# Patient Record
Sex: MALE | Race: WHITE | NOT HISPANIC OR LATINO | Employment: FULL TIME | URBAN - METROPOLITAN AREA
[De-identification: names, ages, dates, MRNs, and addresses within clinical notes are randomized per-mention and may not be internally consistent; named-entity substitution may affect disease eponyms.]

---

## 2019-07-16 ENCOUNTER — OFFICE VISIT - HEALTHEAST (OUTPATIENT)
Dept: FAMILY MEDICINE | Facility: CLINIC | Age: 34
End: 2019-07-16

## 2019-07-16 DIAGNOSIS — F41.1 GENERALIZED ANXIETY DISORDER WITH PANIC ATTACKS: ICD-10-CM

## 2019-07-16 DIAGNOSIS — R63.4 LOSS OF WEIGHT: ICD-10-CM

## 2019-07-16 DIAGNOSIS — F32.1 CURRENT MODERATE EPISODE OF MAJOR DEPRESSIVE DISORDER, UNSPECIFIED WHETHER RECURRENT (H): ICD-10-CM

## 2019-07-16 DIAGNOSIS — Z00.01 ENCOUNTER FOR ROUTINE ADULT HEALTH EXAMINATION WITH ABNORMAL FINDINGS: ICD-10-CM

## 2019-07-16 DIAGNOSIS — D50.9 MICROCYTIC ANEMIA: ICD-10-CM

## 2019-07-16 DIAGNOSIS — J30.2 SEASONAL ALLERGIC RHINITIS, UNSPECIFIED TRIGGER: ICD-10-CM

## 2019-07-16 DIAGNOSIS — G43.009 MIGRAINE WITHOUT AURA AND WITHOUT STATUS MIGRAINOSUS, NOT INTRACTABLE: ICD-10-CM

## 2019-07-16 DIAGNOSIS — F41.0 GENERALIZED ANXIETY DISORDER WITH PANIC ATTACKS: ICD-10-CM

## 2019-07-16 DIAGNOSIS — F11.90 METHADONE USE: ICD-10-CM

## 2019-07-16 DIAGNOSIS — Z13.1 ENCOUNTER FOR SCREENING FOR DIABETES MELLITUS: ICD-10-CM

## 2019-07-16 DIAGNOSIS — Z13.220 ENCOUNTER FOR SCREENING FOR LIPOID DISORDERS: ICD-10-CM

## 2019-07-16 DIAGNOSIS — Z23 IMMUNIZATION DUE: ICD-10-CM

## 2019-07-16 DIAGNOSIS — K29.50 CHRONIC GASTRITIS WITHOUT BLEEDING, UNSPECIFIED GASTRITIS TYPE: ICD-10-CM

## 2019-07-16 DIAGNOSIS — R61 NIGHT SWEATS: ICD-10-CM

## 2019-07-16 LAB
ALBUMIN SERPL-MCNC: 4.3 G/DL (ref 3.5–5)
ALP SERPL-CCNC: 92 U/L (ref 45–120)
ALT SERPL W P-5'-P-CCNC: 15 U/L (ref 0–45)
ANION GAP SERPL CALCULATED.3IONS-SCNC: 9 MMOL/L (ref 5–18)
AST SERPL W P-5'-P-CCNC: 21 U/L (ref 0–40)
ATRIAL RATE - MUSE: 60 BPM
BASOPHILS # BLD AUTO: 0 THOU/UL (ref 0–0.2)
BASOPHILS NFR BLD AUTO: 0 % (ref 0–2)
BILIRUB SERPL-MCNC: 0.2 MG/DL (ref 0–1)
BUN SERPL-MCNC: 17 MG/DL (ref 8–22)
CALCIUM SERPL-MCNC: 10.2 MG/DL (ref 8.5–10.5)
CHLORIDE BLD-SCNC: 104 MMOL/L (ref 98–107)
CHOLEST SERPL-MCNC: 162 MG/DL
CO2 SERPL-SCNC: 27 MMOL/L (ref 22–31)
CREAT SERPL-MCNC: 0.91 MG/DL (ref 0.7–1.3)
DIASTOLIC BLOOD PRESSURE - MUSE: NORMAL MMHG
EOSINOPHIL # BLD AUTO: 0.1 THOU/UL (ref 0–0.4)
EOSINOPHIL NFR BLD AUTO: 3 % (ref 0–6)
ERYTHROCYTE [DISTWIDTH] IN BLOOD BY AUTOMATED COUNT: 14.6 % (ref 11–14.5)
FASTING STATUS PATIENT QL REPORTED: YES
GFR SERPL CREATININE-BSD FRML MDRD: >60 ML/MIN/1.73M2
GLUCOSE BLD-MCNC: 86 MG/DL (ref 70–125)
HCT VFR BLD AUTO: 34.7 % (ref 40–54)
HDLC SERPL-MCNC: 40 MG/DL
HGB BLD-MCNC: 11.5 G/DL (ref 14–18)
INTERPRETATION ECG - MUSE: NORMAL
LDLC SERPL CALC-MCNC: 93 MG/DL
LIPASE SERPL-CCNC: 23 U/L (ref 0–52)
LYMPHOCYTES # BLD AUTO: 1.2 THOU/UL (ref 0.8–4.4)
LYMPHOCYTES NFR BLD AUTO: 29 % (ref 20–40)
MCH RBC QN AUTO: 24.9 PG (ref 27–34)
MCHC RBC AUTO-ENTMCNC: 33.2 G/DL (ref 32–36)
MCV RBC AUTO: 75 FL (ref 80–100)
MONOCYTES # BLD AUTO: 0.2 THOU/UL (ref 0–0.9)
MONOCYTES NFR BLD AUTO: 5 % (ref 2–10)
NEUTROPHILS # BLD AUTO: 2.6 THOU/UL (ref 2–7.7)
NEUTROPHILS NFR BLD AUTO: 62 % (ref 50–70)
P AXIS - MUSE: 74 DEGREES
PLATELET # BLD AUTO: 155 THOU/UL (ref 140–440)
PMV BLD AUTO: 9.9 FL (ref 7–10)
POTASSIUM BLD-SCNC: 4.8 MMOL/L (ref 3.5–5)
PR INTERVAL - MUSE: 134 MS
PROT SERPL-MCNC: 7.2 G/DL (ref 6–8)
QRS DURATION - MUSE: 100 MS
QT - MUSE: 438 MS
QTC - MUSE: 438 MS
R AXIS - MUSE: 67 DEGREES
RBC # BLD AUTO: 4.63 MILL/UL (ref 4.4–6.2)
SODIUM SERPL-SCNC: 140 MMOL/L (ref 136–145)
SYSTOLIC BLOOD PRESSURE - MUSE: NORMAL MMHG
T AXIS - MUSE: 69 DEGREES
TRIGL SERPL-MCNC: 147 MG/DL
TSH SERPL DL<=0.005 MIU/L-ACNC: 0.75 UIU/ML (ref 0.3–5)
VENTRICULAR RATE- MUSE: 60 BPM
WBC: 4.2 THOU/UL (ref 4–11)

## 2019-07-16 ASSESSMENT — MIFFLIN-ST. JEOR: SCORE: 1624.3

## 2019-07-17 ENCOUNTER — COMMUNICATION - HEALTHEAST (OUTPATIENT)
Dept: LAB | Facility: CLINIC | Age: 34
End: 2019-07-17

## 2019-07-17 DIAGNOSIS — F11.90 METHADONE USE: ICD-10-CM

## 2019-07-17 LAB — TESTOST SERPL-MCNC: 730 NG/DL (ref 240–871)

## 2019-07-18 ENCOUNTER — COMMUNICATION - HEALTHEAST (OUTPATIENT)
Dept: FAMILY MEDICINE | Facility: CLINIC | Age: 34
End: 2019-07-18

## 2019-07-19 ENCOUNTER — COMMUNICATION - HEALTHEAST (OUTPATIENT)
Dept: FAMILY MEDICINE | Facility: CLINIC | Age: 34
End: 2019-07-19

## 2019-07-19 LAB
GAMMA INTERFERON BACKGROUND BLD IA-ACNC: 0.03 IU/ML
M TB IFN-G BLD-IMP: NEGATIVE
MITOGEN IGNF BCKGRD COR BLD-ACNC: 0 IU/ML
MITOGEN IGNF BCKGRD COR BLD-ACNC: 0.02 IU/ML
QTF INTERPRETATION: NORMAL
QTF MITOGEN - NIL: >10 IU/ML

## 2019-07-23 ENCOUNTER — COMMUNICATION - HEALTHEAST (OUTPATIENT)
Dept: FAMILY MEDICINE | Facility: CLINIC | Age: 34
End: 2019-07-23

## 2019-07-23 ENCOUNTER — AMBULATORY - HEALTHEAST (OUTPATIENT)
Dept: LAB | Facility: CLINIC | Age: 34
End: 2019-07-23

## 2019-07-23 DIAGNOSIS — K29.50 CHRONIC GASTRITIS WITHOUT BLEEDING, UNSPECIFIED GASTRITIS TYPE: ICD-10-CM

## 2019-07-25 LAB
H PYLORI AG STL QL IA: NORMAL
REPORT STATUS: NORMAL
SPECIMEN DESCRIPTION: NORMAL

## 2019-07-26 ENCOUNTER — COMMUNICATION - HEALTHEAST (OUTPATIENT)
Dept: FAMILY MEDICINE | Facility: CLINIC | Age: 34
End: 2019-07-26

## 2019-08-20 ENCOUNTER — OFFICE VISIT - HEALTHEAST (OUTPATIENT)
Dept: FAMILY MEDICINE | Facility: CLINIC | Age: 34
End: 2019-08-20

## 2019-08-20 DIAGNOSIS — F41.1 GENERALIZED ANXIETY DISORDER WITH PANIC ATTACKS: ICD-10-CM

## 2019-08-20 DIAGNOSIS — F32.1 CURRENT MODERATE EPISODE OF MAJOR DEPRESSIVE DISORDER, UNSPECIFIED WHETHER RECURRENT (H): ICD-10-CM

## 2019-08-20 DIAGNOSIS — F41.0 GENERALIZED ANXIETY DISORDER WITH PANIC ATTACKS: ICD-10-CM

## 2019-08-20 ASSESSMENT — MIFFLIN-ST. JEOR: SCORE: 1642.16

## 2019-10-01 ENCOUNTER — RECORDS - HEALTHEAST (OUTPATIENT)
Dept: ADMINISTRATIVE | Facility: OTHER | Age: 34
End: 2019-10-01

## 2019-10-02 ENCOUNTER — COMMUNICATION - HEALTHEAST (OUTPATIENT)
Dept: FAMILY MEDICINE | Facility: CLINIC | Age: 34
End: 2019-10-02

## 2019-11-04 ENCOUNTER — OFFICE VISIT - HEALTHEAST (OUTPATIENT)
Dept: FAMILY MEDICINE | Facility: CLINIC | Age: 34
End: 2019-11-04

## 2019-11-04 DIAGNOSIS — R05.9 COUGH: ICD-10-CM

## 2019-11-04 DIAGNOSIS — J02.9 SORE THROAT: ICD-10-CM

## 2019-11-04 LAB — DEPRECATED S PYO AG THROAT QL EIA: NORMAL

## 2019-11-04 ASSESSMENT — MIFFLIN-ST. JEOR: SCORE: 1672.77

## 2019-11-05 LAB — GROUP A STREP BY PCR: NORMAL

## 2019-11-08 ENCOUNTER — COMMUNICATION - HEALTHEAST (OUTPATIENT)
Dept: FAMILY MEDICINE | Facility: CLINIC | Age: 34
End: 2019-11-08

## 2019-11-08 DIAGNOSIS — F32.1 CURRENT MODERATE EPISODE OF MAJOR DEPRESSIVE DISORDER, UNSPECIFIED WHETHER RECURRENT (H): ICD-10-CM

## 2019-11-08 DIAGNOSIS — F41.1 GENERALIZED ANXIETY DISORDER WITH PANIC ATTACKS: ICD-10-CM

## 2019-11-08 DIAGNOSIS — F41.0 GENERALIZED ANXIETY DISORDER WITH PANIC ATTACKS: ICD-10-CM

## 2020-01-31 ENCOUNTER — RECORDS - HEALTHEAST (OUTPATIENT)
Dept: ADMINISTRATIVE | Facility: OTHER | Age: 35
End: 2020-01-31

## 2020-03-25 ENCOUNTER — COMMUNICATION - HEALTHEAST (OUTPATIENT)
Dept: SCHEDULING | Facility: CLINIC | Age: 35
End: 2020-03-25

## 2020-03-26 ENCOUNTER — OFFICE VISIT - HEALTHEAST (OUTPATIENT)
Dept: FAMILY MEDICINE | Facility: CLINIC | Age: 35
End: 2020-03-26

## 2020-05-20 ENCOUNTER — HOSPITAL ENCOUNTER (EMERGENCY)
Facility: CLINIC | Age: 35
Discharge: HOME | End: 2020-05-20
Attending: FAMILY MEDICINE | Admitting: FAMILY MEDICINE
Payer: COMMERCIAL

## 2020-05-20 ENCOUNTER — RECORDS - HEALTHEAST (OUTPATIENT)
Dept: ADMINISTRATIVE | Facility: OTHER | Age: 35
End: 2020-05-20

## 2020-05-20 ENCOUNTER — COMMUNICATION - HEALTHEAST (OUTPATIENT)
Dept: SCHEDULING | Facility: CLINIC | Age: 35
End: 2020-05-20

## 2020-05-20 VITALS
DIASTOLIC BLOOD PRESSURE: 90 MMHG | SYSTOLIC BLOOD PRESSURE: 135 MMHG | WEIGHT: 160 LBS | HEART RATE: 77 BPM | TEMPERATURE: 98.9 F | OXYGEN SATURATION: 98 %

## 2020-05-20 DIAGNOSIS — R10.13 EPIGASTRIC PAIN: ICD-10-CM

## 2020-05-20 DIAGNOSIS — K21.00 GASTROESOPHAGEAL REFLUX DISEASE WITH ESOPHAGITIS: ICD-10-CM

## 2020-05-20 LAB
ALBUMIN SERPL-MCNC: 3.9 G/DL (ref 3.4–5)
ALP SERPL-CCNC: 117 U/L (ref 40–150)
ALT SERPL W P-5'-P-CCNC: 30 U/L (ref 0–70)
ANION GAP SERPL CALCULATED.3IONS-SCNC: 8 MMOL/L (ref 3–14)
AST SERPL W P-5'-P-CCNC: 19 U/L (ref 0–45)
BASOPHILS # BLD AUTO: 0.1 10E9/L (ref 0–0.2)
BASOPHILS NFR BLD AUTO: 0.6 %
BILIRUB SERPL-MCNC: 0.2 MG/DL (ref 0.2–1.3)
BUN SERPL-MCNC: 12 MG/DL (ref 7–30)
CALCIUM SERPL-MCNC: 9.3 MG/DL (ref 8.5–10.1)
CHLORIDE SERPL-SCNC: 106 MMOL/L (ref 94–109)
CO2 SERPL-SCNC: 27 MMOL/L (ref 20–32)
CREAT SERPL-MCNC: 0.79 MG/DL (ref 0.66–1.25)
DIFFERENTIAL METHOD BLD: NORMAL
EOSINOPHIL # BLD AUTO: 0.1 10E9/L (ref 0–0.7)
EOSINOPHIL NFR BLD AUTO: 1.1 %
ERYTHROCYTE [DISTWIDTH] IN BLOOD BY AUTOMATED COUNT: 14.1 % (ref 10–15)
GFR SERPL CREATININE-BSD FRML MDRD: >90 ML/MIN/{1.73_M2}
GLUCOSE SERPL-MCNC: 107 MG/DL (ref 70–99)
HCT VFR BLD AUTO: 40.1 % (ref 40–53)
HGB BLD-MCNC: 13.6 G/DL (ref 13.3–17.7)
IMM GRANULOCYTES # BLD: 0 10E9/L (ref 0–0.4)
IMM GRANULOCYTES NFR BLD: 0.2 %
LIPASE SERPL-CCNC: 80 U/L (ref 73–393)
LYMPHOCYTES # BLD AUTO: 2.1 10E9/L (ref 0.8–5.3)
LYMPHOCYTES NFR BLD AUTO: 25.3 %
MCH RBC QN AUTO: 28.5 PG (ref 26.5–33)
MCHC RBC AUTO-ENTMCNC: 33.9 G/DL (ref 31.5–36.5)
MCV RBC AUTO: 84 FL (ref 78–100)
MONOCYTES # BLD AUTO: 0.8 10E9/L (ref 0–1.3)
MONOCYTES NFR BLD AUTO: 9 %
NEUTROPHILS # BLD AUTO: 5.3 10E9/L (ref 1.6–8.3)
NEUTROPHILS NFR BLD AUTO: 63.8 %
NRBC # BLD AUTO: 0 10*3/UL
NRBC BLD AUTO-RTO: 0 /100
PLATELET # BLD AUTO: 227 10E9/L (ref 150–450)
POTASSIUM SERPL-SCNC: 3.6 MMOL/L (ref 3.4–5.3)
PROT SERPL-MCNC: 7.5 G/DL (ref 6.8–8.8)
RBC # BLD AUTO: 4.77 10E12/L (ref 4.4–5.9)
SODIUM SERPL-SCNC: 141 MMOL/L (ref 133–144)
WBC # BLD AUTO: 8.4 10E9/L (ref 4–11)

## 2020-05-20 PROCEDURE — 96361 HYDRATE IV INFUSION ADD-ON: CPT

## 2020-05-20 PROCEDURE — 99284 EMERGENCY DEPT VISIT MOD MDM: CPT | Mod: 25

## 2020-05-20 PROCEDURE — 99284 EMERGENCY DEPT VISIT MOD MDM: CPT | Mod: Z6 | Performed by: FAMILY MEDICINE

## 2020-05-20 PROCEDURE — 25000128 H RX IP 250 OP 636: Performed by: FAMILY MEDICINE

## 2020-05-20 PROCEDURE — 25800030 ZZH RX IP 258 OP 636: Performed by: FAMILY MEDICINE

## 2020-05-20 PROCEDURE — 85025 COMPLETE CBC W/AUTO DIFF WBC: CPT | Performed by: FAMILY MEDICINE

## 2020-05-20 PROCEDURE — 96375 TX/PRO/DX INJ NEW DRUG ADDON: CPT

## 2020-05-20 PROCEDURE — 80053 COMPREHEN METABOLIC PANEL: CPT | Performed by: FAMILY MEDICINE

## 2020-05-20 PROCEDURE — 96374 THER/PROPH/DIAG INJ IV PUSH: CPT

## 2020-05-20 PROCEDURE — 83690 ASSAY OF LIPASE: CPT | Performed by: FAMILY MEDICINE

## 2020-05-20 RX ORDER — SUMATRIPTAN 50 MG/1
50 TABLET, FILM COATED ORAL
COMMUNITY
Start: 2019-07-16 | End: 2020-07-01

## 2020-05-20 RX ORDER — METOCLOPRAMIDE HYDROCHLORIDE 5 MG/ML
5 INJECTION INTRAMUSCULAR; INTRAVENOUS ONCE
Status: COMPLETED | OUTPATIENT
Start: 2020-05-20 | End: 2020-05-20

## 2020-05-20 RX ORDER — LORATADINE 10 MG/1
10 TABLET ORAL
COMMUNITY
End: 2020-07-01

## 2020-05-20 RX ORDER — ESCITALOPRAM OXALATE 20 MG/1
TABLET ORAL
COMMUNITY
Start: 2019-11-08 | End: 2020-07-01

## 2020-05-20 RX ORDER — SUCRALFATE ORAL 1 G/10ML
1-2 SUSPENSION ORAL 4 TIMES DAILY PRN
Qty: 420 ML | Refills: 0 | Status: SHIPPED | OUTPATIENT
Start: 2020-05-20 | End: 2020-07-01

## 2020-05-20 RX ORDER — METHADONE HYDROCHLORIDE 10 MG/ML
170 CONCENTRATE ORAL DAILY
COMMUNITY

## 2020-05-20 RX ORDER — DIPHENHYDRAMINE HYDROCHLORIDE 50 MG/ML
12.5 INJECTION INTRAMUSCULAR; INTRAVENOUS ONCE
Status: COMPLETED | OUTPATIENT
Start: 2020-05-20 | End: 2020-05-20

## 2020-05-20 RX ORDER — ONDANSETRON 2 MG/ML
4 INJECTION INTRAMUSCULAR; INTRAVENOUS EVERY 30 MIN PRN
Status: DISCONTINUED | OUTPATIENT
Start: 2020-05-20 | End: 2020-05-21 | Stop reason: HOSPADM

## 2020-05-20 RX ADMIN — METOCLOPRAMIDE HYDROCHLORIDE 5 MG: 5 INJECTION INTRAMUSCULAR; INTRAVENOUS at 21:56

## 2020-05-20 RX ADMIN — SODIUM CHLORIDE 1000 ML: 9 INJECTION, SOLUTION INTRAVENOUS at 20:59

## 2020-05-20 RX ADMIN — DIPHENHYDRAMINE HYDROCHLORIDE 12.5 MG: 50 INJECTION, SOLUTION INTRAMUSCULAR; INTRAVENOUS at 21:55

## 2020-05-20 RX ADMIN — ONDANSETRON 8 MG: 2 INJECTION INTRAMUSCULAR; INTRAVENOUS at 20:57

## 2020-05-20 NOTE — ED AVS SNAPSHOT
[facility] Emergency Department  [address] [address]  Phone:  [phone]  Fax:  [phone]                                    Matt Ramirez   MRN: [id number]    Department:  [facility] Emergency Department   Date of Visit:  5/20/2020           After Visit Summary Signature Page    I have received my discharge instructions, and my questions have been answered. I have discussed any challenges I see with this plan with the nurse or doctor.    ..........................................................................................................................................  Patient/Patient Representative Signature      ..........................................................................................................................................  Patient Representative Print Name and Relationship to Patient    ..................................................               ................................................  Date                                   Time    ..........................................................................................................................................  Reviewed by Signature/Title    ...................................................              ..............................................  Date                                               Time          22EPIC Rev 08/18

## 2020-05-21 NOTE — ED NOTES
Pt presents to ED with complaints of abd pain from known ulcers.  Pt was advised to be seen in ED d/t pain, lack of eating, and continued nausea/vomiting.  Pt arrives via EMS.  Pt A&Ox4.  States that he was to have surgery until COVID pandemic caused surgeries to be put on hold.

## 2020-05-21 NOTE — DISCHARGE INSTRUCTIONS
Return to the Emergency Room if the following occurs:     Severely worsened pain, dehydration, fever >101, or for any concern at anytime.    Or, follow-up with the following provider as we discussed:     Return to your primary doctor or GI specialist or surgeon as able/scheduled.    Medications discussed:    Consider Carafate added to your current regimen.    If you received pain-relieving or sedating medication during your time in the ER, avoid alcohol, driving automobiles, or working with machinery.  Also, a responsible adult must stay with you.        Call the Nurse Advice Line at [phone] or [phone] for any concern at anytime.

## 2020-05-21 NOTE — ED NOTES
"Pt appears distressed, states \"I just wish I could have a pain shot and a sleeping pill, and I could sleep through this.\" pt updated on meds ordered by MD, which he is agreeable to trial.   "

## 2020-05-21 NOTE — ED PROVIDER NOTES
"  HPI   The patient is a 34-year-old male presenting with epigastric pain \"related to my ulcers.\"  He has a known history of peptic ulcer disease.  He is currently taking high doses of Prilosec and Pepcid.  His last upper endoscopy was about 4 months ago.  He reports his primary care clinic and GI physician in [place], [place].  He denies having been in the hospital environment previously.  He does report having chronic low back pain and using ibuprofen on a regular basis.  He denies having H. pylori.  He smokes.  He does not chew tobacco.  He does not drink alcohol.    His pain is felt in the epigastrium and left upper quadrant.  The pain is constant.  He has nausea and retching especially over the past 2 days.  No hematemesis reported.  He denies diarrhea or constipation.  No hematochezia or melena.  Reports his pain is severe.  No radiating symptoms described.  No chest pain or shortness of breath.  No flank pain.  No urinary symptoms.  No recent trauma or injury.  His pain is very similar to previous just more severe.        Allergies:  No Known Allergies  Problem List:    There are no active problems to display for this patient.     Past Medical History:    History reviewed. No pertinent past medical history.  Past Surgical History:    History reviewed. No pertinent surgical history.  Family History:    No family history on file.  Social History:  Marital Status:  Single [1]  Social History     Tobacco Use     Smoking status: None   Substance Use Topics     Alcohol use: None     Drug use: None      Medications:    escitalopram (LEXAPRO) 20 MG tablet  sucralfate (CARAFATE) 1 GM/10ML suspension  SUMAtriptan (IMITREX) 50 MG tablet  loratadine (CLARITIN) 10 MG tablet  methadone (DOLOPHINE-INTENSOL) 10 MG/ML (HIGH CONC) solution      Review of Systems   All other systems reviewed and are negative.      PE   BP: (!) 133/91  Pulse: 81  Heart Rate: 91  Temp: 98.9  F (37.2  C)  Weight: 72.6 kg (160 lb)  SpO2: 99 " %  Physical Exam  Vitals signs and nursing note reviewed.   Constitutional:       General: He is in acute distress.      Appearance: He is not diaphoretic.      Comments: Curled into a fetal position on the R side.  Cooperative and answering questions well.  Grimacing.   HENT:      Head: Atraumatic.   Eyes:      General: No scleral icterus.     Pupils: Pupils are equal, round, and reactive to light.   Neck:      Musculoskeletal: Normal range of motion.   Cardiovascular:      Heart sounds: Normal heart sounds.   Pulmonary:      Effort: No respiratory distress.      Breath sounds: Normal breath sounds.   Abdominal:      General: Bowel sounds are normal.      Palpations: Abdomen is soft.      Comments: Tender in the epigastrium and left upper quadrant.  Soft throughout.  No distention.   Musculoskeletal: Normal range of motion.         General: No tenderness.   Skin:     General: Skin is warm.      Findings: No rash.   Neurological:      Mental Status: He is alert and oriented to person, place, and time.   Psychiatric:         Behavior: Behavior normal.         ED COURSE and [facility]   2152.  Patient presenting with left upper quadrant pain.  His pain is similar to what he has experienced previously, just more severe.  Low concern for perforated ulcer as his blood work is normal, he is without surgical abdomen, and his vital signs are within normal limits.  GI cocktail, Reglan, Benadryl ordered.  Fluid bolus.    2322.  Lab values unremarkable.  Low concern for severe underlying pathology requiring hospitalization or emergent consultation.  Medications given did not seem to help his pain much.  He is requesting narcotic medication.  I am hesitant to provide narcotic medication in the situation and I discussed this with him directly.  I would provide Carafate as needed for pain.  He agreed.  Follow-up discussed.  Return for worsening.    LABS  Labs Ordered and Resulted from Time of ED Arrival Up to the Time of Departure from  the ED   COMPREHENSIVE METABOLIC PANEL - Abnormal; Notable for the following components:       Result Value    Glucose 107 (*)     All other components within normal limits   CBC WITH PLATELETS DIFFERENTIAL   LIPASE   ALCOHOL ETHYL       IMAGING  Images reviewed by me.  Radiology report also reviewed.  No orders to display       Procedures    Medications   ondansetron (ZOFRAN) injection 4 mg (8 mg Intravenous Given 5/20/20 2057)   lidocaine (XYLOCAINE) 2 % 15 mL, alum & mag hydroxide-simethicone (MAALOX  ES) 15 mL GI Cocktail (has no administration in time range)   0.9% sodium chloride BOLUS (0 mLs Intravenous Stopped 5/20/20 2200)   metoclopramide (REGLAN) injection 5 mg (5 mg Intravenous Given 5/20/20 2156)   diphenhydrAMINE (BENADRYL) injection 12.5 mg (12.5 mg Intravenous Given 5/20/20 2155)         IMPRESSION       ICD-10-CM    1. Epigastric pain  R10.13    2. Gastroesophageal reflux disease with esophagitis  K21.0             Medication List      Started    sucralfate 1 GM/10ML suspension  Commonly known as:  Carafate  1-2 g, Oral, 4 TIMES DAILY PRN                          Mario Bennett MD  05/20/20 6840

## 2020-07-01 ENCOUNTER — APPOINTMENT (OUTPATIENT)
Dept: GENERAL RADIOLOGY | Facility: CLINIC | Age: 35
End: 2020-07-01
Attending: PHYSICIAN ASSISTANT
Payer: COMMERCIAL

## 2020-07-01 ENCOUNTER — RECORDS - HEALTHEAST (OUTPATIENT)
Dept: ADMINISTRATIVE | Facility: OTHER | Age: 35
End: 2020-07-01

## 2020-07-01 ENCOUNTER — COMMUNICATION - HEALTHEAST (OUTPATIENT)
Dept: FAMILY MEDICINE | Facility: CLINIC | Age: 35
End: 2020-07-01

## 2020-07-01 ENCOUNTER — APPOINTMENT (OUTPATIENT)
Dept: CT IMAGING | Facility: CLINIC | Age: 35
End: 2020-07-01
Attending: PHYSICIAN ASSISTANT
Payer: COMMERCIAL

## 2020-07-01 ENCOUNTER — HOSPITAL ENCOUNTER (EMERGENCY)
Facility: CLINIC | Age: 35
Discharge: HOME | End: 2020-07-01
Attending: PHYSICIAN ASSISTANT | Admitting: PHYSICIAN ASSISTANT
Payer: COMMERCIAL

## 2020-07-01 VITALS
OXYGEN SATURATION: 98 % | HEART RATE: 72 BPM | RESPIRATION RATE: 16 BRPM | SYSTOLIC BLOOD PRESSURE: 103 MMHG | DIASTOLIC BLOOD PRESSURE: 70 MMHG | HEIGHT: 72 IN | WEIGHT: 160 LBS | TEMPERATURE: 98.7 F | BODY MASS INDEX: 21.67 KG/M2

## 2020-07-01 DIAGNOSIS — R10.13 EPIGASTRIC PAIN: ICD-10-CM

## 2020-07-01 DIAGNOSIS — R19.7 NAUSEA, VOMITING, AND DIARRHEA: ICD-10-CM

## 2020-07-01 DIAGNOSIS — R50.9 FEVER: ICD-10-CM

## 2020-07-01 DIAGNOSIS — R11.2 NAUSEA, VOMITING, AND DIARRHEA: ICD-10-CM

## 2020-07-01 LAB
ALBUMIN SERPL-MCNC: 4.5 G/DL (ref 3.4–5)
ALP SERPL-CCNC: 114 U/L (ref 40–150)
ALT SERPL W P-5'-P-CCNC: 19 U/L (ref 0–70)
ANION GAP SERPL CALCULATED.3IONS-SCNC: 8 MMOL/L (ref 3–14)
AST SERPL W P-5'-P-CCNC: 13 U/L (ref 0–45)
BASOPHILS # BLD AUTO: 0 10E9/L (ref 0–0.2)
BASOPHILS NFR BLD AUTO: 0.4 %
BILIRUB SERPL-MCNC: 0.3 MG/DL (ref 0.2–1.3)
BUN SERPL-MCNC: 13 MG/DL (ref 7–30)
CALCIUM SERPL-MCNC: 9.9 MG/DL (ref 8.5–10.1)
CHLORIDE SERPL-SCNC: 102 MMOL/L (ref 94–109)
CO2 SERPL-SCNC: 28 MMOL/L (ref 20–32)
CREAT SERPL-MCNC: 0.8 MG/DL (ref 0.66–1.25)
DIFFERENTIAL METHOD BLD: NORMAL
EOSINOPHIL # BLD AUTO: 0 10E9/L (ref 0–0.7)
EOSINOPHIL NFR BLD AUTO: 0.4 %
ERYTHROCYTE [DISTWIDTH] IN BLOOD BY AUTOMATED COUNT: 13.1 % (ref 10–15)
GFR SERPL CREATININE-BSD FRML MDRD: >90 ML/MIN/{1.73_M2}
GLUCOSE SERPL-MCNC: 106 MG/DL (ref 70–99)
HCT VFR BLD AUTO: 43.8 % (ref 40–53)
HGB BLD-MCNC: 15 G/DL (ref 13.3–17.7)
IMM GRANULOCYTES # BLD: 0 10E9/L (ref 0–0.4)
IMM GRANULOCYTES NFR BLD: 0.2 %
LIPASE SERPL-CCNC: 85 U/L (ref 73–393)
LYMPHOCYTES # BLD AUTO: 1.1 10E9/L (ref 0.8–5.3)
LYMPHOCYTES NFR BLD AUTO: 13.9 %
MCH RBC QN AUTO: 29.1 PG (ref 26.5–33)
MCHC RBC AUTO-ENTMCNC: 34.2 G/DL (ref 31.5–36.5)
MCV RBC AUTO: 85 FL (ref 78–100)
MONOCYTES # BLD AUTO: 0.4 10E9/L (ref 0–1.3)
MONOCYTES NFR BLD AUTO: 4.4 %
NEUTROPHILS # BLD AUTO: 6.6 10E9/L (ref 1.6–8.3)
NEUTROPHILS NFR BLD AUTO: 80.7 %
NRBC # BLD AUTO: 0 10*3/UL
NRBC BLD AUTO-RTO: 0 /100
PLATELET # BLD AUTO: 210 10E9/L (ref 150–450)
POTASSIUM SERPL-SCNC: 3.9 MMOL/L (ref 3.4–5.3)
PROT SERPL-MCNC: 8.2 G/DL (ref 6.8–8.8)
RBC # BLD AUTO: 5.16 10E12/L (ref 4.4–5.9)
SODIUM SERPL-SCNC: 138 MMOL/L (ref 133–144)
TROPONIN I SERPL-MCNC: <0.015 UG/L (ref 0–0.04)
WBC # BLD AUTO: 8.2 10E9/L (ref 4–11)

## 2020-07-01 PROCEDURE — U0003 INFECTIOUS AGENT DETECTION BY NUCLEIC ACID (DNA OR RNA); SEVERE ACUTE RESPIRATORY SYNDROME CORONAVIRUS 2 (SARS-COV-2) (CORONAVIRUS DISEASE [COVID-19]), AMPLIFIED PROBE TECHNIQUE, MAKING USE OF HIGH THROUGHPUT TECHNOLOGIES AS DESCRIBED BY CMS-2020-01-R: HCPCS | Performed by: PHYSICIAN ASSISTANT

## 2020-07-01 PROCEDURE — 25800030 ZZH RX IP 258 OP 636: Performed by: EMERGENCY MEDICINE

## 2020-07-01 PROCEDURE — 99285 EMERGENCY DEPT VISIT HI MDM: CPT | Mod: 25 | Performed by: PHYSICIAN ASSISTANT

## 2020-07-01 PROCEDURE — C9113 INJ PANTOPRAZOLE SODIUM, VIA: HCPCS | Performed by: PHYSICIAN ASSISTANT

## 2020-07-01 PROCEDURE — 25000128 H RX IP 250 OP 636: Performed by: PHYSICIAN ASSISTANT

## 2020-07-01 PROCEDURE — 80053 COMPREHEN METABOLIC PANEL: CPT | Performed by: EMERGENCY MEDICINE

## 2020-07-01 PROCEDURE — 96375 TX/PRO/DX INJ NEW DRUG ADDON: CPT | Performed by: PHYSICIAN ASSISTANT

## 2020-07-01 PROCEDURE — 96376 TX/PRO/DX INJ SAME DRUG ADON: CPT | Performed by: PHYSICIAN ASSISTANT

## 2020-07-01 PROCEDURE — 96361 HYDRATE IV INFUSION ADD-ON: CPT | Performed by: PHYSICIAN ASSISTANT

## 2020-07-01 PROCEDURE — 85025 COMPLETE CBC W/AUTO DIFF WBC: CPT | Performed by: EMERGENCY MEDICINE

## 2020-07-01 PROCEDURE — 83690 ASSAY OF LIPASE: CPT | Performed by: EMERGENCY MEDICINE

## 2020-07-01 PROCEDURE — 96374 THER/PROPH/DIAG INJ IV PUSH: CPT | Mod: 59 | Performed by: PHYSICIAN ASSISTANT

## 2020-07-01 PROCEDURE — 99285 EMERGENCY DEPT VISIT HI MDM: CPT | Mod: Z6 | Performed by: PHYSICIAN ASSISTANT

## 2020-07-01 PROCEDURE — 25800030 ZZH RX IP 258 OP 636: Performed by: PHYSICIAN ASSISTANT

## 2020-07-01 PROCEDURE — 71045 X-RAY EXAM CHEST 1 VIEW: CPT

## 2020-07-01 PROCEDURE — 74177 CT ABD & PELVIS W/CONTRAST: CPT

## 2020-07-01 PROCEDURE — 25000125 ZZHC RX 250: Performed by: PHYSICIAN ASSISTANT

## 2020-07-01 PROCEDURE — 84484 ASSAY OF TROPONIN QUANT: CPT | Performed by: PHYSICIAN ASSISTANT

## 2020-07-01 RX ORDER — ACETAMINOPHEN 500 MG
1000 TABLET ORAL ONCE
Status: DISCONTINUED | OUTPATIENT
Start: 2020-07-01 | End: 2020-07-01 | Stop reason: HOSPADM

## 2020-07-01 RX ORDER — KETOROLAC TROMETHAMINE 15 MG/ML
15 INJECTION, SOLUTION INTRAMUSCULAR; INTRAVENOUS ONCE
Status: DISCONTINUED | OUTPATIENT
Start: 2020-07-01 | End: 2020-07-01

## 2020-07-01 RX ORDER — ONDANSETRON 2 MG/ML
4 INJECTION INTRAMUSCULAR; INTRAVENOUS EVERY 30 MIN PRN
Status: DISCONTINUED | OUTPATIENT
Start: 2020-07-01 | End: 2020-07-01 | Stop reason: HOSPADM

## 2020-07-01 RX ORDER — IOPAMIDOL 755 MG/ML
78 INJECTION, SOLUTION INTRAVASCULAR ONCE
Status: COMPLETED | OUTPATIENT
Start: 2020-07-01 | End: 2020-07-01

## 2020-07-01 RX ADMIN — IOPAMIDOL 78 ML: 755 INJECTION, SOLUTION INTRAVENOUS at 16:17

## 2020-07-01 RX ADMIN — ONDANSETRON 4 MG: 2 INJECTION INTRAMUSCULAR; INTRAVENOUS at 15:47

## 2020-07-01 RX ADMIN — ONDANSETRON 4 MG: 2 INJECTION INTRAMUSCULAR; INTRAVENOUS at 16:41

## 2020-07-01 RX ADMIN — SODIUM CHLORIDE 59 ML: 9 INJECTION, SOLUTION INTRAVENOUS at 16:17

## 2020-07-01 RX ADMIN — SODIUM CHLORIDE, POTASSIUM CHLORIDE, SODIUM LACTATE AND CALCIUM CHLORIDE 1000 ML: 600; 310; 30; 20 INJECTION, SOLUTION INTRAVENOUS at 14:32

## 2020-07-01 RX ADMIN — PANTOPRAZOLE SODIUM 40 MG: 40 INJECTION, POWDER, LYOPHILIZED, FOR SOLUTION INTRAVENOUS at 15:51

## 2020-07-01 ASSESSMENT — ENCOUNTER SYMPTOMS
CARDIOVASCULAR NEGATIVE: 1
VOMITING: 1
ABDOMINAL PAIN: 1
FEVER: 1
NAUSEA: 1
RESPIRATORY NEGATIVE: 1
DIARRHEA: 1

## 2020-07-01 ASSESSMENT — MIFFLIN-ST. JEOR: SCORE: 1698.76

## 2020-07-01 NOTE — ED TRIAGE NOTES
biba from home  N/v 4 days duration  coffee ground emesis started this morning  Dry heaving today  Unable to keep fluids down  Diarrhea 3 times daily  Headache, started taking ibuprofen but unable to keep down  LUQ pain started 2 days ago: describes and burning and moves around  No rash, no cough, no dyspnea  Denies etoh use  Admits to current use of 1/2 pack cigarrettes a day, has smoked for 15 years  Takes methadone daily, unable to keep it down for 4 days, no other active meds  Fever 102 at home  Lives with wife and kids, nobody else sick at home  4mg zofran   5mg morphine per EMS  Minimal relief with meds

## 2020-07-01 NOTE — ED NOTES
"Pt walks out of room stating, \"I am going to leave, you guys aren't fucking doing anything for me, I am just going to go to another hospital.  I have been waiting in there for hours, and you want to give me fucking tylenol for my pain.\"  Provider made aware of patient complaint.  ERT had patient return to room to remove IV.    "

## 2020-07-01 NOTE — ED NOTES
"RN spoke with patient wife.  Informed her that pt is receiving medication by this authors team partner as we speak.  Pt wife asked if she was allowed to come back to be with patient, RN informed her that there is not d/t policy regarding PUI patients.  She verbalized understanding.  She then asked if there was any way we could \"put a rush\" on his CT results.  RN explained that they read them as soon as they are able.  She verbalized ok.  NO further questions or concerns.   "

## 2020-07-01 NOTE — ED PROVIDER NOTES
History     Chief Complaint   Patient presents with     Nausea, Vomiting, & Diarrhea     HPI  Matt Ramirez is a 34 year old male who presents via ambulance with complaints of nausea and vomiting for the past 4 days.  He states he has been unable to keep down any meds or fluids.  He noted coffee-ground emesis this morning and has continued to dry-heave throughout the day.  Patient has had approximately 3 episodes of associated diarrhea.  Patient also complains of associated diffuse epigastric and left upper quadrant abdominal pain.  He also reported intermittent fevers up to 102 F since yesterday.  Denies rash, neck pain/stiffness, sore throat, shortness of breath, or urinary symptoms.  Patient denies any ill contacts and specifically denies any contact with anyone testing positive for COVID-19 however he has continued to work and is around a lot of people.  Patient received Zofran and morphine in route.  He reports history of ulcer disease and has not been taking his omeprazole as usual.  Patient denies alcohol use.      Allergies:  No Known Allergies    Problem List:    There are no active problems to display for this patient.       Past Medical History:    No past medical history on file.    Past Surgical History:    No past surgical history on file.    Family History:    No family history on file.    Social History:  Marital Status:  Single [1]  Social History     Tobacco Use     Smoking status: Not on file   Substance Use Topics     Alcohol use: Not on file     Drug use: Not on file        Medications:    omeprazole (PRILOSEC) 20 MG DR capsule  methadone (DOLOPHINE-INTENSOL) 10 MG/ML (HIGH CONC) solution          Review of Systems   Constitutional: Positive for fever.   HENT: Negative.    Respiratory: Negative.    Cardiovascular: Negative.    Gastrointestinal: Positive for abdominal pain, diarrhea, nausea and vomiting.   Genitourinary: Negative.    Skin: Negative.    All other systems reviewed and are  negative.      Physical Exam   BP: 124/86  Pulse: 79  Temp: 98.7  F (37.1  C)  Resp: 16  Height: 182.9 cm (6')  Weight: 72.6 kg (160 lb)  SpO2: 98 %      Physical Exam  Constitutional:       General: He is in acute distress.      Appearance: He is well-developed. He is not ill-appearing, toxic-appearing or diaphoretic.      Comments: Laying on right side, rocking   HENT:      Head: Normocephalic and atraumatic.      Nose: Nose normal. No rhinorrhea.      Mouth/Throat:      Mouth: Mucous membranes are moist.   Eyes:      Conjunctiva/sclera: Conjunctivae normal.      Pupils: Pupils are equal, round, and reactive to light.   Neck:      Musculoskeletal: Normal range of motion and neck supple. No neck rigidity.   Cardiovascular:      Rate and Rhythm: Normal rate and regular rhythm.      Heart sounds: Normal heart sounds.   Pulmonary:      Effort: Pulmonary effort is normal. No respiratory distress.      Breath sounds: Normal breath sounds. No stridor. No wheezing, rhonchi or rales.   Abdominal:      General: There is no distension.      Palpations: Abdomen is soft.      Tenderness: There is abdominal tenderness in the epigastric area and left upper quadrant. There is no right CVA tenderness, left CVA tenderness, guarding or rebound.   Musculoskeletal: Normal range of motion.   Lymphadenopathy:      Cervical: No cervical adenopathy.   Skin:     General: Skin is warm and dry.   Neurological:      Mental Status: He is alert and oriented to person, place, and time.         ED Course        Procedures      Results for orders placed or performed during the hospital encounter of 07/01/20 (from the past 24 hour(s))   Comprehensive metabolic panel   Result Value Ref Range    Sodium 138 133 - 144 mmol/L    Potassium 3.9 3.4 - 5.3 mmol/L    Chloride 102 94 - 109 mmol/L    Carbon Dioxide 28 20 - 32 mmol/L    Anion Gap 8 3 - 14 mmol/L    Glucose 106 (H) 70 - 99 mg/dL    Urea Nitrogen 13 7 - 30 mg/dL    Creatinine 0.80 0.66 - 1.25  mg/dL    GFR Estimate >90 >60 mL/min/[1.73_m2]    GFR Estimate If Black >90 >60 mL/min/[1.73_m2]    Calcium 9.9 8.5 - 10.1 mg/dL    Bilirubin Total 0.3 0.2 - 1.3 mg/dL    Albumin 4.5 3.4 - 5.0 g/dL    Protein Total 8.2 6.8 - 8.8 g/dL    Alkaline Phosphatase 114 40 - 150 U/L    ALT 19 0 - 70 U/L    AST 13 0 - 45 U/L   Lipase   Result Value Ref Range    Lipase 85 73 - 393 U/L   CBC with platelets differential   Result Value Ref Range    WBC 8.2 4.0 - 11.0 10e9/L    RBC Count 5.16 4.4 - 5.9 10e12/L    Hemoglobin 15.0 13.3 - 17.7 g/dL    Hematocrit 43.8 40.0 - 53.0 %    MCV 85 78 - 100 fl    MCH 29.1 26.5 - 33.0 pg    MCHC 34.2 31.5 - 36.5 g/dL    RDW 13.1 10.0 - 15.0 %    Platelet Count 210 150 - 450 10e9/L    Diff Method Automated Method     % Neutrophils 80.7 %    % Lymphocytes 13.9 %    % Monocytes 4.4 %    % Eosinophils 0.4 %    % Basophils 0.4 %    % Immature Granulocytes 0.2 %    Nucleated RBCs 0 0 /100    Absolute Neutrophil 6.6 1.6 - 8.3 10e9/L    Absolute Lymphocytes 1.1 0.8 - 5.3 10e9/L    Absolute Monocytes 0.4 0.0 - 1.3 10e9/L    Absolute Eosinophils 0.0 0.0 - 0.7 10e9/L    Absolute Basophils 0.0 0.0 - 0.2 10e9/L    Abs Immature Granulocytes 0.0 0 - 0.4 10e9/L    Absolute Nucleated RBC 0.0    Troponin I   Result Value Ref Range    Troponin I ES <0.015 0.000 - 0.045 ug/L   XR Chest Port 1 View    Narrative    CHEST PORTABLE ONE VIEW   7/1/2020 4:25 PM     HISTORY: Fevers, chest pain.    COMPARISON: None available.      Impression    IMPRESSION: No acute airspace infiltrate.    IOANA ECKERT MD   CT Abdomen Pelvis w Contrast    Narrative    CT ABDOMEN AND PELVIS WITH CONTRAST 7/1/2020 4:28 PM    CLINICAL HISTORY: Upper abdominal pain, nausea and vomiting, fevers,  coffee ground emesis.    TECHNIQUE: CT scan of the abdomen and pelvis was performed following  injection of IV contrast. Multiplanar reformats were obtained. Dose  reduction techniques were used.    CONTRAST: 78    COMPARISON:  None.    FINDINGS:   LOWER CHEST: Normal.    HEPATOBILIARY: Mild intrahepatic biliary prominence is noted. No  evidence for obstructing stone or mass within the common duct. The  gallbladder is unremarkable. The portal vein is patent.    PANCREAS: Normal.    SPLEEN: Normal.    ADRENAL GLANDS: Normal.    KIDNEYS/BLADDER: Normal.    BOWEL: Some wall thickening versus redundant gastric folds are seen  through the body of the stomach. At the pylorus there is some wall  thickening as well. Gastritis or ulcer disease cannot be excluded, but  this could represent normal peristalsis. The small bowel is normal in  appearance and caliber. No evidence for obstruction. Normal appendix.  No colonic wall thickening or inflammatory change.    PELVIC ORGANS: Normal.    ADDITIONAL FINDINGS: None.    MUSCULOSKELETAL: Normal.      Impression    IMPRESSION:   1.  Wall thickening versus redundant gastric folds in the body of the  stomach. There is also wall thickening in the pylorus that could be  related to normal peristalsis, gastritis, or ulcer disease.  2.  Mild intrahepatic biliary prominence without evidence of  obstructing stone or mass.    NEHA HIGHTOWER MD       Medications   lactated ringers BOLUS 1,000 mL (0 mLs Intravenous Stopped 7/1/20 1547)   iopamidol (ISOVUE-370) solution 78 mL (78 mLs Intravenous Given 7/1/20 1617)   sodium chloride 0.9 % bag 500mL for CT scan flush use (59 mLs Intravenous Given 7/1/20 1617)   pantoprazole (PROTONIX) 40 mg IV push injection (40 mg Intravenous Given 7/1/20 1551)       Assessments & Plan (with Medical Decision Making)     Pt is a 34 year old male who presents via ambulance with complaints of nausea and vomiting for the past 4 days.  He noted coffee-ground emesis this morning and has continued to dry-heave throughout the day.  Patient has had approximately 3 episodes of associated diarrhea.  Patient also complains of associated diffuse epigastric and left upper quadrant abdominal  "pain.  He also reported intermittent fevers up to 102 F since yesterday.  Denies cough or shortness of breath.  Patient received Zofran and morphine in route.  He reports history of ulcer disease and has not been taking his omeprazole as usual.      Pt is afebrile on arrival.  Exam as above.  Vital signs are stable.  No leukocytosis on CBC.  Normal hemoglobin.  Comprehensive metabolic panel is unremarkable; normal kidney and liver function tests.  Lipase is not elevated.  Chest x-ray was negative for acute pathology.  COVID-19 testing is pending.  CT of the abdomen and pelvis shows wall thickening versus redundant gastric folds in the body of the stomach as well as wall thickening in the pylorus that could be related to gastritis versus ulcer disease or may even be normal peristalsis.  There is also mild intrahepatic biliary prominence without evidence of obstructing stone or mass.  Patient has normal liver function tests.  Discussed results with patient.  Recommended consultation with surgeon and in order to facilitate endoscopy follow-up.  Patient has received IV fluids, Zofran, and PPI here.  He has had no further episodes of emesis or coffee-ground emesis since arrival.  Patient is requesting something for his headache and therefore Tylenol was prescribed.  Patient subsequently walked out of his room and demanded to leave.  \"I am going to leave, you guys aren't fucking doing anything for me, I am just going to go to another hospital.  I have been waiting in there for hours and you want to give me fucking Tylenol for my pain.\"  I recommended patient stay for further management, but he subsequently walked out of the department AMA.    Patient was sent with omeprazole and was instructed to follow-up with surgery for endoscopy as soon as possible for continued care and management.      I have reviewed the nursing notes.      Discharge Medication List as of 7/1/2020  5:05 PM      START taking these medications    " Details   omeprazole (PRILOSEC) 20 MG DR capsule Take 2 capsules (40 mg) by mouth daily, Disp-60 capsule,R-0, E-Prescribe             Final diagnoses:   Epigastric pain   Nausea, vomiting, and diarrhea   Fever       7/1/2020   [facility] EMERGENCY DEPARTMENT      Disclaimer:  This note consists of symbols derived from keyboarding, dictation and/or voice recognition software.  As a result, there may be errors in the script that have gone undetected.  Please consider this when interpreting information found in this chart.     Nathalie Lazcano PA-C  07/01/20 6819

## 2020-07-01 NOTE — LETTER
July 2, 2020        Matt Ramirez  [address]  [place] [address]    This letter provides a written record that you were tested for COVID-19 on 7/1/20.       Your result was negative. This means that we didn t find the virus that causes COVID-19 in your sample. A test may show negative when you do actually have the virus. This can happen when the virus is in the early stages of infection, before you feel illness symptoms.    If you have symptoms   Stay home and away from others (self-isolate) until you meet ALL of the guidelines below:    You ve had no fever--and no medicine that reduces fever--for 3 full days (72 hours). And      Your other symptoms have gotten better. For example, your cough or breathing has improved. And     At least 10 days have passed since your symptoms started.    During this time:    Stay home. Don t go to work, school or anywhere else.     Stay in your own room, including for meals. Use your own bathroom if you can.    Stay away from others in your home. No hugging, kissing or shaking hands. No visitors.    Clean  high touch  surfaces often (doorknobs, counters, handles, etc.). Use a household cleaning spray or wipes. You can find a full list on the EPA website at www.epa.gov/pesticide-registration/list-n-disinfectants-use-against-sars-cov-2.    Cover your mouth and nose with a mask, tissue or washcloth to avoid spreading germs.    Wash your hands and face often with soap and water.    Going back to work  Check with your employer for any guidelines to follow for going back to work.    Employers: This document serves as formal notice that your employee tested negative for COVID-19, as of the testing date shown above.

## 2020-07-02 LAB
SARS-COV-2 RNA SPEC QL NAA+PROBE: NOT DETECTED
SPECIMEN SOURCE: NORMAL

## 2021-05-30 NOTE — TELEPHONE ENCOUNTER
Spoke to pt, relayed Dr. Reddy's message below.  Pt stated he already the phone number for [facility] and will contact them to see if he qualifies for their program.

## 2021-05-30 NOTE — TELEPHONE ENCOUNTER
Called pt, left a msg to call back. When call is returned, please relay MD notes and document. Thank you.

## 2021-05-30 NOTE — TELEPHONE ENCOUNTER
----- Message from Yuliana Reddy DO sent at 7/18/2019  5:22 PM CDT -----  Please call patient with update.    Leia Gutierrez,  After talking with my specialty , she states that sometimes we send her patients to [facility] for methadone management.  They have 4 locations: [place], [place], [place], [place].      They do take most insurance plans, he would need to call there, they have an intake process/questions they do before entering /qualifying him for the program, their phone# is [phone].     Yuliana Reddy DO    ----- Message -----  From: Yuliana Reddy DO  Sent: 7/16/2019   2:24 PM  To: Yuliana Reddy DO    Waiting to hear back from [facility] on methadone treatment options for Matt. He currently goes to the [facility].

## 2021-05-30 NOTE — TELEPHONE ENCOUNTER
----- Message from Yuliana Reddy DO sent at 7/19/2019  2:34 PM CDT -----  Please call Matt.  TB screen was negative.  I checked this due to night sweats and weight loss.   Yuliana Reddy DO

## 2021-05-30 NOTE — PROGRESS NOTES
Please call Matt. His H pylori test has returned and is negative.   I look forward to seeing Matt back in a few weeks.  Yuliana Reddy, DO

## 2021-05-30 NOTE — TELEPHONE ENCOUNTER
Left message to call back for: lab results  Information to relay to patient:  Dr. Reddy's message below.  Please document call was returned.

## 2021-05-30 NOTE — TELEPHONE ENCOUNTER
----- Message from Yuliana Reddy DO sent at 7/17/2019  5:56 PM CDT -----  Please call Matt with his lab results.  Thank you.    Leia Gutierrez,    It was very nice to meet you in clinic the other day.  Most of your lab results have returned now.  Your testosterone level was normal.  Do not need to do the fasting testosterone level anymore as your testosterone was upper limit of normal and it would not provide any further information. Your cholesterol levels look excellent.  Your kidney function, electrolytes, and liver tests were all normal.  Thyroid test was also in the normal range.  You currently are mildly anemic, but this is improved compared to last blood test 5 months ago. I think it is important to follow-up with a EGD to evaluate for possible source of bleeding.  I am waiting on your H. pylori test and your TB test.    I will let you know when we have the rest of the results back.  Let me know if you have any other questions or concerns in the meantime.  I look forward to seeing you back in a few weeks.    Yuliana Reddy DO

## 2021-05-30 NOTE — PROGRESS NOTES
"[facility] Note    Name: Matt Ramirez  : 1985   MRN: [id number]    Matt Ramirez is a 33 y.o. male presenting to discuss the following:     CC:   Chief Complaint   Patient presents with     Annual Exam     fasting for labs.     Heartburn     Anxiety       HPI:  Methadone from [facility]. Gets sick every time tries to wean. Hasn't had monitoring for methadone. Never used narcotics, was prescribed opioids after back surgery, was never able to wean down. Says he had a mild stroke without any permanent damage and had to be defibrillated, was told he had a heart attack. Hasn't had heart issues since. Thinks was all withdrawal symptoms. Was taking blood pressure medication to alleviate withdrawal symptoms.     Hx of migraines. Severe when happen, has about 1-2 every other week. Uses Excedrin and sleep, will have auras if very bad.     Hx of environmental allergies, uses Claritin as needed. Moved from [place].     Heartburn: Has symptoms nearly daily for 5 years now. Uses a ton of Pepcid. Works temporarily, 2 hours, then needs to repeat. Has not had an EGD. Has not been tested for H Pylori. No hematemesis. No black tarry stools or bright red stools. Any type of food seems to flare symptoms. Used Nexium, \"flipped out\", hallucinated.     Anxiety: Symptoms since pregnancy with son. Has both panic attacks and generalized anxiety. Has panic attacks worse at night and occasionally during the day. Anxious about work, weather, money issues. Lays down and tries to ride it out. Hasn't taken medication for anxiety in the past. Hasn't worked with a therapist.     Thinks he has low testosterone, thinks related to long term use of methadone. Used to be 210 lb, now 150 lb. No sexual desire, low energy.     Night sweats - wakes up in puddles of sweat, going on for 6-7 years.     Dizziness - last few days, lightheaded. Orthostatic symptoms.     Palpitations/heart fluttering with panic attacks. "     Muscle cramps - mainly in back from past injury, uses massage to help. Back injury when 20yo.     Sleep issues - difficulty sleeping entire life. Takes hours to fall asleep, wakes up every 3-4 hours. Very light sleeper.     PHQ9: 15/27, very difficult  GAD7: 17/21, very difficult    Healthy Habits  Do you typically exercising at least 40 min, 3-4 times per week?  Yes - regularly exercises, work is busy, physically demanding  Do you usually eat at least 4 servings of fruit and vegetables a day, include whole grains and fiber and avoid regularly eating high fat foods? NO - poor appetite, eats 1x per day   Have you had an eye exam in the past two years? NO - no concerns   Do you see a dentist twice per year? NO - going to get dentures, cost prohibitis   Do you have any concerns regarding sleep? YES - see above    Safety Screen  If you own firearms, are they secured in a locked gun cabinet or with trigger locks? Yes  Do you feel you are safe where you are living?: [facility]U[facility] (7/16/2019  9:15 AM)  Do you feel you are safe in your relationship(s)?: YUSUF (7/16/2019  9:15 AM)    ROS:   See HPI above. Remaining 14 point ROS negative.     PMH:   Patient Active Problem List   Diagnosis     Chronic gastritis without bleeding     Seasonal allergic rhinitis, unspecified trigger     Migraine without aura and without status migrainosus, not intractable     Generalized anxiety disorder with panic attacks     Night sweats     Current moderate episode of major depressive disorder, unspecified whether recurrent (H)     Methadone use (H)     Loss of weight     Past Medical History:   Diagnosis Date     History of being hospitalized 2010    States mild heart attack and stroke s/p opioid withdrawal without residual problems     PSH:   Past Surgical History:   Procedure Laterality Date     TENDON REPAIR      left pinky finger       MEDICATIONS:   Current Outpatient Medications on File Prior to Visit   Medication Sig Dispense Refill      "loratadine (CLARITIN) 10 mg tablet Take 10 mg by mouth daily as needed for allergies.       methadone (DOLOPHINE) 10 mg/mL solution Take 140 mg by mouth daily.       [DISCONTINUED] ondansetron (ZOFRAN ODT) 4 MG disintegrating tablet Take 1 tablet (4 mg total) by mouth every 8 (eight) hours as needed. 12 tablet 0     No current facility-administered medications on file prior to visit.      ALLERGIES:  No Known Allergies    FAMHx:  Family History   Problem Relation Age of Onset     Heart disease Mother      Hypertension Mother      Hyperlipidemia Mother      Anesthesia problems Mother         behavioral changes, no personal reaction     Diabetes Father      Hypertension Father      Hyperlipidemia Father      Lung disease Father      No Medical Problems Brother      Diabetes Maternal Grandmother      Stroke Maternal Grandmother      Deep vein thrombosis Maternal Grandmother      No Medical Problems Brother      No Medical Problems Brother      Colon cancer Neg Hx      Prostate cancer Neg Hx        SOCIAL HISTORY:   Social History     Tobacco Use     Smoking status: Former Smoker     Packs/day: 1.00     Last attempt to quit: 2019     Years since quittin.5     Smokeless tobacco: Never Used     Tobacco comment: Vap   Substance Use Topics     Alcohol use: Not Currently     Drug use: Not on file       PHYSICAL EXAM:   /78   Pulse 88   Temp 98.2  F (36.8  C) (Oral)   Resp 16   Ht 5' 11.25\" (1.81 m)   Wt 147 lb 5 oz (66.8 kg)   BMI 20.40 kg/m     GENERAL: Matt is a pleasant, thin, non-toxic appearing male in no acute distress.   HEENT: Sclera white, pupils appear normal, tympanic membranes normal bilaterally, no nasal discharge, oropharynx pink and moist, no cervical lymphadenopathy, no thyromegaly or thyroid nodules.   HEART: Regular rate and rhythm, no murmurs.   LUNGS: Clear to auscultation bilaterally, unlabored.   ABDOMEN: Soft, mildly tender to palpation in epigastric region without rebound tenderness, " no palpable masses   MSK: No gross deformities  NEURO: Speech intact, face symmetrical, normal gait, moving all extremities without difficulty  PSYCH: depressed and anxious, flat affect, appropriately groomed, slightly disheveled appearing    EK bpm, normal sinus rhythm, normal axis, normal intervals. Isolated Q wave in aVL. No ST changes. No T wave inversions. Unchanged from last EKG in 2019;.     ASSESSMENT & PLAN:   Matt Ramirez is a 33 y.o. male presenting today for annual physical exam and to establish care for chronic problems.     1. Encounter for routine adult health examination with abnormal findings  Matt presents today to establish care and for annual physical exam.  Past medical history reviewed in epic chart updated.  Reviewed health maintenance recommendations and he is due for tetanus booster which was given today per below.  He is fasting and requests routine screening labs today, obtained a fasting glucose and lipid panel today.    2. Chronic gastritis without bleeding, unspecified gastritis type  - H. pylori Antigen, Stool; Future  - Ambulatory referral for Upper GI Endoscopy  - Comprehensive Metabolic Panel  - Lipase    Matt reports greater than 5 years of gastritis symptoms.  He is currently taking an H2 blocker which improved symptoms, but relief is brief.  He reports not tolerating PPIs.  He has not had an EGD or H. pylori testing in the past.    We will proceed with H. pylori testing and EGD to further evaluate symptoms.  PPI use recommendation pending GI evaluation.  Given long-standing abdominal pain along with weight loss, night sweats, and anemia, will also obtain CMP and lipase to screen for other etiologies of chronic pain.  Consider abdominal imaging if results are unrevealing.    3. Methadone use (H)  - Testosterone, Total; Future  - Electrocardiogram Perform and Read    Matt is currently following with the [facility].  He is wondering if there  are any other more local options for methadone prescriptions.  He states he has been on methadone since his hospitalization in 2010.  He reports he initiated opioid use due to chronic back pain and was unable to wean due to withdrawal side effects, reportedly mini stroke and heart attack, and therefore was recommended to be on chronic methadone.  He states he has withdrawal symptoms when tapering methadone, so is on 140 mg daily.    EKG is normal today, obtained for monitoring due to chronic methadone prescription.  I am concerned about possible side effects from methadone including his chronic abdominal pain, anorexia (weight loss), concerns for low testosterone, depression, and anxiety.    Matt is interested in a more local option.  Waiting to hear back from specialty  for local options for methadone treatment/prescribing.  If no other local options, will obtain release of information at follow-up appointment for his prescribing office.  I am concerned that he may need to wean his dose of methadone given his comorbid symptoms.    4. Current moderate episode of major depressive disorder, unspecified whether recurrent (H)  5. Generalized anxiety disorder with panic attacks  - escitalopram oxalate (LEXAPRO) 10 MG tablet; Take 1 tablet (10 mg total) by mouth daily. Take 1/2 tablet (5mg) for first week. Increase as tolerated.  Dispense: 30 tablet; Refill: 2  - Ambulatory referral to Psychology    PHQ9 is 15/27, GAD7 is 17/21.  Currently is not receiving any treatment for mood disorder.  Recommended initiation of psychotherapy, referral placed.  Patient provided with referral information and contact numbers for local psychotherapy offices.  Additionally, recommended initiation of an SSRI medication.  We will start with Lexapro 5 mg daily.  If tolerating it helpful, can increase to 10 mg daily.  Recommended follow-up on mood in 4 weeks.    Given prescription of chronic methadone, will cautiously titrate  Lexapro and monitor for symptoms of serotonin syndrome.    6. Microcytic anemia  Microcytic anemia identified in the emergency department in February 2019.  No subsequent follow-up until today.  Awaiting CBC from today's visit.  Concerned that this may be occult loss from GI source, as per above recommend EGD.    7. Loss of weight  8. Night sweats  - HM1(CBC and Differential)  - Comprehensive Metabolic Panel  - Lipase  - QTF-Mycobacterium tuberculosis by QuantiFERON-TB Gold Plus  - Thyroid [place]    Concern regarding persistent constitutional symptoms and weight loss of approximately 60 pounds.  Will initially evaluate with CBC with differential, CMP, lipase, TB screen, and thyroid cascade.  If testing is unrevealing, would consider further abdominal imaging given focal symptoms and weaning of methadone after discussion with methadone prescriber.    9. Migraine without aura and without status migrainosus, not intractable  - SUMAtriptan (IMITREX) 50 MG tablet; Take 1 tablet (50 mg total) by mouth once as needed for migraine (Repeat in 2 hours if needed.).  Dispense: 18 tablet; Refill: 2    History of migraines.  Blood pressure is in the prehypertensive range.  Given infrequent use, will prescribe Imitrex.  Did recommend taking at onset of headache, may repeat 1 time if symptoms not improving in 2 hours.  Recommended use no more than 9 headaches per month due to concern for possible rebound migraines.  Counseled that if blood pressure is elevated, will need to discontinue Imitrex.    10. Seasonal allergic rhinitis, unspecified trigger  Patient utilizes over-the-counter Claritin as needed.  Continue this regimen.    11. Encounter for screening for diabetes mellitus  - Comprehensive Metabolic Panel    12. Encounter for screening for lipoid disorders  - Lipid [place] FASTING    13. Immunization due  - Tdap vaccine,  8yo or older,  IM    RTC: 4 weeks - follow up mood    Yuliana Reddy DO

## 2021-05-30 NOTE — PROGRESS NOTES
Please call Matt with his lab results.  Thank you.    Leia Gutierrez,    It was very nice to meet you in clinic the other day.  Most of your lab results have returned now.  Your testosterone level was normal.  Do not need to do the fasting testosterone level anymore as your testosterone was upper limit of normal and it would not provide any further information. Your cholesterol levels look excellent.  Your kidney function, electrolytes, and liver tests were all normal.  Thyroid test was also in the normal range.  You currently are mildly anemic, but this is improved compared to last blood test 5 months ago. I think it is important to follow-up with a EGD to evaluate for possible source of bleeding.  I am waiting on your H. pylori test and your TB test.    I will let you know when we have the rest of the results back.  Let me know if you have any other questions or concerns in the meantime.  I look forward to seeing you back in a few weeks.    Yuliana Reddy, DO

## 2021-05-30 NOTE — PROGRESS NOTES
Please call Matt.  TB screen was negative.  I checked this due to night sweats and weight loss.   Yuliana Reddy, DO

## 2021-05-30 NOTE — TELEPHONE ENCOUNTER
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  Dr. 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 Would he like to cancel this appt? Or still come in to see her?

## 2021-05-30 NOTE — TELEPHONE ENCOUNTER
----- Message from Yuliana Reddy DO sent at 7/26/2019  8:46 AM CDT -----  Please call Matt. His H pylori test has returned and is negative.   I look forward to seeing Matt back in a few weeks.  Yuliana Reddy DO

## 2021-05-30 NOTE — TELEPHONE ENCOUNTER
Left message to call back for: lab results  Information to relay to patient:  Please relay Dr. Reddy's message below.  Please document call was returned.

## 2021-05-31 NOTE — PROGRESS NOTES
[facility] Note    Name: Matt Ramirez  : 1985   MRN: [id number]    Matt Ramirez is a 33 y.o. male presenting to discuss the following:     CC:   Chief Complaint   Patient presents with     Follow-up       HPI:  Is on omeprazole 40mg two times a day, had 2 ulcers, 1 in stomach and 1 in upper small intestines. Heartburn feeling better.     PHQ-9:   GAD7: 15/21  Hasn't noticed any difference with Lexapro yet.   Hasn't gotten into therapy yet.     Still reports fatigue, low energy, low libido.  He is reassured after normal testosterone levels.  Does not plan to adjust his methadone.  He is following with the methadone clinic for this.    No other concerns today.    ROS:   See HPI above.     PMH:   Patient Active Problem List   Diagnosis     Chronic gastritis without bleeding     Seasonal allergic rhinitis, unspecified trigger     Migraine without aura and without status migrainosus, not intractable     Generalized anxiety disorder with panic attacks     Night sweats     Current moderate episode of major depressive disorder, unspecified whether recurrent (H)     Methadone use (H)     Loss of weight     Microcytic anemia       Past Medical History:   Diagnosis Date     History of being hospitalized 2010    States mild heart attack and stroke s/p opioid withdrawal without residual problems       PSH:   Past Surgical History:   Procedure Laterality Date     TENDON REPAIR      left pinky finger       MEDICATIONS:   Current Outpatient Medications on File Prior to Visit   Medication Sig Dispense Refill     escitalopram oxalate (LEXAPRO) 10 MG tablet Take 1 tablet (10 mg total) by mouth daily. Take 1/2 tablet (5mg) for first week. Increase as tolerated. 30 tablet 2     loratadine (CLARITIN) 10 mg tablet Take 10 mg by mouth daily as needed for allergies.       methadone (DOLOPHINE) 10 mg/mL solution Take 140 mg by mouth daily.       omeprazole (PRILOSEC) 40 MG capsule TK 1 C PO ONCE D BEFORE A  "MEAL  2     SUMAtriptan (IMITREX) 50 MG tablet Take 1 tablet (50 mg total) by mouth once as needed for migraine (Repeat in 2 hours if needed.). 18 tablet 2     No current facility-administered medications on file prior to visit.        ALLERGIES:  No Known Allergies    PHYSICAL EXAM:   /78   Pulse 72   Temp 97.9  F (36.6  C) (Oral)   Resp 16   Ht 5' 11.25\" (1.81 m)   Wt 151 lb 4 oz (68.6 kg)   BMI 20.95 kg/m     GENERAL: Matt, is a pleasant, thin male in no acute distress.  HEART: Rate and rhythm, no murmurs.  LUNGS: Clear to auscultation bilaterally, unlabored.  ABDOMEN: Abdomen soft, nontender to palpation.  PSYCH: Is depressed, affect flat.  Appropriately groomed, normal eye contact.    ASSESSMENT & PLAN:   Matt Ramirez is a 33 y.o. male presenting today for follow up mood symptoms.     1. Current moderate episode of major depressive disorder, unspecified whether recurrent (H)  2. Generalized anxiety disorder with panic attacks  - escitalopram oxalate (LEXAPRO) 10 MG tablet; Take 2 tablets (20 mg total) by mouth daily.  Dispense: 60 tablet; Refill: 2    Trial increase Lexapro dose.  Increase to 15 mg daily, take for 1 to 2 weeks, then increase further to 20 mg daily.  If tolerating, can fill 90-day supply.  Check in by phone in 6 weeks.  If medication is not effective at maximum dose, would recommend cost tapering to an alternative SSRI.  If he finds it somewhat beneficial but symptoms not 100% resolved, consider adding Wellbutrin.  I did again recommend that he see therapy.  Provided a list of local resources for this.    BECKY signed for [facility] records for EGD.    RTC: 6 weeks - phone check in for mood    Yuliana Reddy, DO       "

## 2021-05-31 NOTE — PATIENT INSTRUCTIONS - HE
Will increase Lexapro. Lets do 15mg daily (1.5 tablets), you can break this up between 2 times daily. If toleratingin 1-2 weeks, increase to 2 tablets.   I will check in with you in 6 weeks to see how things are going. We can change to a different medication if this one isn't helping, or alternatively if helping but not 100% where we want to be, we can add a second medication to help.

## 2021-05-31 NOTE — TELEPHONE ENCOUNTER
Patient Returning Call  Reason for call:  Patient is returning a message.  Information relayed to patient:  Below message from Dr. Reddy that H pylori is negative.   Patient has additional questions:  Yes  If YES, what are your questions/concerns:  Patient continues to have heartburn and reflux symptoms. Patient was told to follow-up with [facility] gave number to schedule [phone]  Okay to leave a detailed message?: No call back needed

## 2021-06-01 NOTE — TELEPHONE ENCOUNTER
----- Message from Yuliana Reddy DO sent at 10/1/2019  4:35 PM CDT -----  Please call Matt to check on his mood. I attempted to call and no answer.     I am wondering how Lexapro is working and what dose he is currently on? If it is effective, we can keep it the same. If partially effective, we can add a second medication (Wellbutrin). If not effective, he should come in and we can change to a different medication.    Yuliana Reddy DO

## 2021-06-01 NOTE — TELEPHONE ENCOUNTER
Left message for pt to call back.  Please relay Dr. Reddy's message below and document pt's response.

## 2021-06-03 VITALS
HEART RATE: 85 BPM | HEIGHT: 71 IN | WEIGHT: 158 LBS | OXYGEN SATURATION: 99 % | TEMPERATURE: 98.2 F | RESPIRATION RATE: 16 BRPM | SYSTOLIC BLOOD PRESSURE: 118 MMHG | DIASTOLIC BLOOD PRESSURE: 68 MMHG | BODY MASS INDEX: 22.12 KG/M2

## 2021-06-03 VITALS — HEIGHT: 71 IN | BODY MASS INDEX: 20.62 KG/M2 | WEIGHT: 147.31 LBS

## 2021-06-03 VITALS — HEIGHT: 71 IN | BODY MASS INDEX: 21.18 KG/M2 | WEIGHT: 151.25 LBS

## 2021-06-03 NOTE — TELEPHONE ENCOUNTER
Refill Approved    Rx renewed per Medication Renewal Policy. Medication was last renewed on 8/20/19.    Katharine Granda, [facility] Triage/Med Refill 11/8/2019     Requested Prescriptions   Pending Prescriptions Disp Refills     escitalopram oxalate (LEXAPRO) 20 MG tablet [Pharmacy Med Name: ESCITALOPRAM OXALATE 20MG TABS] 30 tablet 2     Sig: TAKE ONE TABLET BY MOUTH ONCE DAILY       SSRI Refill Protocol  Passed - 11/8/2019  3:57 PM        Passed - PCP or prescribing provider visit in last year     Last office visit with prescriber/PCP: 8/20/2019 Yuliana Reddy DO OR same dept: 11/4/2019 Miri Becerra MD OR same specialty: 11/4/2019 Miri Becerra MD  Last physical: 7/16/2019 Last [facility] visit: Visit date not found   Next visit within 3 mo: Visit date not found  Next physical within 3 mo: Visit date not found  Prescriber OR PCP: Yuliana Reddy DO  Last diagnosis associated with med order: 1. Current moderate episode of major depressive disorder, unspecified whether recurrent (H)  - escitalopram oxalate (LEXAPRO) 20 MG tablet [Pharmacy Med Name: ESCITALOPRAM OXALATE 20MG TABS]; TAKE ONE TABLET BY MOUTH ONCE DAILY  Dispense: 30 tablet; Refill: 2    2. Generalized anxiety disorder with panic attacks  - escitalopram oxalate (LEXAPRO) 20 MG tablet [Pharmacy Med Name: ESCITALOPRAM OXALATE 20MG TABS]; TAKE ONE TABLET BY MOUTH ONCE DAILY  Dispense: 30 tablet; Refill: 2    If protocol passes may refill for 12 months if within 3 months of last provider visit (or a total of 15 months).

## 2021-06-03 NOTE — PROGRESS NOTES
Assessment/ Plan     1. Cough  Patient has had a 4 to 5-day history of cough most consistent with a viral process.  I did do a chest x-ray as he has a history of pneumonia last February.  This was normal with no signs of infiltrate.  Recommend symptomatic cares.  Follow-up if spikes a fever or having worsening symptoms.  - XR Chest 2 Views    2. Sore throat  Patient's rapid strep was negative.  Backup culture is sent and will be notified only if positive.  Suspect viral process.  Recommend symptomatic treatment such as warm salt water gargles, ibuprofen and/or Tylenol for discomfort, and push fluids.  Follow-up if sore throat is not resolved over the next 7-10 days.  - Rapid Strep A Screen- Throat Swab  - Group A Strep, RNA Direct Detection, Throat      Subjective:       Matt Ramirez is a 33 y.o. male who presents for evaluation of a cough and sore throat.  He states symptoms started about 4 to 5 days ago.  He said a little bit of chest discomfort and a little bit of shortness of breath.  He thought maybe he had a low-grade fever the first couple of days.  He is not feeling achy or chilled.  His son has had a cold.  He is just concerned as he had pneumonia and he needed several courses of antibiotics and was in the emergency room a couple of times.  He is a smoker.  He denies any history of asthma or wheezing or any other lung problems.    Relevant past medical, family, surgical, and social history reviewed with patient, unless noted in HPI, not pertinent for this visit.  Medications were discussed and reconciled.   Review of Systems   A 12 point comprehensive review of systems was negative except as noted.      Current Outpatient Medications   Medication Sig Dispense Refill     escitalopram oxalate (LEXAPRO) 10 MG tablet Take 2 tablets (20 mg total) by mouth daily. 60 tablet 2     loratadine (CLARITIN) 10 mg tablet Take 10 mg by mouth daily as needed for allergies.       methadone (DOLOPHINE) 10 mg/mL solution  "Take 140 mg by mouth daily.       omeprazole (PRILOSEC) 40 MG capsule TK 1 C PO ONCE D BEFORE A MEAL  2     SUMAtriptan (IMITREX) 50 MG tablet Take 1 tablet (50 mg total) by mouth once as needed for migraine (Repeat in 2 hours if needed.). 18 tablet 2     No current facility-administered medications for this visit.        Objective:      /68   Pulse 85   Temp 98.2  F (36.8  C)   Resp 16   Ht 5' 11.25\" (1.81 m)   Wt 158 lb (71.7 kg)   SpO2 99%   BMI 21.88 kg/m        General appearance: alert, appears stated age and cooperative  Head: Normocephalic, without obvious abnormality, atraumatic  Eyes: conjunctivae/corneas clear.   Ears: normal TM's and external ear canals both ears  Nose: Nares normal. Septum midline. Mucosa normal. No drainage or sinus tenderness.  Throat: lips, mucosa, and tongue normal; teeth and gums normal  Neck: no adenopathy  Lungs: clear to auscultation bilaterally  Heart: regular rate and rhythm, S1, S2 normal, no murmur, click, rub or gallop    Chest x-ray negative for infiltrate.,  Personally reviewed    Recent Results (from the past 168 hour(s))   Rapid Strep A Screen- Throat Swab   Result Value Ref Range    Rapid Strep A Antigen No Group A Strep detected, presumptive negative No Group A Strep detected, presumptive negative          This note has been dictated using voice recognition software. Any grammatical or context distortions are unintentional and inherent to the software  "

## 2021-06-07 NOTE — TELEPHONE ENCOUNTER
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    [phone]      Reason for Disposition    Symptoms interfere with sleep    Protocols used: ANXIETY AND PANIC ATTACK-A-AH

## 2021-06-08 NOTE — TELEPHONE ENCOUNTER
Nakia calling, BECKY on file.  Ulcer has been acting up x3 days.   In a lot of pain, rates the pain an 8/10 (severe).   Pain is located left side right under the ribs. Mid abdominal pain. Similar pain to ulcer pain in the past.   +vomiting-started Monday- 12-14 times per day  Water and tea he can keep down, everything else he vomits.   Prescribed methadone, unable to hold it down for more than 30-60 seconds.   Sitting in a shower, not able to function well.   Hasn't urinated in the last 24 hours.  Not that he can remember.    Patient states he is too weak to stand, cold, pale, clammy skin, pulse is racing. RN advised 911 per protocol recommendation. RN recommended laying down with feet elevated and calling 911.  Gurjit stated understanding.    Elle Munson, RN   [facility] RN Triage      Reason for Disposition    Shock suspected (e.g., cold/pale/clammy skin, too weak to stand, low BP, rapid pulse)    Protocols used: VOMITING-A-AH

## 2021-06-09 NOTE — TELEPHONE ENCOUNTER
Tried to call Nakia back - call picks up but nothing but static. Tried to call patient - rings with no vm

## 2021-06-09 NOTE — TELEPHONE ENCOUNTER
I have not seen Mr. Ramirez since last August and am not sure what I can do to help at this time. It sounds like the ED is an appropriate place to be. I anticipate the ED provider would also be able to make the recommendation he be seen by [facility] despite the outstanding bill. Lets see what happens at the ED.    Yuliana Reddy, DO

## 2021-06-09 NOTE — TELEPHONE ENCOUNTER
Patient Returning Call  Reason for call:  Return call.  Information relayed to patient:  Patient's wife, Nakia, was informed of Yuliana Reddy DO's response below.  Patient has additional questions:  No  If YES, what are your questions/concerns:  n/a  Okay to leave a detailed message?: No call back needed

## 2021-06-09 NOTE — TELEPHONE ENCOUNTER
Who is calling:  The patient's wife  Reason for Call:  The caller states she is taking the patient into the ED in about 30 minutes because the patient's gastroenterology symptoms are getting worse. The caller states the patient has  heartburn, vomiting and abdominal pain. The caller is not with the patient at the time of this call and does not know how many times a day the patient is vomiting, she states about ten times per day. The caller declines triage.The caller states the patient has an outstanding bill with [facility] and they will not see him unless Yuliana Reddy DO informs [facility]  the patient needs to be seen.   Date of last appointment with primary care:   Okay to leave a detailed message: Yes

## 2021-06-16 PROBLEM — K31.5 DUODENAL STENOSIS: Status: ACTIVE | Noted: 2020-02-07

## 2021-06-16 PROBLEM — G43.009 MIGRAINE WITHOUT AURA AND WITHOUT STATUS MIGRAINOSUS, NOT INTRACTABLE: Status: ACTIVE | Noted: 2019-07-16

## 2021-06-16 PROBLEM — R63.4 LOSS OF WEIGHT: Status: ACTIVE | Noted: 2019-07-16

## 2021-06-16 PROBLEM — D50.9 MICROCYTIC ANEMIA: Status: ACTIVE | Noted: 2019-07-16

## 2021-06-16 PROBLEM — F41.1 GENERALIZED ANXIETY DISORDER WITH PANIC ATTACKS: Status: ACTIVE | Noted: 2019-07-16

## 2021-06-16 PROBLEM — K27.9 PEPTIC ULCER DISEASE: Status: ACTIVE | Noted: 2019-07-16

## 2021-06-16 PROBLEM — J30.2 SEASONAL ALLERGIC RHINITIS, UNSPECIFIED TRIGGER: Status: ACTIVE | Noted: 2019-07-16

## 2021-06-16 PROBLEM — F11.90 METHADONE USE: Status: ACTIVE | Noted: 2019-07-16

## 2021-06-16 PROBLEM — R61 NIGHT SWEATS: Status: ACTIVE | Noted: 2019-07-16

## 2021-06-16 PROBLEM — F41.0 GENERALIZED ANXIETY DISORDER WITH PANIC ATTACKS: Status: ACTIVE | Noted: 2019-07-16

## 2021-06-16 PROBLEM — F32.1 CURRENT MODERATE EPISODE OF MAJOR DEPRESSIVE DISORDER, UNSPECIFIED WHETHER RECURRENT (H): Status: ACTIVE | Noted: 2019-07-16

## 2021-06-17 NOTE — PATIENT INSTRUCTIONS - HE
Patient Instructions by Yuliana Reddy DO at 7/16/2019  9:20 AM     Author: Yuliana Reddy DO Service: -- Author Type: Physician    Filed: 7/16/2019 10:10 AM Encounter Date: 7/16/2019 Status: Addendum    : Yuliana Reddy DO (Physician)    Related Notes: Original Note by Yuliana Reddy DO (Physician) filed at 7/16/2019 10:10 AM       Thanks for coming in today.     Start Lexapro 5mg daily (1/2 tablet). Take with food. Increase to full tablet if tolerating in 1-2 weeks. Referral to psychologist.     Start sumatriptan as needed for migraine headaches. Use at onset of headache, can repeat once in 2 hours if not improving.    Return for testosterone testing at 8am. Schedule lab only appointment.     Referral for EGD for work up of weight loss and chronic stomach symptoms.     I will follow up with recommendations for methadone prescribing physicians after I talk with our .       Patient Education     Prevention Guidelines, Men Ages 40 to 49  Screening tests and vaccines are an important part of managing your health. A screening test is done to find possible disorders or diseases in people who don't have any symptoms. The goal is to find a disease early so lifestyle changes can be made and you can be watched more closely to reduce the risk of disease, or to detect it early enough to treat it most effectively. Screening tests are not considered diagnostic, but are used to determine if more testing is needed. Health counseling is essential, too. Below are guidelines for these, for men ages 40 to 49. Talk with your healthcare provider to make sure youre up to date on what you need.  Screening Who needs it How often   Alcohol misuse All men in this age group At routine exams   Blood pressure All men in this age group Yearly checkup if your blood pressure reading is normal  Normal blood pressure is less than 120/80 mm Hg  If your blood pressure is higher than normal, follow the advice of your healthcare  provider      Depression All men in this age group At routine exams   Type 2 diabetes or prediabetes All men beginning at age 45 and men  without symptoms at any age who are overweight or obese and have 1 or more other risk factors for diabetes At least every 3 years (yearly if blood sugar has begun to rise)   Type 2 diabetes All men with prediabetes Every year   Hepatitis C Men at increased risk for infection - talk with your healthcare provider At routine exams   High cholesterol or triglycerides All men ages 35 and older, and younger men at high risk for coronary artery disease At least every 5 years   HIV All men At routine exams   Obesity All men in this age group At routine exams   Prostate cancer Starting at age 45, talk to healthcare provider about risks and benefits of digital rectal exam (RITA) and prostate-specific antigen (PSA) screening1 At routine exams   Syphilis Men at increased risk for infection - talk with your healthcare provider At routine exams   Tuberculosis Men at increased risk for infection - talk with your healthcare provider Check with your healthcare provider   Vision All men in this age group Every 2 to 4 years if no risk factors for eye disease2   Vaccine Who needs it How often   Chickenpox (varicella) All men in this age group who have no record of this infection or vaccine 2 doses; the second dose should be given at least 4 weeks after the first dose   Hepatitis A Men at increased risk for infection - talk with your healthcare provider 2 doses given at least 6 months apart   Hepatitis B Men at increased risk for infection - talk with your healthcare provider 3 doses over 6 months; second dose should be given 1 month after the first dose; the third dose should be given at least 2 months after the second dose and at least 4 months after the first dose   Haemophilus influenzae Type B (HIB) Men at increased risk for infection - talk with your healthcare provider 1 to 3 doses   Influenza  (flu) All men in this age group Once a year   Measles, mumps, rubella (MMR) All men in this age group who have no record of these infections or vaccines 1 or 2 doses   Meningococcal Men at increased risk for infection - talk with your healthcare provider 1 or more doses   Pneumococcal conjugate vaccine (PCV13) and pneumococcal polysaccharide vaccine (PPSV23) Men at increased risk for infection - talk with your healthcare provider PCV13: 1 dose ages 19 to 65 (protects against 13 types of pneumococcal bacteria)     PPSV23: 1 to 2 doses through age 64, or 1 dose at 65 or older (protects against 23 types of pneumococcal bacteria)      Tetanus/diphtheria/  pertussis (Td/Tdap) booster All men in this age group Td every 10 years, or a one-time dose of Tdap instead of a Td booster after age 18, then Td every 10 years   Counseling Who needs it How often   Diet and exercise Men who are overweight or obese When diagnosed, and then at routine exams   Sexually transmitted infection prevention Men at increased risk for infection - talk with your healthcare provider At routine exams   Use of daily aspirin Men ages 45 to 79 at risk for cardiovascular health problems At routine exams   Use of tobacco and the health effects it can cause All men in this age group Every exam   [address] [place] [address] [facility]   [facility] [employer]  Date Last Reviewed: 2/1/2017 2000-2017 [employer], [employer]. [address] [place] [address], [place] [address]. All rights reserved. This information is not intended as a substitute for professional medical care. Always follow your healthcare professional's instructions.

## 2022-02-28 ENCOUNTER — HOSPITAL ENCOUNTER (EMERGENCY)
Facility: HOSPITAL | Age: 37
Discharge: HOME | End: 2022-02-28
Attending: EMERGENCY MEDICINE | Admitting: EMERGENCY MEDICINE
Payer: COMMERCIAL

## 2022-02-28 VITALS
OXYGEN SATURATION: 97 % | DIASTOLIC BLOOD PRESSURE: 80 MMHG | TEMPERATURE: 98.9 F | SYSTOLIC BLOOD PRESSURE: 124 MMHG | BODY MASS INDEX: 21.7 KG/M2 | HEART RATE: 69 BPM | WEIGHT: 160 LBS | RESPIRATION RATE: 20 BRPM

## 2022-02-28 DIAGNOSIS — F11.93 OPIOID WITHDRAWAL (H): ICD-10-CM

## 2022-02-28 DIAGNOSIS — R10.13 EPIGASTRIC PAIN: ICD-10-CM

## 2022-02-28 LAB
ALBUMIN SERPL-MCNC: 3.9 G/DL (ref 3.5–5)
ALP SERPL-CCNC: 112 U/L (ref 45–120)
ALT SERPL W P-5'-P-CCNC: 18 U/L (ref 0–45)
ANION GAP SERPL CALCULATED.3IONS-SCNC: 9 MMOL/L (ref 5–18)
AST SERPL W P-5'-P-CCNC: 21 U/L (ref 0–40)
BILIRUB DIRECT SERPL-MCNC: 0.1 MG/DL
BILIRUB SERPL-MCNC: 0.2 MG/DL (ref 0–1)
BUN SERPL-MCNC: 12 MG/DL (ref 8–22)
CALCIUM SERPL-MCNC: 9.4 MG/DL (ref 8.5–10.5)
CHLORIDE BLD-SCNC: 103 MMOL/L (ref 98–107)
CO2 SERPL-SCNC: 28 MMOL/L (ref 22–31)
CREAT SERPL-MCNC: 0.83 MG/DL (ref 0.7–1.3)
ERYTHROCYTE [DISTWIDTH] IN BLOOD BY AUTOMATED COUNT: 12.2 % (ref 10–15)
GFR SERPL CREATININE-BSD FRML MDRD: >90 ML/MIN/1.73M2
GLUCOSE BLD-MCNC: 93 MG/DL (ref 70–125)
HCT VFR BLD AUTO: 41.4 % (ref 40–53)
HGB BLD-MCNC: 13.9 G/DL (ref 13.3–17.7)
LIPASE SERPL-CCNC: 21 U/L (ref 0–52)
MCH RBC QN AUTO: 29.9 PG (ref 26.5–33)
MCHC RBC AUTO-ENTMCNC: 33.6 G/DL (ref 31.5–36.5)
MCV RBC AUTO: 89 FL (ref 78–100)
PLATELET # BLD AUTO: 208 10E3/UL (ref 150–450)
POTASSIUM BLD-SCNC: 3.9 MMOL/L (ref 3.5–5)
PROT SERPL-MCNC: 7 G/DL (ref 6–8)
RBC # BLD AUTO: 4.65 10E6/UL (ref 4.4–5.9)
SODIUM SERPL-SCNC: 140 MMOL/L (ref 136–145)
WBC # BLD AUTO: 8 10E3/UL (ref 4–11)

## 2022-02-28 PROCEDURE — 85027 COMPLETE CBC AUTOMATED: CPT | Performed by: EMERGENCY MEDICINE

## 2022-02-28 PROCEDURE — 96361 HYDRATE IV INFUSION ADD-ON: CPT

## 2022-02-28 PROCEDURE — 250N000013 HC RX MED GY IP 250 OP 250 PS 637: Performed by: EMERGENCY MEDICINE

## 2022-02-28 PROCEDURE — 250N000009 HC RX 250: Performed by: EMERGENCY MEDICINE

## 2022-02-28 PROCEDURE — 250N000011 HC RX IP 250 OP 636: Performed by: EMERGENCY MEDICINE

## 2022-02-28 PROCEDURE — 96374 THER/PROPH/DIAG INJ IV PUSH: CPT

## 2022-02-28 PROCEDURE — 36415 COLL VENOUS BLD VENIPUNCTURE: CPT | Performed by: EMERGENCY MEDICINE

## 2022-02-28 PROCEDURE — 80053 COMPREHEN METABOLIC PANEL: CPT | Performed by: EMERGENCY MEDICINE

## 2022-02-28 PROCEDURE — 96375 TX/PRO/DX INJ NEW DRUG ADDON: CPT

## 2022-02-28 PROCEDURE — 83690 ASSAY OF LIPASE: CPT | Performed by: EMERGENCY MEDICINE

## 2022-02-28 PROCEDURE — 99284 EMERGENCY DEPT VISIT MOD MDM: CPT | Mod: 25

## 2022-02-28 PROCEDURE — 258N000003 HC RX IP 258 OP 636: Performed by: EMERGENCY MEDICINE

## 2022-02-28 PROCEDURE — 82248 BILIRUBIN DIRECT: CPT | Performed by: EMERGENCY MEDICINE

## 2022-02-28 RX ORDER — ONDANSETRON 4 MG/1
4 TABLET, ORALLY DISINTEGRATING ORAL EVERY 6 HOURS PRN
Qty: 10 TABLET | Refills: 0 | Status: SHIPPED | OUTPATIENT
Start: 2022-02-28

## 2022-02-28 RX ORDER — ONDANSETRON 2 MG/ML
8 INJECTION INTRAMUSCULAR; INTRAVENOUS ONCE
Status: COMPLETED | OUTPATIENT
Start: 2022-02-28 | End: 2022-02-28

## 2022-02-28 RX ORDER — KETOROLAC TROMETHAMINE 30 MG/ML
15 INJECTION, SOLUTION INTRAMUSCULAR; INTRAVENOUS ONCE
Status: COMPLETED | OUTPATIENT
Start: 2022-02-28 | End: 2022-02-28

## 2022-02-28 RX ORDER — METHADONE HYDROCHLORIDE 10 MG/ML
160 CONCENTRATE ORAL ONCE
Status: COMPLETED | OUTPATIENT
Start: 2022-02-28 | End: 2022-02-28

## 2022-02-28 RX ORDER — ONDANSETRON 8 MG/1
8 TABLET, ORALLY DISINTEGRATING ORAL ONCE
Status: DISCONTINUED | OUTPATIENT
Start: 2022-02-28 | End: 2022-02-28

## 2022-02-28 RX ORDER — SUCRALFATE 1 G/1
1 TABLET ORAL 4 TIMES DAILY
Qty: 20 TABLET | Refills: 0 | Status: SHIPPED | OUTPATIENT
Start: 2022-02-28 | End: 2022-03-05

## 2022-02-28 RX ADMIN — KETOROLAC TROMETHAMINE 15 MG: 30 INJECTION, SOLUTION INTRAMUSCULAR; INTRAVENOUS at 21:47

## 2022-02-28 RX ADMIN — METHADONE HYDROCHLORIDE 160 MG: 10 CONCENTRATE ORAL at 22:21

## 2022-02-28 RX ADMIN — ONDANSETRON 8 MG: 2 INJECTION INTRAMUSCULAR; INTRAVENOUS at 21:06

## 2022-02-28 RX ADMIN — FAMOTIDINE 20 MG: 10 INJECTION, SOLUTION INTRAVENOUS at 21:09

## 2022-02-28 RX ADMIN — SODIUM CHLORIDE 1000 ML: 9 INJECTION, SOLUTION INTRAVENOUS at 21:07

## 2022-02-28 ASSESSMENT — ENCOUNTER SYMPTOMS
FEVER: 0
ABDOMINAL PAIN: 1
NAUSEA: 1
VOMITING: 1

## 2022-03-01 NOTE — ED PROVIDER NOTES
EMERGENCY DEPARTMENT ENCOUNTER      NAME: Matt Ramirez  AGE: 36 year old male  YOB: 1985  MRN: [id number]  EVALUATION DATE & TIME: No admission date for patient encounter.    PCP: Partha Gregg Fei    ED PROVIDER: Johnny Powell M.D.      Chief Complaint   Patient presents with     Vomiting         FINAL IMPRESSION:  1. Epigastric pain    2. Opioid withdrawal (H)          ED COURSE & MEDICAL DECISION MAKING:    Pertinent Labs & Imaging studies reviewed. (See chart for details)  ED Course as of 02/28/22 2152 Mon Feb 28, 2022 2040 Patient is a 36-year-old gentleman with a history of gastritis, ulcers, opioid dependence on methadone who presents with 4 days of nausea, vomiting, epigastric pain.  He is concerned that he is in methadone withdrawal, pain is consistent with prior gastritis pain.  On arrival he is normal blood pressure, heart rate is mildly elevated at 103.  He is mildly uncomfortable appearing.  Epigastric tenderness.  Plan to give IV fluids, check lipase, hepatic panel, basic blood work.  Will give IV Zofran, Toradol.  If he is able to tolerate p.o. we can give him his home dose of methadone and then he can follow-up in clinic tomorrow.  He may need to go home with Carafate and PPI with GI follow-up.   2138 Lab work is all normal.  Normal lipase, fatty panel, BMP and CBC.  He has epigastric tenderness but at this point I do not think he needs a CT scan.   2139 If he tolerates oral challenge with water then he can get his home dose of methadone and will ready for discharge.  He will follow-up with his methadone clinic tomorrow. He was also encouraged to follow-up with his primary care doctor with his recurrent gastritis.   2151 Pt was able to drink water without vomiting.         Additional ED Course Timestamps:  8:34 PM I met with the patient, obtained an initial history, performed an examination and discussed the plan. PPE worn throughout all interactions with the patient,  including gloves, surgical mask, N95 mask, safety glasses, and surgical cap.     At the conclusion of the encounter I discussed the results of all of the tests and the disposition. The questions were answered. The patient or family acknowledged understanding and was agreeable with the care plan.       MEDICATIONS GIVEN IN THE EMERGENCY:  Medications   methadone (DOLOPHINE-INTENSOL) 10 MG/ML (HIGH CONC) solution 160 mg (has no administration in time range)   ondansetron (ZOFRAN) injection 8 mg (8 mg Intravenous Given 2/28/22 2106)   0.9% sodium chloride BOLUS (1,000 mLs Intravenous New Bag 2/28/22 2107)   ketorolac (TORADOL) injection 15 mg (15 mg Intravenous Given 2/28/22 2147)   famotidine (PEPCID) injection 20 mg (20 mg Intravenous Given 2/28/22 2109)         NEW PRESCRIPTIONS STARTED AT TODAY'S ER VISIT  New Prescriptions    ONDANSETRON (ZOFRAN-ODT) 4 MG ODT TAB    Take 1 tablet (4 mg) by mouth every 6 hours as needed for nausea    SUCRALFATE (CARAFATE) 1 GM TABLET    Take 1 tablet (1 g) by mouth 4 times daily for 5 days          =================================================================    HPI    Patient information was obtained from: Patient     Use of : N/A       Matt Ramirez is a 36 year old male with a pertinent history of peptic ulcer disease, MEDARDO, MDD, and methadone use who presents to this ED for evaluation of vomiting.     The patient reports being sick since Thursday. He endorses abdominal pain, vomiting, and nausea since onset. He reports that he is unable to keep down food or his medication and is here requesting a dose of methadone. He is otherwise in his usual state of health and denies any fever or any other concerns at this time.       Mental Health Risk Assessment      PSS-3    Date and Time Over the past 2 weeks have you felt down, depressed, or hopeless? Over the past 2 weeks have you had thoughts of killing yourself? Have you ever attempted to kill yourself? When did this  last happen? User   02/28/22 1934 no no yes more than 6 months ago FLORENCIO                  REVIEW OF SYSTEMS   Review of Systems   Constitutional: Negative for fever.   Gastrointestinal: Positive for abdominal pain, nausea and vomiting.   All other systems reviewed and are negative.       PAST MEDICAL HISTORY:  History reviewed. No pertinent past medical history.    PAST SURGICAL HISTORY:  Past Surgical History:   Procedure Laterality Date     TENDON REPAIR      left pinky finger           CURRENT MEDICATIONS:    Current Facility-Administered Medications   Medication     methadone (DOLOPHINE-INTENSOL) 10 MG/ML (HIGH CONC) solution 160 mg     Current Outpatient Medications   Medication     methadone (DOLOPHINE-INTENSOL) 10 MG/ML (HIGH CONC) solution     ondansetron (ZOFRAN-ODT) 4 MG ODT tab     sucralfate (CARAFATE) 1 GM tablet       ALLERGIES:  No Known Allergies    FAMILY HISTORY:  Family History   Problem Relation Age of Onset     Heart Disease Mother      Hypertension Mother      Hyperlipidemia Mother      Anesthesia Reaction Mother         behavioral changes, no personal reaction     Diabetes Father      Hypertension Father      Hyperlipidemia Father      LUNG DISEASE Father      No Known Problems Brother      Diabetes Maternal Grandmother      Cerebrovascular Disease Maternal Grandmother      Deep Vein Thrombosis Maternal Grandmother      No Known Problems Brother      No Known Problems Brother      Colon Cancer No family hx of      Prostate Cancer No family hx of        SOCIAL HISTORY:   Social History     Socioeconomic History     Marital status: Single     Spouse name: None     Number of children: None     Years of education: None     Highest education level: None   Occupational History     None   Tobacco Use     Smoking status: None     Smokeless tobacco: None   Substance and Sexual Activity     Alcohol use: None     Drug use: None     Sexual activity: None   Other Topics Concern     None   Social History  Narrative     None     Social Determinants of Health     Financial Resource Strain: Not on file   Food Insecurity: Not on file   Transportation Needs: Not on file   Physical Activity: Not on file   Stress: Not on file   Social Connections: Not on file   Intimate Partner Violence: Not on file   Housing Stability: Not on file       VITALS:  /85   Pulse 103   Temp 98.9  F (37.2  C) (Temporal)   Resp 18   Wt 72.6 kg (160 lb)   SpO2 96%   BMI 21.70 kg/m      PHYSICAL EXAM    Constitutional: Well developed, well nourished. Comfortable appearing.  HENT: Normocephalic, atraumatic, mucous membranes moist, nose normal. Neck- Supple, gross ROM intact.   Eyes: Pupils mid-range, conjunctiva without injection, no discharge.   Respiratory: Clear to auscultation bilaterally, no respiratory distress, no wheezing, speaks full sentences easily. No cough.  Cardiovascular: Normal heart rate, regular rhythm, no murmurs. No pedal edema, 2+ DP pulses.   GI: Soft, epigastric tenderness, no masses.  Musculoskeletal: Moving all 4 extremities intentionally and without pain. No obvious deformity.  Skin: Warm, dry, no rash.  Neurologic: Alert & oriented x 3, cranial nerves grossly intact.  Psychiatric: Affect normal, cooperative.       LAB:  All pertinent labs reviewed and interpreted.  Labs Ordered and Resulted from Time of ED Arrival to Time of ED Departure   CBC WITH PLATELETS - Normal       Result Value    WBC Count 8.0      RBC Count 4.65      Hemoglobin 13.9      Hematocrit 41.4      MCV 89      MCH 29.9      MCHC 33.6      RDW 12.2      Platelet Count 208     BASIC METABOLIC PANEL - Normal    Sodium 140      Potassium 3.9      Chloride 103      Carbon Dioxide (CO2) 28      Anion Gap 9      Urea Nitrogen 12      Creatinine 0.83      Calcium 9.4      Glucose 93      GFR Estimate >90     HEPATIC FUNCTION PANEL - Normal    Bilirubin Total 0.2      Bilirubin Direct 0.1      Protein Total 7.0      Albumin 3.9      Alkaline  Phosphatase 112      AST 21      ALT 18     LIPASE - Normal    Lipase 21         RADIOLOGY:  Reviewed all pertinent imaging. Please see official radiology report.  No orders to display       EKG:    All EKG interpretations will be found in ED course above.      I, Jacob Alexandre am serving as a scribe to document services personally performed by Dr. Johnny Powell based on my observation and the provider's statements to me. I, Johnny Powell MD attest that Jacob Alexandre is acting in a scribe capacity, has observed my performance of the services and has documented them in accordance with my direction.    Johnny Powell M.D.  Emergency Medicine  [facility] EMERGENCY DEPARTMENT  [facility] [facility] [id number] [phone]  Dept: [phone]     Johnny Powell MD  02/28/22 3481

## 2022-03-01 NOTE — ED TRIAGE NOTES
Pt reports one week of nausea and four days of emesis. Pt states that he has not been able to keep any of his medications down and has been taking extra medications to try and get some effect. Pt states that he is now out of his methadone.

## 2022-03-01 NOTE — ED NOTES
Pt is a/o x4. C/o abd pain: toradol iv given. Active bowel sound, denies n/v at this time, emesis bag on hand. Pt resting comfortably in bed.

## 2022-03-01 NOTE — DISCHARGE INSTRUCTIONS
Please call your primary care doctor to set up an appointment. You may need GI follow up in regards to your gastritis/ulcer.    Please take the Zofran as needed for nausea.  Also recommend taking Carafate for the next 5 days to help coat the stomach and let it recover.

## 2022-03-09 ENCOUNTER — HOSPITAL ENCOUNTER (EMERGENCY)
Facility: HOSPITAL | Age: 37
Discharge: HOME | End: 2022-03-09
Attending: FAMILY MEDICINE | Admitting: FAMILY MEDICINE
Payer: COMMERCIAL

## 2022-03-09 VITALS
SYSTOLIC BLOOD PRESSURE: 128 MMHG | OXYGEN SATURATION: 97 % | TEMPERATURE: 97.7 F | WEIGHT: 155 LBS | RESPIRATION RATE: 14 BRPM | HEART RATE: 81 BPM | BODY MASS INDEX: 21.02 KG/M2 | DIASTOLIC BLOOD PRESSURE: 82 MMHG

## 2022-03-09 DIAGNOSIS — R10.13 ABDOMINAL PAIN, EPIGASTRIC: ICD-10-CM

## 2022-03-09 LAB
ALBUMIN SERPL-MCNC: 3.8 G/DL (ref 3.5–5)
ALP SERPL-CCNC: 114 U/L (ref 45–120)
ALT SERPL W P-5'-P-CCNC: 15 U/L (ref 0–45)
ANION GAP SERPL CALCULATED.3IONS-SCNC: 11 MMOL/L (ref 5–18)
AST SERPL W P-5'-P-CCNC: 17 U/L (ref 0–40)
BASOPHILS # BLD AUTO: 0 10E3/UL (ref 0–0.2)
BASOPHILS NFR BLD AUTO: 1 %
BILIRUB SERPL-MCNC: 0.3 MG/DL (ref 0–1)
BUN SERPL-MCNC: 12 MG/DL (ref 8–22)
CALCIUM SERPL-MCNC: 9.6 MG/DL (ref 8.5–10.5)
CHLORIDE BLD-SCNC: 102 MMOL/L (ref 98–107)
CO2 SERPL-SCNC: 27 MMOL/L (ref 22–31)
CREAT SERPL-MCNC: 0.86 MG/DL (ref 0.7–1.3)
EOSINOPHIL # BLD AUTO: 0.2 10E3/UL (ref 0–0.7)
EOSINOPHIL NFR BLD AUTO: 4 %
ERYTHROCYTE [DISTWIDTH] IN BLOOD BY AUTOMATED COUNT: 12.4 % (ref 10–15)
GFR SERPL CREATININE-BSD FRML MDRD: >90 ML/MIN/1.73M2
GLUCOSE BLD-MCNC: 115 MG/DL (ref 70–125)
HCT VFR BLD AUTO: 44.9 % (ref 40–53)
HGB BLD-MCNC: 14.9 G/DL (ref 13.3–17.7)
HOLD SPECIMEN: NORMAL
IMM GRANULOCYTES # BLD: 0 10E3/UL
IMM GRANULOCYTES NFR BLD: 1 %
LIPASE SERPL-CCNC: 17 U/L (ref 0–52)
LYMPHOCYTES # BLD AUTO: 2 10E3/UL (ref 0.8–5.3)
LYMPHOCYTES NFR BLD AUTO: 35 %
MCH RBC QN AUTO: 30 PG (ref 26.5–33)
MCHC RBC AUTO-ENTMCNC: 33.2 G/DL (ref 31.5–36.5)
MCV RBC AUTO: 91 FL (ref 78–100)
MONOCYTES # BLD AUTO: 0.4 10E3/UL (ref 0–1.3)
MONOCYTES NFR BLD AUTO: 7 %
NEUTROPHILS # BLD AUTO: 3 10E3/UL (ref 1.6–8.3)
NEUTROPHILS NFR BLD AUTO: 52 %
NRBC # BLD AUTO: 0 10E3/UL
NRBC BLD AUTO-RTO: 0 /100
PLATELET # BLD AUTO: 194 10E3/UL (ref 150–450)
POTASSIUM BLD-SCNC: 3.8 MMOL/L (ref 3.5–5)
PROT SERPL-MCNC: 7.2 G/DL (ref 6–8)
RBC # BLD AUTO: 4.96 10E6/UL (ref 4.4–5.9)
SODIUM SERPL-SCNC: 140 MMOL/L (ref 136–145)
TROPONIN I SERPL-MCNC: <0.01 NG/ML (ref 0–0.29)
WBC # BLD AUTO: 5.7 10E3/UL (ref 4–11)

## 2022-03-09 PROCEDURE — 85025 COMPLETE CBC W/AUTO DIFF WBC: CPT

## 2022-03-09 PROCEDURE — 80053 COMPREHEN METABOLIC PANEL: CPT

## 2022-03-09 PROCEDURE — 36415 COLL VENOUS BLD VENIPUNCTURE: CPT | Performed by: FAMILY MEDICINE

## 2022-03-09 PROCEDURE — 258N000003 HC RX IP 258 OP 636: Performed by: FAMILY MEDICINE

## 2022-03-09 PROCEDURE — 250N000009 HC RX 250

## 2022-03-09 PROCEDURE — 83690 ASSAY OF LIPASE: CPT

## 2022-03-09 PROCEDURE — 96361 HYDRATE IV INFUSION ADD-ON: CPT

## 2022-03-09 PROCEDURE — 93005 ELECTROCARDIOGRAM TRACING: CPT | Performed by: FAMILY MEDICINE

## 2022-03-09 PROCEDURE — 96374 THER/PROPH/DIAG INJ IV PUSH: CPT

## 2022-03-09 PROCEDURE — 99284 EMERGENCY DEPT VISIT MOD MDM: CPT

## 2022-03-09 PROCEDURE — 250N000011 HC RX IP 250 OP 636

## 2022-03-09 PROCEDURE — 84484 ASSAY OF TROPONIN QUANT: CPT

## 2022-03-09 PROCEDURE — 82040 ASSAY OF SERUM ALBUMIN: CPT

## 2022-03-09 PROCEDURE — 250N000013 HC RX MED GY IP 250 OP 250 PS 637

## 2022-03-09 RX ORDER — METHADONE HYDROCHLORIDE 10 MG/ML
160 CONCENTRATE ORAL ONCE
Status: COMPLETED | OUTPATIENT
Start: 2022-03-09 | End: 2022-03-09

## 2022-03-09 RX ORDER — ACETAMINOPHEN 325 MG/1
975 TABLET ORAL ONCE
Status: COMPLETED | OUTPATIENT
Start: 2022-03-09 | End: 2022-03-09

## 2022-03-09 RX ORDER — ONDANSETRON 2 MG/ML
4 INJECTION INTRAMUSCULAR; INTRAVENOUS EVERY 30 MIN PRN
Status: DISCONTINUED | OUTPATIENT
Start: 2022-03-09 | End: 2022-03-09 | Stop reason: HOSPADM

## 2022-03-09 RX ORDER — METHADONE HYDROCHLORIDE 10 MG/ML
10 CONCENTRATE ORAL ONCE
Status: DISCONTINUED | OUTPATIENT
Start: 2022-03-09 | End: 2022-03-09

## 2022-03-09 RX ADMIN — LIDOCAINE HYDROCHLORIDE 30 ML: 20 SOLUTION ORAL; TOPICAL at 08:45

## 2022-03-09 RX ADMIN — SODIUM CHLORIDE 1000 ML: 9 INJECTION, SOLUTION INTRAVENOUS at 08:16

## 2022-03-09 RX ADMIN — ACETAMINOPHEN 975 MG: 325 TABLET ORAL at 08:45

## 2022-03-09 RX ADMIN — ONDANSETRON 4 MG: 2 INJECTION INTRAMUSCULAR; INTRAVENOUS at 08:45

## 2022-03-09 RX ADMIN — METHADONE HYDROCHLORIDE 160 MG: 10 CONCENTRATE ORAL at 10:48

## 2022-03-09 ASSESSMENT — ENCOUNTER SYMPTOMS
ABDOMINAL PAIN: 1
APPETITE CHANGE: 1
VOMITING: 1
BACK PAIN: 1
CONSTIPATION: 1
NAUSEA: 1
DIARRHEA: 0
PALPITATIONS: 0
DIAPHORESIS: 0
SHORTNESS OF BREATH: 0

## 2022-03-09 NOTE — Clinical Note
Matt Ramirez was seen and treated in our emergency department on 3/9/2022.  He may return to work on 03/10/2022.       If you have any questions or concerns, please don't hesitate to call.      Leslie Cobb MD

## 2022-03-09 NOTE — ED TRIAGE NOTES
Patient presents with left lower chest pain and palpitations that began this morning at about 5 am. He is also noting upper midline abdominal pain with nausea.

## 2022-03-09 NOTE — ED PROVIDER NOTES
EMERGENCY DEPARTMENT ENCOUNTER      NAME: Matt Ramirez  AGE: 36 year old male  YOB: 1985  MRN: [id number]  EVALUATION DATE & TIME: 3/9/2022  8:00 AM    PCP: Aaron Hawley    ED PROVIDER: Brown Harrison M.D.    Chief Complaint   Patient presents with     Chest Pain       FINAL IMPRESSION:  1. Abdominal pain, epigastric        ED COURSE & MEDICAL DECISION MAKING:    Pertinent Labs & Imaging studies personally reviewed and interpreted by me. (See chart for details)  8:15 AM The resident met and examined the patient.   8:31 AM Patient seen and examined, prior records reviewed.  Differential diagnosis includes but not limited to gastritis, cholecystitis, pancreatitis, colitis, enteritis, diverticulitis, urinary tract infection, myocardial infarction, pneumonia appendicitis, AAA, cholelithiasis, ischemic bowel.  Patient with long history of gastritis status post partial gastrectomy who comes in with abdominal pain, nausea, vomiting.  Mild tenderness on exam.  Multiple recent visits for same.  Certainly part of patient's symptoms are likely related to withdrawal as he is not able to take his methadone.  Labs and fluids are ordered with plan to discharge if these are reassuring.       At the conclusion of the encounter I discussed the results of all of the tests and the disposition. The questions were answered. The patient or family acknowledged understanding and was agreeable with the care plan.     PPE worn: surgical mask.     PROCEDURES:   Procedures    MEDICATIONS GIVEN IN THE EMERGENCY:  Medications   0.9% sodium chloride BOLUS (0 mLs Intravenous Stopped 3/9/22 1039)   acetaminophen (TYLENOL) tablet 975 mg (975 mg Oral Given 3/9/22 0845)   lidocaine (viscous) (XYLOCAINE) 2 % 15 mL, alum & mag hydroxide-simethicone (MAALOX) 15 mL GI Cocktail (30 mLs Oral Given 3/9/22 0845)   methadone (DOLOPHINE-INTENSOL) 10 MG/ML (HIGH CONC) solution 160 mg (160 mg Oral Given 3/9/22 1048)       NEW  PRESCRIPTIONS STARTED AT TODAY'S ER VISIT  Discharge Medication List as of 3/9/2022 10:55 AM          =================================================================    HPI    Patient information was obtained from: patient       Matt Ramirez is a 36 year old male with a pertinent history of peptic ulcer disease, s/p partial gastrectomy, GERD, duodenal stenosis, MEDARDO, microcytic anemia, and methadone use who presents to this ED via walk-in for evaluation of epigastric pain and vomiting.     Per chart review, patient has had 7 ED visits within the past 10 months for nausea/vomiting and epigastric pain. He is status post partial gastrectomy due to a history of peptic ulcer disease and chronic gastritis. Most recently was seen on 2/28/22 complaining of vomiting, epigastric pain, and opioid withdrawal. CBC, BMP, HFP and lipase were all normal. No indication for imaging. Treated with IVF, Zofran, Toradol, Methadone. Discharged with Zofran and Carafate.    Patient presents with persistent nausea, vomiting, and epigastric pain. States his symptoms have not really improved since his ED visit 10 days ago. Reports ~1 episode of vomiting per hour. Is unable to keep down food, fluids, or meds. States he vomited right after taking his morning dose of methadone today. He is concerned about going into withdrawal. Patient reports his epigastric abdominal pain is not new but feels worse than normal. States his pain started radiating into his back and left lower chest today. His epigastric pain has never radiated into his chest before. Pain is worse with movement. No pleuritic pain. No palpitations or shortness of breath. Denies diarrhea. His last bowel movement was ~1 week ago. He is passing gas. Patient is currently on methadone. Denies any other drug or alcohol use. No other complaints or concerns expressed at this time.    REVIEW OF SYSTEMS   Review of Systems   Constitutional: Positive for appetite change. Negative for  diaphoresis.   Respiratory: Negative for shortness of breath.         Negative for pleuritic pain.   Cardiovascular: Positive for chest pain (from radiating epigastric pain.). Negative for palpitations.   Gastrointestinal: Positive for abdominal pain, constipation, nausea and vomiting. Negative for diarrhea.   Musculoskeletal: Positive for back pain (from radiating epigastric pain.).   All other systems reviewed and are negative.     All other systems reviewed and negative    PAST MEDICAL HISTORY:  Past Medical History:   Diagnosis Date     Duodenal stenosis      Gastroesophageal reflux disease      Generalized anxiety disorder with panic attacks      Methadone use      Microcytic anemia      Peptic ulcer disease        PAST SURGICAL HISTORY:  Past Surgical History:   Procedure Laterality Date     PARTIAL GASTRECTOMY       TENDON REPAIR      left pinky finger       CURRENT MEDICATIONS:    No current facility-administered medications for this encounter.     Current Outpatient Medications   Medication     methadone (DOLOPHINE-INTENSOL) 10 MG/ML (HIGH CONC) solution     ondansetron (ZOFRAN-ODT) 4 MG ODT tab       ALLERGIES:  No Known Allergies    FAMILY HISTORY:  Family History   Problem Relation Age of Onset     Heart Disease Mother      Hypertension Mother      Hyperlipidemia Mother      Anesthesia Reaction Mother         behavioral changes, no personal reaction     Diabetes Father      Hypertension Father      Hyperlipidemia Father      LUNG DISEASE Father      No Known Problems Brother      Diabetes Maternal Grandmother      Cerebrovascular Disease Maternal Grandmother      Deep Vein Thrombosis Maternal Grandmother      No Known Problems Brother      No Known Problems Brother      Colon Cancer No family hx of      Prostate Cancer No family hx of        SOCIAL HISTORY:   Social History     Socioeconomic History     Marital status: Single     Spouse name: Not on file     Number of children: Not on file     Years of  education: Not on file     Highest education level: Not on file   Occupational History     Not on file   Tobacco Use     Smoking status: Not on file     Smokeless tobacco: Not on file   Substance and Sexual Activity     Alcohol use: Not on file     Drug use: Not on file     Sexual activity: Not on file   Other Topics Concern     Not on file   Social History Narrative     Not on file     Social Determinants of Health     Financial Resource Strain: Not on file   Food Insecurity: Not on file   Transportation Needs: Not on file   Physical Activity: Not on file   Stress: Not on file   Social Connections: Not on file   Intimate Partner Violence: Not on file   Housing Stability: Not on file       VITALS:  /82   Pulse 81   Temp 97.7  F (36.5  C) (Oral)   Resp 14   Wt 70.3 kg (155 lb)   SpO2 97%   BMI 21.02 kg/m      PHYSICAL EXAM:  Physical Exam  Vitals and nursing note reviewed.   Constitutional:       Appearance: Normal appearance.   HENT:      Head: Normocephalic and atraumatic.      Right Ear: External ear normal.      Left Ear: External ear normal.      Nose: Nose normal.      Mouth/Throat:      Mouth: Mucous membranes are moist.   Eyes:      Extraocular Movements: Extraocular movements intact.      Conjunctiva/sclera: Conjunctivae normal.      Pupils: Pupils are equal, round, and reactive to light.   Cardiovascular:      Rate and Rhythm: Normal rate and regular rhythm.   Pulmonary:      Effort: Pulmonary effort is normal.      Breath sounds: Normal breath sounds. No wheezing or rales.   Abdominal:      General: Abdomen is flat. There is no distension.      Palpations: Abdomen is soft.      Tenderness: There is no guarding.      Comments: Epigastric and LUQ tenderness.    Musculoskeletal:         General: Normal range of motion.      Cervical back: Normal range of motion and neck supple.      Right lower leg: No edema.      Left lower leg: No edema.   Lymphadenopathy:      Cervical: No cervical adenopathy.    Skin:     General: Skin is warm and dry.   Neurological:      General: No focal deficit present.      Mental Status: He is alert and oriented to person, place, and time. Mental status is at baseline.      Comments: No gross focal neurologic deficits   Psychiatric:         Mood and Affect: Mood normal.         Behavior: Behavior normal.         Thought Content: Thought content normal.          LAB:  All pertinent labs reviewed and interpreted.  Results for orders placed or performed during the hospital encounter of 03/09/22   Comprehensive metabolic panel   Result Value Ref Range    Sodium 140 136 - 145 mmol/L    Potassium 3.8 3.5 - 5.0 mmol/L    Chloride 102 98 - 107 mmol/L    Carbon Dioxide (CO2) 27 22 - 31 mmol/L    Anion Gap 11 5 - 18 mmol/L    Urea Nitrogen 12 8 - 22 mg/dL    Creatinine 0.86 0.70 - 1.30 mg/dL    Calcium 9.6 8.5 - 10.5 mg/dL    Glucose 115 70 - 125 mg/dL    Alkaline Phosphatase 114 45 - 120 U/L    AST 17 0 - 40 U/L    ALT 15 0 - 45 U/L    Protein Total 7.2 6.0 - 8.0 g/dL    Albumin 3.8 3.5 - 5.0 g/dL    Bilirubin Total 0.3 0.0 - 1.0 mg/dL    GFR Estimate >90 >60 mL/min/1.73m2   Result Value Ref Range    Lipase 17 0 - 52 U/L   Result Value Ref Range    Troponin I <0.01 0.00 - 0.29 ng/mL   CBC with platelets and differential   Result Value Ref Range    WBC Count 5.7 4.0 - 11.0 10e3/uL    RBC Count 4.96 4.40 - 5.90 10e6/uL    Hemoglobin 14.9 13.3 - 17.7 g/dL    Hematocrit 44.9 40.0 - 53.0 %    MCV 91 78 - 100 fL    MCH 30.0 26.5 - 33.0 pg    MCHC 33.2 31.5 - 36.5 g/dL    RDW 12.4 10.0 - 15.0 %    Platelet Count 194 150 - 450 10e3/uL    % Neutrophils 52 %    % Lymphocytes 35 %    % Monocytes 7 %    % Eosinophils 4 %    % Basophils 1 %    % Immature Granulocytes 1 %    NRBCs per 100 WBC 0 <1 /100    Absolute Neutrophils 3.0 1.6 - 8.3 10e3/uL    Absolute Lymphocytes 2.0 0.8 - 5.3 10e3/uL    Absolute Monocytes 0.4 0.0 - 1.3 10e3/uL    Absolute Eosinophils 0.2 0.0 - 0.7 10e3/uL    Absolute Basophils  0.0 0.0 - 0.2 10e3/uL    Absolute Immature Granulocytes 0.0 <=0.4 10e3/uL    Absolute NRBCs 0.0 10e3/uL   Extra Blue Top Tube   Result Value Ref Range    Hold Specimen JIC    Extra Red Top Tube   Result Value Ref Range    Hold Specimen JIC    Extra Green Top (Lithium Heparin) Tube   Result Value Ref Range    Hold Specimen JIC    Extra Purple Top Tube   Result Value Ref Range    Hold Specimen JIC        RADIOLOGY:  Reviewed all pertinent imaging. Please see official radiology report.  No orders to display     EKG:    Performed at: 8:07 AM  Impression: Normal EKG  Rate: 90  Rhythm: Sinus  Axis: Normal   ND Interval: 136  QRS Interval: 88  QTc Interval: 459  ST Changes: No acute ischemic changes  Comparison: October 2020, no acute changes    I have independently reviewed and interpreted the EKG(s) documented above.    I, Emeka Desouza, am serving as a scribe to document services personally performed by Dr. Harrison based on my observation and the provider's statements to me. I, Brown Harrison MD attest that Emeka Desouza is acting in a scribe capacity, has observed my performance of the services and has documented them in accordance with my direction.    Brown Harrison M.D.  Emergency Medicine  [facility] EMERGENCY DEPARTMENT  [address] [facility] [id number] [phone]  Dept: [phone]     Brown Harrison MD  03/10/22 0705

## 2022-03-09 NOTE — DISCHARGE INSTRUCTIONS
You were seen in the ED this morning for chest pain, abdominal pain, and vomiting. We ruled out serious heart-related causes for your pain. I anticipate this is a worsening of your chronic abdominal pain. Please follow up with your GI doctor and your surgeon. Try the zofran and carafate.

## 2022-03-15 ENCOUNTER — HOSPITAL ENCOUNTER (EMERGENCY)
Facility: HOSPITAL | Age: 37
Discharge: HOME | End: 2022-03-16
Attending: EMERGENCY MEDICINE | Admitting: EMERGENCY MEDICINE
Payer: COMMERCIAL

## 2022-03-15 VITALS
OXYGEN SATURATION: 100 % | RESPIRATION RATE: 17 BRPM | BODY MASS INDEX: 18.99 KG/M2 | SYSTOLIC BLOOD PRESSURE: 132 MMHG | WEIGHT: 140 LBS | HEART RATE: 62 BPM | TEMPERATURE: 97.8 F | DIASTOLIC BLOOD PRESSURE: 88 MMHG

## 2022-03-15 DIAGNOSIS — R11.2 NAUSEA AND VOMITING IN ADULT: ICD-10-CM

## 2022-03-15 DIAGNOSIS — R10.13 EPIGASTRIC PAIN: ICD-10-CM

## 2022-03-15 LAB
ALBUMIN SERPL-MCNC: 4.1 G/DL (ref 3.5–5)
ALP SERPL-CCNC: 103 U/L (ref 45–120)
ALT SERPL W P-5'-P-CCNC: 14 U/L (ref 0–45)
ANION GAP SERPL CALCULATED.3IONS-SCNC: 13 MMOL/L (ref 5–18)
AST SERPL W P-5'-P-CCNC: 14 U/L (ref 0–40)
ATRIAL RATE - MUSE: 90 BPM
BILIRUB DIRECT SERPL-MCNC: <0.1 MG/DL
BILIRUB SERPL-MCNC: 0.3 MG/DL (ref 0–1)
BUN SERPL-MCNC: 19 MG/DL (ref 8–22)
CALCIUM SERPL-MCNC: 8.7 MG/DL (ref 8.5–10.5)
CHLORIDE BLD-SCNC: 105 MMOL/L (ref 98–107)
CO2 SERPL-SCNC: 24 MMOL/L (ref 22–31)
CREAT SERPL-MCNC: 0.8 MG/DL (ref 0.7–1.3)
DIASTOLIC BLOOD PRESSURE - MUSE: NORMAL MMHG
ERYTHROCYTE [DISTWIDTH] IN BLOOD BY AUTOMATED COUNT: 12.4 % (ref 10–15)
GFR SERPL CREATININE-BSD FRML MDRD: >90 ML/MIN/1.73M2
GLUCOSE BLD-MCNC: 89 MG/DL (ref 70–125)
HCT VFR BLD AUTO: 43.3 % (ref 40–53)
HGB BLD-MCNC: 14.7 G/DL (ref 13.3–17.7)
HOLD SPECIMEN: NORMAL
INTERPRETATION ECG - MUSE: NORMAL
LIPASE SERPL-CCNC: 42 U/L (ref 0–52)
MAGNESIUM SERPL-MCNC: 2.6 MG/DL (ref 1.8–2.6)
MCH RBC QN AUTO: 30.3 PG (ref 26.5–33)
MCHC RBC AUTO-ENTMCNC: 33.9 G/DL (ref 31.5–36.5)
MCV RBC AUTO: 89 FL (ref 78–100)
P AXIS - MUSE: 84 DEGREES
PLATELET # BLD AUTO: 223 10E3/UL (ref 150–450)
POTASSIUM BLD-SCNC: 3.6 MMOL/L (ref 3.5–5)
PR INTERVAL - MUSE: 136 MS
PROT SERPL-MCNC: 7.3 G/DL (ref 6–8)
QRS DURATION - MUSE: 88 MS
QT - MUSE: 376 MS
QTC - MUSE: 459 MS
R AXIS - MUSE: 83 DEGREES
RBC # BLD AUTO: 4.85 10E6/UL (ref 4.4–5.9)
SODIUM SERPL-SCNC: 142 MMOL/L (ref 136–145)
SYSTOLIC BLOOD PRESSURE - MUSE: NORMAL MMHG
T AXIS - MUSE: 82 DEGREES
VENTRICULAR RATE- MUSE: 90 BPM
WBC # BLD AUTO: 7 10E3/UL (ref 4–11)

## 2022-03-15 PROCEDURE — 99284 EMERGENCY DEPT VISIT MOD MDM: CPT | Mod: 25

## 2022-03-15 PROCEDURE — 85027 COMPLETE CBC AUTOMATED: CPT | Performed by: EMERGENCY MEDICINE

## 2022-03-15 PROCEDURE — 82248 BILIRUBIN DIRECT: CPT | Performed by: EMERGENCY MEDICINE

## 2022-03-15 PROCEDURE — 258N000003 HC RX IP 258 OP 636: Performed by: EMERGENCY MEDICINE

## 2022-03-15 PROCEDURE — 80053 COMPREHEN METABOLIC PANEL: CPT | Performed by: EMERGENCY MEDICINE

## 2022-03-15 PROCEDURE — 96374 THER/PROPH/DIAG INJ IV PUSH: CPT

## 2022-03-15 PROCEDURE — C9113 INJ PANTOPRAZOLE SODIUM, VIA: HCPCS | Performed by: EMERGENCY MEDICINE

## 2022-03-15 PROCEDURE — 83735 ASSAY OF MAGNESIUM: CPT | Performed by: EMERGENCY MEDICINE

## 2022-03-15 PROCEDURE — 36415 COLL VENOUS BLD VENIPUNCTURE: CPT | Performed by: EMERGENCY MEDICINE

## 2022-03-15 PROCEDURE — 250N000013 HC RX MED GY IP 250 OP 250 PS 637: Performed by: EMERGENCY MEDICINE

## 2022-03-15 PROCEDURE — 250N000009 HC RX 250: Performed by: EMERGENCY MEDICINE

## 2022-03-15 PROCEDURE — 250N000011 HC RX IP 250 OP 636: Performed by: EMERGENCY MEDICINE

## 2022-03-15 PROCEDURE — 83690 ASSAY OF LIPASE: CPT | Performed by: EMERGENCY MEDICINE

## 2022-03-15 PROCEDURE — 96361 HYDRATE IV INFUSION ADD-ON: CPT

## 2022-03-15 RX ORDER — SUCRALFATE ORAL 1 G/10ML
1 SUSPENSION ORAL ONCE
Status: COMPLETED | OUTPATIENT
Start: 2022-03-15 | End: 2022-03-15

## 2022-03-15 RX ORDER — METHADONE HYDROCHLORIDE 10 MG/ML
160 CONCENTRATE ORAL ONCE
Status: COMPLETED | OUTPATIENT
Start: 2022-03-15 | End: 2022-03-15

## 2022-03-15 RX ADMIN — SUCRALFATE 1 G: 1 SUSPENSION ORAL at 21:04

## 2022-03-15 RX ADMIN — LIDOCAINE HYDROCHLORIDE 30 ML: 20 SOLUTION ORAL; TOPICAL at 20:25

## 2022-03-15 RX ADMIN — METHADONE HYDROCHLORIDE 160 MG: 10 CONCENTRATE ORAL at 23:30

## 2022-03-15 RX ADMIN — PANTOPRAZOLE SODIUM 40 MG: 40 INJECTION, POWDER, FOR SOLUTION INTRAVENOUS at 20:25

## 2022-03-15 RX ADMIN — SODIUM CHLORIDE 1000 ML: 9 INJECTION, SOLUTION INTRAVENOUS at 20:21

## 2022-03-15 ASSESSMENT — ENCOUNTER SYMPTOMS
SHORTNESS OF BREATH: 0
NAUSEA: 1
ABDOMINAL PAIN: 1
CHILLS: 0
VOMITING: 1
DIARRHEA: 1
FEVER: 0

## 2022-03-16 NOTE — DISCHARGE INSTRUCTIONS
You were seen in the Emergency Department today for abdominal cramping, nausea, and vomiting.      You should continue to take all of your medications as prescribed and follow-up in the endoscopy clinic as planned.  Go to the methadone clinic on Thursday for your usual dose.      Please return to the ER if you experience inability to keep food/fluids down, fever, vomiting blood, uncontrolled pain,, and/or for any other new or concerning symptoms, otherwise please follow up with your primary doctor and the GI clinic for recheck.     Below is some information you might find useful.     Thank you for choosing [facility]. It was a pleasure taking care of you today!  - Dr. Lashay Warner

## 2022-03-16 NOTE — ED TRIAGE NOTES
Patient states hx of ulcers.  Reports increased abdominal pain over the weekend.  Patient has been seen multiple times for same pain- has planned endoscopy.  Had follow up with PCP on Friday and had labs drawn.  C/o nausea & vomiting x 6.  Reports taking antacids and zofran at home without relief of symptoms.

## 2022-03-16 NOTE — ED PROVIDER NOTES
EMERGENCY DEPARTMENT ENCOUNTER      NAME: Matt Ramirez  YOB: 1985  MRN: [id number]      FINAL IMPRESSION  1. Epigastric pain    2. Nausea and vomiting in adult        MEDICAL DECISION MAKING   Pertinent Labs & Imaging studies reviewed. (See chart for details)    Matt Ramirez is a 36 year old male who presents for evaluation of upper abdominal pain. Records reviewed. Patient has a history of PUD and opioid dependence, on methadone. He has been seen in the ED several times with nearly identical complaints. He has been following with GI and has an endoscopy scheduled for the end of the month. He was most recently seen here on on 3/9 and ultimately discharged. Today, he returns with epigastric pain, nausea and vomiting. He feels his current symptoms are identical to what he experienced prior to two previous ED visits. He was feeling better when he left but had recurrence 2 days ago. He believes his symptoms are exacerbated by the fact that he has not been able to keep his methadone down. No other new or associated complaints. Vitals on arrival stable. Remainder of history and exam, as below.     MD in triage ordered basic labs, fluids, zofran, GI cocktail, and protonix. These were all reassuring. I reviewed results with the patient and he reported feeling better, as he did when he was here the last 2 times. I have low suspicion for AAA, acute pancreatitis, perforation, or any surgical/infectious process requiring advanced imaging. Patient agrees. He would like to take his methadone and see if he is able to keep that down. If so, he feels comfortable going home and continuing to take his medications and follow up with GI team as planned.     Patient was given his usual methadone dose. On re-assessment, he reported feeling much improved and has been able to keep both the methadone and a glass of water down.  Patient was reassured by results today and eager to go home.  He plans to follow-up with GI  as previously planned.  I encouraged him to call the clinic to see if he can get on a waiting list, which he also felt comfortable doing.  In the meantime, he will continue to take all of his medications as prescribed.    The importance of close follow up was discussed. We reviewed warning signs and symptoms, and I instructed Mr. Ramirez to return to the emergency department immediately if he develops any new or worsening symptoms. I provided additional verbal discharge instructions. Mr. Ramirez expressed understanding and agreement with this plan of care, his questions were answered, and he was discharged in stable condition.       ED COURSE  9:18 PM I met with the patient, obtained history, performed an initial exam, and discussed options and plan for diagnostics and treatment here in the ED. PPE worn including surgical mask, surgical gloves.  11:41 PM I rechecked the patient and updated him on labs. Nursing reports the patient only recently received his methadone.  12:02 AM I rechecked the patient who reports he is feeling better and is comfortable with discharge.       MEDICATIONS GIVEN IN THE ED  Medications   0.9% sodium chloride BOLUS (0 mLs Intravenous Stopped 3/15/22 2104)   pantoprazole (PROTONIX) IV push injection 40 mg (40 mg Intravenous Given 3/15/22 2025)   lidocaine (viscous) (XYLOCAINE) 2 % 15 mL, alum & mag hydroxide-simethicone (MAALOX) 15 mL GI Cocktail (30 mLs Oral Given 3/15/22 2025)   sucralfate (CARAFATE) suspension 1 g (1 g Oral Given 3/15/22 2104)   methadone (DOLOPHINE-INTENSOL) 10 MG/ML (HIGH CONC) solution 160 mg (160 mg Oral Given 3/15/22 2330)       NEW PRESCRIPTIONS STARTED AT TODAY'S VISIT  Discharge Medication List as of 3/16/2022 12:04 AM             =================================================================    Chief Complaint   Patient presents with     Abdominal Pain         HPI:    Patient information was obtained from: Patient    Use of : N/RAFAELA DELONG  "James is a 36 year old male with a pertinent medical history of peptic ulcer disease, GERD, duodenal stenosis, s/p partial gastrectomy, methadone use, who presents by walk in for the evaluation of abdominal pain, vomiting. Patient was seen at this ED on 03/09 and is having similar symptoms of epigastric abdominal pain, nausea, vomiting. Prior symptoms resolved on 03/09, but symptoms returned on Sunday night  (03/13, 2 days ago). Patient has been able to tolerate fluids well, but states that when he eats, he vomits up food soon afterwards. Patient is currently on methadone, but has been unable to tolerate his medications so he is currently having withdrawal symptoms. He states these withdrawal symptoms make his current symptoms worse.    Patient notes associated diarrhea which started 2 days ago, but none today. He also reports he has been having cloudy urine which he was having when he was evaluated last week.    He states that since symptoms onset he has been unable to tolerate his medications and is requesting a dose of methadone. He is also requesting to be watched for 30 minutes after taking his methadone to see if he is able to tolerate it. Patient states he had an endoscopy in the past which found a \"baseball size\" gastric ulcer which perforated and was having hematemesis at that time. Denies any current hematemesis.    Patient reports he has seen his PCP and GI and they were able to get him an endoscopy next month. He denies fever, chills. Patient had chest pain and shortness of breath when he was evaluated last week, but none current.    Per chart review, patient was seen at this ED on 03/09/2022 for the evaluation of abdominal pain. Patient initially presented with nausea, vomiting, epigastric pain. Vomiting x1 every hour at that time and unable to tolerate food, fluids, or his medications. CMP, lipase, CBC unremarkable. Patient was treated with fluids, Tylenol, a GI cocktail, and given a dose of " methadone. Patient discharged in good condition.    Prior to this visit the patient was seen on 02/28/2022 with a negative work up at that time.    RELEVANT HISTORY, MEDICATIONS, & ALLERGIES   Past medical history, surgical history, family history, medications, and allergies reviewed and pertinent noted in HPI. See end of note for comprehensive list.    REVIEW OF SYSTEMS:  Review of Systems   Constitutional: Negative for chills and fever.   Respiratory: Negative for shortness of breath.    Cardiovascular: Negative for chest pain.   Gastrointestinal: Positive for abdominal pain, diarrhea, nausea and vomiting.   Genitourinary:        Positive for cloudy urine.       PHYSICAL EXAM:    Vitals: /88   Pulse 62   Temp 97.8  F (36.6  C) (Temporal)   Resp 17   Wt 63.5 kg (140 lb)   SpO2 100%   BMI 18.99 kg/m     General: Alert and interactive, comfortable appearing.  HENT: Oropharynx without erythema or exudates. MMM.   Eyes: Pupils mid-sized and equally reactive.   Neck: Full AROM.   Cardiovascular: Regular rate and rhythm. Peripheral pulses 2+ bilaterally.  Chest/Pulmonary: Normal work of breathing. Lung sounds clear and equal throughout, no wheezes or crackles. No chest wall tenderness or deformities.  Abdomen: Mild tenderness to epigastric area, no rebound or guarding.   Back/Spine: No CVA or midline tenderness.  Extremities: Normal ROM of all major joints. No lower extremity edema.   Skin: No diaphoresis. Warm and dry. Normal skin color.  Neuro: No tremor. Speech clear. CNs grossly intact. Moves all extremities appropriately. Strength and sensation grossly intact to all extremities.   Psych: Normal affect/mood, cooperative, memory appropriate.     LAB  Labs Ordered and Resulted from Time of ED Arrival to Time of ED Departure   CBC WITH PLATELETS - Normal       Result Value    WBC Count 7.0      RBC Count 4.85      Hemoglobin 14.7      Hematocrit 43.3      MCV 89      MCH 30.3      MCHC 33.9      RDW 12.4       Platelet Count 223     BASIC METABOLIC PANEL - Normal    Sodium 142      Potassium 3.6      Chloride 105      Carbon Dioxide (CO2) 24      Anion Gap 13      Urea Nitrogen 19      Creatinine 0.80      Calcium 8.7      Glucose 89      GFR Estimate >90     HEPATIC FUNCTION PANEL - Normal    Bilirubin Total 0.3      Bilirubin Direct <0.1      Protein Total 7.3      Albumin 4.1      Alkaline Phosphatase 103      AST 14      ALT 14     LIPASE - Normal    Lipase 42     MAGNESIUM - Normal    Magnesium 2.6         Comprehensive outline of EPIC chart Hx  PAST MEDICAL HISTORY    Past Medical History:   Diagnosis Date     Duodenal stenosis      Gastroesophageal reflux disease      Generalized anxiety disorder with panic attacks      Methadone use      Microcytic anemia      Peptic ulcer disease      Past Surgical History:   Procedure Laterality Date     PARTIAL GASTRECTOMY       TENDON REPAIR      left pinky finger       CURRENT MEDICATIONS    Current Outpatient Medications   Medication Instructions     methadone (DOLOPHINE-INTENSOL) 160 mg, Oral, DAILY     ondansetron (ZOFRAN-ODT) 4 mg, Oral, EVERY 6 HOURS PRN       ALLERGIES    No Known Allergies    FAMILY HISTORY    Family History   Problem Relation Age of Onset     Heart Disease Mother      Hypertension Mother      Hyperlipidemia Mother      Anesthesia Reaction Mother         behavioral changes, no personal reaction     Diabetes Father      Hypertension Father      Hyperlipidemia Father      LUNG DISEASE Father      No Known Problems Brother      Diabetes Maternal Grandmother      Cerebrovascular Disease Maternal Grandmother      Deep Vein Thrombosis Maternal Grandmother      No Known Problems Brother      No Known Problems Brother      Colon Cancer No family hx of      Prostate Cancer No family hx of        SOCIAL HISTORY    Social History     Socioeconomic History     Marital status:      Spouse name: Not on file     Number of children: Not on file     Years of  education: Not on file     Highest education level: Not on file   Occupational History     Not on file   Tobacco Use     Smoking status: Former Smoker     Packs/day: 1.00     Quit date: 2019     Years since quitting: 3.2     Smokeless tobacco: Never Used   Substance and Sexual Activity     Alcohol use: Not Currently     Drug use: Yes     Comment: Methadone     Sexual activity: Not on file   Other Topics Concern     Not on file   Social History Narrative     Not on file     Social Determinants of Health     Financial Resource Strain: Not on file   Food Insecurity: Not on file   Transportation Needs: Not on file   Physical Activity: Not on file   Stress: Not on file   Social Connections: Not on file   Intimate Partner Violence: Not on file   Housing Stability: Not on file       I, Reji Angelo, am serving as a scribe to document services personally performed by Dr. Lashay Warner based on my observation and the provider's statements to me. I, Lashay Warner MD attest that Reji Angelo is acting in a scribe capacity, has observed my performance of the services and has documented them in accordance with my direction.    Lashay Warner M.D.  Emergency Medicine  [facility] EMERGENCY DEPARTMENT  [address] [place] [id number]  [phone]  Dept: [phone]     Lashay Warner MD  03/16/22 8504

## 2022-04-06 ENCOUNTER — HOSPITAL ENCOUNTER (EMERGENCY)
Facility: HOSPITAL | Age: 37
Discharge: HOME | End: 2022-04-06
Attending: EMERGENCY MEDICINE | Admitting: EMERGENCY MEDICINE
Payer: COMMERCIAL

## 2022-04-06 VITALS
SYSTOLIC BLOOD PRESSURE: 177 MMHG | DIASTOLIC BLOOD PRESSURE: 104 MMHG | TEMPERATURE: 98.6 F | RESPIRATION RATE: 18 BRPM | BODY MASS INDEX: 21.67 KG/M2 | OXYGEN SATURATION: 100 % | HEART RATE: 98 BPM | WEIGHT: 160 LBS | HEIGHT: 72 IN

## 2022-04-06 DIAGNOSIS — R10.84 ABDOMINAL PAIN, GENERALIZED: ICD-10-CM

## 2022-04-06 PROCEDURE — 99283 EMERGENCY DEPT VISIT LOW MDM: CPT

## 2022-04-06 PROCEDURE — 250N000013 HC RX MED GY IP 250 OP 250 PS 637: Performed by: EMERGENCY MEDICINE

## 2022-04-06 RX ORDER — METHADONE HYDROCHLORIDE 10 MG/ML
80 CONCENTRATE ORAL ONCE
Status: COMPLETED | OUTPATIENT
Start: 2022-04-06 | End: 2022-04-06

## 2022-04-06 RX ADMIN — METHADONE HYDROCHLORIDE 80 MG: 10 CONCENTRATE ORAL at 13:54

## 2022-04-06 ASSESSMENT — ENCOUNTER SYMPTOMS
ABDOMINAL PAIN: 0
FEVER: 0
SHORTNESS OF BREATH: 0

## 2022-04-06 NOTE — ED PROVIDER NOTES
"EMERGENCY DEPARTMENT ENCOUNTER       ED Course & Medical Decision Making       I saw and examined the patient.  He has no active medical complaints, no recent illnesses or injuries.  He is here for opiate withdrawal stating that he vomited up his doses of maintenance methadone.  Given that he has no other substance that he is complaining of withdrawal from I do not suspect that any work-up is indicated.  I spoke to pharmacy who contacted his methadone clinic and we did verify dosing and have ordered him a half dose for today.  I did indicate that this is not an appropriate use of the emergency department and he needs to find a long-term solution to get off of opiates completely.  He is in agreement with this and I have given resources through the care manager for him to do this.  He has follow-up with his methadone clinic tomorrow.  Otherwise there is no other complaints so he will be discharged.          Prior to making a final disposition on this patient the results of patient's tests and other diagnostic studies were discussed with the patient. All questions were answered. Patient expressed understanding of the plan and was amenable to it.    Medications   methadone (DOLOPHINE-INTENSOL) 10 MG/ML (HIGH CONC) solution 80 mg (has no administration in time range)       Final Impression     1. Abdominal pain, generalized            Chief Complaint     Chief Complaint   Patient presents with     Withdrawal       Pt was recently admitted vomiting - unknown cause(endocscopy scheduled in a few months - notes hx of ulcer), improved for a while but started up again 3 days ago.     Pt unable to keep down methadone with the vomiting. Not able to get more methadone until tomorrow. Pt is restless, anxious, diaphoretic, \"feels like heart is going to explode\". Per pt hx of seizure history from methadone withdrawal.     [facility] managing methadone.     Purely methadone withdrawal per patient - does not do other street " drugs, denies alcohol use.       HPI       Matt Ramirez is a 36 year old male who presents for evaluation of opiate withdrawal.  He has been on chronic methadone through methadone clinic and he ran out 1 day early.  He states that it is because he has been vomiting up his regular dose and has not been ingesting it.      Past Medical History     Past Medical History:   Diagnosis Date     Duodenal stenosis      Gastroesophageal reflux disease      Generalized anxiety disorder with panic attacks      Methadone use      Microcytic anemia      Peptic ulcer disease      Past Surgical History:   Procedure Laterality Date     PARTIAL GASTRECTOMY       TENDON REPAIR      left pinky finger     Family History   Problem Relation Age of Onset     Heart Disease Mother      Hypertension Mother      Hyperlipidemia Mother      Anesthesia Reaction Mother         behavioral changes, no personal reaction     Diabetes Father      Hypertension Father      Hyperlipidemia Father      LUNG DISEASE Father      No Known Problems Brother      Diabetes Maternal Grandmother      Cerebrovascular Disease Maternal Grandmother      Deep Vein Thrombosis Maternal Grandmother      No Known Problems Brother      No Known Problems Brother      Colon Cancer No family hx of      Prostate Cancer No family hx of       Social History     Tobacco Use     Smoking status: Former Smoker     Packs/day: 1.00     Quit date: 2019     Years since quitting: 3.2     Smokeless tobacco: Never Used   Substance Use Topics     Alcohol use: Not Currently     Drug use: Yes     Comment: Methadone       Relevant past medical, surgical, family and social history as documented above, has been reviewed and discussed with patient. No changes or additions, unless otherwise noted in the HPI.    Current Medications     methadone (DOLOPHINE-INTENSOL) 10 MG/ML (HIGH CONC) solution  ondansetron (ZOFRAN-ODT) 4 MG ODT tab        Allergies     No Known Allergies    Review of Systems      Review of Systems   Constitutional: Negative for fever.   Respiratory: Negative for shortness of breath.    Cardiovascular: Negative for chest pain.   Gastrointestinal: Negative for abdominal pain.   All other systems reviewed and are negative.       Remainder of systems reviewed, unless noted in HPI all others negative.    Physical Exam     BP (!) 177/104   Pulse 98   Temp 98.6  F (37  C) (Oral)   Resp 18   Ht 1.829 m (6')   Wt 72.6 kg (160 lb)   SpO2 100%   BMI 21.70 kg/m      Physical Exam  Vitals and nursing note reviewed.   Constitutional:       General: He is not in acute distress.  HENT:      Head: Normocephalic.      Nose: Nose normal.   Eyes:      General: No scleral icterus.  Cardiovascular:      Rate and Rhythm: Normal rate.   Pulmonary:      Effort: Pulmonary effort is normal.   Musculoskeletal:      Cervical back: Neck supple.   Skin:     Findings: No rash.   Neurological:      Mental Status: He is alert. Mental status is at baseline.   Psychiatric:         Mood and Affect: Mood normal.             Labs & Imaging         Labs Ordered and Resulted from Time of ED Arrival to Time of ED Departure - No data to display           Matt Evans MD  Emergency Medicine  [facility] EMERGENCY DEPARTMENT  [address] [place] [id number]  [phone]  4/6/2022         Matt Evans MD  04/06/22 8322

## 2022-04-06 NOTE — ED TRIAGE NOTES
"Pt was recently admitted vomiting - unknown cause(endocscopy scheduled in a few months - notes hx of ulcer), improved for a while but started up again 3 days ago.     Pt unable to keep down methadone with the vomiting. Not able to get more methadone until tomorrow. Pt is restless, anxious, diaphoretic, \"feels like heart is going to explode\". Per pt hx of seizure history from methadone withdrawal.     [facility].     Purely methadone withdrawal per patient - does not do other street drugs, denies alcohol use.   "

## 2022-04-06 NOTE — ED NOTES
Pt called in lobby to recheck vital signs and symptoms; pt and family no longer seen in lobby; message left via phone call at number listed on contact info.

## 2022-04-06 NOTE — PROGRESS NOTES
"Care Management Note:     Dr. Evans requested writer see patient and give him some phone numbers for assistance. Patient is currently being seen at an outpatient clinic for weaning off opioids. He is on methadone at the time, says he is feeling out of sorts. He has appointment with clinic tomorrow but feels he needs something sooner. \"The methadone is not helping\". Patient waiting now for some medication prior to leaving ED.     Writer consulted with ALMA BOYKIN in the hospital. Patient given list of different services/ facilities that provide Rule 25 assessments. List given to patient, also given list of treatment/ AA numbers. He appreciated the info. Appears very anxious and fidgeting.      Purnima Whelan RN, CM SHARON    "

## 2022-05-04 ENCOUNTER — HOSPITAL ENCOUNTER (EMERGENCY)
Facility: HOSPITAL | Age: 37
Discharge: LEFT BEFORE DISCHARGE | End: 2022-05-04
Attending: EMERGENCY MEDICINE | Admitting: EMERGENCY MEDICINE
Payer: COMMERCIAL

## 2022-05-04 VITALS
HEIGHT: 72 IN | WEIGHT: 160 LBS | BODY MASS INDEX: 21.67 KG/M2 | OXYGEN SATURATION: 98 % | HEART RATE: 111 BPM | DIASTOLIC BLOOD PRESSURE: 85 MMHG | TEMPERATURE: 98.8 F | SYSTOLIC BLOOD PRESSURE: 115 MMHG | RESPIRATION RATE: 27 BRPM

## 2022-05-04 DIAGNOSIS — R56.9 SEIZURE-LIKE ACTIVITY (H): ICD-10-CM

## 2022-05-04 LAB
ALBUMIN SERPL-MCNC: 4.2 G/DL (ref 3.5–5)
ALP SERPL-CCNC: 124 U/L (ref 45–120)
ALT SERPL W P-5'-P-CCNC: 24 U/L (ref 0–45)
AMPHETAMINES UR QL SCN: ABNORMAL
ANION GAP SERPL CALCULATED.3IONS-SCNC: 13 MMOL/L (ref 5–18)
AST SERPL W P-5'-P-CCNC: 22 U/L (ref 0–40)
BARBITURATES UR QL: ABNORMAL
BASOPHILS # BLD AUTO: 0.1 10E3/UL (ref 0–0.2)
BASOPHILS NFR BLD AUTO: 1 %
BENZODIAZ UR QL: ABNORMAL
BILIRUB SERPL-MCNC: 0.2 MG/DL (ref 0–1)
BUN SERPL-MCNC: 7 MG/DL (ref 8–22)
CALCIUM SERPL-MCNC: 9.4 MG/DL (ref 8.5–10.5)
CANNABINOIDS UR QL SCN: ABNORMAL
CHLORIDE BLD-SCNC: 104 MMOL/L (ref 98–107)
CO2 SERPL-SCNC: 23 MMOL/L (ref 22–31)
COCAINE UR QL: ABNORMAL
CREAT SERPL-MCNC: 0.82 MG/DL (ref 0.7–1.3)
CREAT UR-MCNC: 130 MG/DL
EOSINOPHIL # BLD AUTO: 0.1 10E3/UL (ref 0–0.7)
EOSINOPHIL NFR BLD AUTO: 1 %
ERYTHROCYTE [DISTWIDTH] IN BLOOD BY AUTOMATED COUNT: 12.1 % (ref 10–15)
GFR SERPL CREATININE-BSD FRML MDRD: >90 ML/MIN/1.73M2
GLUCOSE BLD-MCNC: 106 MG/DL (ref 70–125)
HCT VFR BLD AUTO: 42.3 % (ref 40–53)
HGB BLD-MCNC: 15 G/DL (ref 13.3–17.7)
HOLD SPECIMEN: NORMAL
IMM GRANULOCYTES # BLD: 0.1 10E3/UL
IMM GRANULOCYTES NFR BLD: 1 %
LACTATE SERPL-SCNC: 2.8 MMOL/L (ref 0.7–2)
LYMPHOCYTES # BLD AUTO: 1.4 10E3/UL (ref 0.8–5.3)
LYMPHOCYTES NFR BLD AUTO: 14 %
MAGNESIUM SERPL-MCNC: 2.6 MG/DL (ref 1.8–2.6)
MCH RBC QN AUTO: 30.3 PG (ref 26.5–33)
MCHC RBC AUTO-ENTMCNC: 35.5 G/DL (ref 31.5–36.5)
MCV RBC AUTO: 86 FL (ref 78–100)
METHADONE UR QL SCN: ABNORMAL
MONOCYTES # BLD AUTO: 0.6 10E3/UL (ref 0–1.3)
MONOCYTES NFR BLD AUTO: 6 %
NEUTROPHILS # BLD AUTO: 7.9 10E3/UL (ref 1.6–8.3)
NEUTROPHILS NFR BLD AUTO: 77 %
NRBC # BLD AUTO: 0 10E3/UL
NRBC BLD AUTO-RTO: 0 /100
OPIATES UR QL SCN: ABNORMAL
OXYCODONE UR QL: ABNORMAL
PCP UR QL SCN: ABNORMAL
PLATELET # BLD AUTO: 247 10E3/UL (ref 150–450)
POTASSIUM BLD-SCNC: 3.6 MMOL/L (ref 3.5–5)
PROT SERPL-MCNC: 7.7 G/DL (ref 6–8)
RBC # BLD AUTO: 4.95 10E6/UL (ref 4.4–5.9)
SODIUM SERPL-SCNC: 140 MMOL/L (ref 136–145)
WBC # BLD AUTO: 10 10E3/UL (ref 4–11)

## 2022-05-04 PROCEDURE — 85025 COMPLETE CBC W/AUTO DIFF WBC: CPT | Performed by: EMERGENCY MEDICINE

## 2022-05-04 PROCEDURE — 80307 DRUG TEST PRSMV CHEM ANLYZR: CPT | Performed by: EMERGENCY MEDICINE

## 2022-05-04 PROCEDURE — 93005 ELECTROCARDIOGRAM TRACING: CPT | Performed by: EMERGENCY MEDICINE

## 2022-05-04 PROCEDURE — 80053 COMPREHEN METABOLIC PANEL: CPT | Performed by: EMERGENCY MEDICINE

## 2022-05-04 PROCEDURE — 99285 EMERGENCY DEPT VISIT HI MDM: CPT | Mod: 25 | Performed by: EMERGENCY MEDICINE

## 2022-05-04 PROCEDURE — 250N000013 HC RX MED GY IP 250 OP 250 PS 637: Performed by: EMERGENCY MEDICINE

## 2022-05-04 PROCEDURE — 83735 ASSAY OF MAGNESIUM: CPT | Performed by: EMERGENCY MEDICINE

## 2022-05-04 PROCEDURE — 96374 THER/PROPH/DIAG INJ IV PUSH: CPT | Performed by: EMERGENCY MEDICINE

## 2022-05-04 PROCEDURE — 36415 COLL VENOUS BLD VENIPUNCTURE: CPT | Performed by: EMERGENCY MEDICINE

## 2022-05-04 PROCEDURE — 250N000011 HC RX IP 250 OP 636: Performed by: EMERGENCY MEDICINE

## 2022-05-04 PROCEDURE — 83605 ASSAY OF LACTIC ACID: CPT | Performed by: EMERGENCY MEDICINE

## 2022-05-04 RX ORDER — FENTANYL CITRATE 50 UG/ML
75 INJECTION, SOLUTION INTRAMUSCULAR; INTRAVENOUS ONCE
Status: DISCONTINUED | OUTPATIENT
Start: 2022-05-04 | End: 2022-05-04

## 2022-05-04 RX ORDER — LORAZEPAM 2 MG/ML
0.5 INJECTION INTRAMUSCULAR ONCE
Status: COMPLETED | OUTPATIENT
Start: 2022-05-04 | End: 2022-05-04

## 2022-05-04 RX ORDER — LORAZEPAM 2 MG/ML
INJECTION INTRAMUSCULAR
Status: DISCONTINUED
Start: 2022-05-04 | End: 2022-05-04 | Stop reason: WASHOUT

## 2022-05-04 RX ORDER — METHADONE HYDROCHLORIDE 10 MG/ML
100 CONCENTRATE ORAL ONCE
Status: COMPLETED | OUTPATIENT
Start: 2022-05-04 | End: 2022-05-04

## 2022-05-04 RX ADMIN — METHADONE HYDROCHLORIDE 100 MG: 10 CONCENTRATE ORAL at 23:10

## 2022-05-04 RX ADMIN — LORAZEPAM 0.5 MG: 2 INJECTION INTRAMUSCULAR; INTRAVENOUS at 21:05

## 2022-05-05 LAB
ATRIAL RATE - MUSE: 111 BPM
DIASTOLIC BLOOD PRESSURE - MUSE: NORMAL MMHG
INTERPRETATION ECG - MUSE: NORMAL
P AXIS - MUSE: 77 DEGREES
PR INTERVAL - MUSE: 120 MS
QRS DURATION - MUSE: 86 MS
QT - MUSE: 360 MS
QTC - MUSE: 489 MS
R AXIS - MUSE: 125 DEGREES
SYSTOLIC BLOOD PRESSURE - MUSE: NORMAL MMHG
T AXIS - MUSE: 50 DEGREES
VENTRICULAR RATE- MUSE: 111 BPM

## 2022-05-05 NOTE — ED PROVIDER NOTES
EMERGENCY DEPARTMENT NOTE     Name: Matt Ramirez    Age/Sex: 36 year old male   MRN: [id number]   Evaluation Date & Time:  5/4/2022  8:34 PM    PCP:    Aaron Hawley   ED Provider: Zhen Weiss D.O.       CHIEF COMPLAINT    Seizures (Methadone withdrawal; )       DIAGNOSIS & DISPOSITION     1. Seizure-like activity (H)      DISPOSITION: AMA    At the conclusion of the encounter I discussed the results of all of the tests and the disposition. The questions were answered. The patient or family acknowledged understanding and was agreeable with the care plan.    TOTAL CRITICAL CARE TIME (EXCLUDING PROCEDURES): Not applicable    PROCEDURES:   None    EMERGENCY DEPARTMENT COURSE/MEDICAL DECISION MAKING   8:43 PM I met with the patient to gather history and to perform my initial exam.  We discussed treatment options and the plan for care while in the Emergency Department.  10:16 PM Called to patient's room as patient had stiffness and tremors. He was able to respond to commands and answer questions appropriately after the shaking stopped after approximately 30 seconds.  11:13 PM Patient requesting to leave AMA.    Matt Ramirez is a 36 year old male with relevant past history of methadone use, anxiety, migraine without aura, and peptic ulcer disease who presents to the emergency department for evaluation of seizures. The patient reports a history of methadone withdrawal, and began to experience withdrawal around 0830 this morning. This evening he had several seizures. This is abnormal for him so his wife called EMS. Daily methadone dose is 170 mg, last dose at 0630 this morning.  Triage note reviewed:    Wife called EMS; [facility] brought pt in; pt on methadone for past 15 years; clean off opiates other than methadone; pt had seizure for 1.5 minutes with 30 second rest then another seizure of 1 minute; EMS gave 2 mg ativan, no more seizures after that   Triage Assessment     Row Name 05/04/22 2032        Triage Assessment (Adult)    Airway WDL WDL              Differential diagnosis gnosis considered included but not limited to epilepsy or nonelective form seizure-like activity, intracranial process including intracranial mass, CVA or ICH, cardiac arrhythmia including torsades, electrolyte derangement including hyponatremia    Vital signs:/85   Pulse 111   Temp 98.8  F (37.1  C) (Oral)   Resp 27   Ht 1.829 m (6')   Wt 72.6 kg (160 lb)   SpO2 98%   BMI 21.70 kg/m    Pertinent physical exam findings:  General: Alert normal mental status  HEENT: Head atraumatic, patient partially edentulous, cervical spine nontender  Cardiac: Regular rate and rhythm S1-S2 without murmur rub  Pulmonary: Lungs are clear to ascultation bilaterally with good breath sounds  Abdomen: Soft nontender, positive bowel sounds.  No organomegaly or mass  Neuro: Cranial nerves 2 through 12 are intact.  5 out of 5 motor strength upper and lower extremities.  Intact sensation to light touch and positional sense.  No pronator drift.  Normal cerebellar function with serial fingers and heel-to-shin.  Patient able to ambulate without ataxia  Diagnostic studies:  Imaging:  No orders to display      Lab:  Labs Ordered and Resulted from Time of ED Arrival to Time of ED Departure   COMPREHENSIVE METABOLIC PANEL - Abnormal       Result Value    Sodium 140      Potassium 3.6      Chloride 104      Carbon Dioxide (CO2) 23      Anion Gap 13      Urea Nitrogen 7 (*)     Creatinine 0.82      Calcium 9.4      Glucose 106      Alkaline Phosphatase 124 (*)     AST 22      ALT 24      Protein Total 7.7      Albumin 4.2      Bilirubin Total 0.2      GFR Estimate >90     LACTIC ACID WHOLE BLOOD - Abnormal    Lactic Acid 2.8 (*)    DRUGS OF ABUSE 1+ PANEL, URINE ([facility]) - Abnormal    Amphetamines Urine Screen Negative      Benzodiazepines Urine Screen Negative      Opiates Urine Screen Negative      PCP Urine Screen Negative      Cannabinoids Urine  Screen Negative      Barbiturates Urine Screen Negative      Cocaine Urine Screen Negative      Methadone Urine Screen Positive (*)     Oxycodone Urine Screen Negative      Creatinine Urine mg/dL 130     MAGNESIUM - Normal    Magnesium 2.6     CBC WITH PLATELETS AND DIFFERENTIAL    WBC Count 10.0      RBC Count 4.95      Hemoglobin 15.0      Hematocrit 42.3      MCV 86      MCH 30.3      MCHC 35.5      RDW 12.1      Platelet Count 247      % Neutrophils 77      % Lymphocytes 14      % Monocytes 6      % Eosinophils 1      % Basophils 1      % Immature Granulocytes 1      NRBCs per 100 WBC 0      Absolute Neutrophils 7.9      Absolute Lymphocytes 1.4      Absolute Monocytes 0.6      Absolute Eosinophils 0.1      Absolute Basophils 0.1      Absolute Immature Granulocytes 0.1      Absolute NRBCs 0.0        Interventions: IV Ativan, oral methadone  Medical decision making: Patient had several episodes while in the emergency department 2 of which I witnessed directly of a generalized stiffening and tremulousness of arms or legs without definitive tonic-clonic movement.  On the cardiac monitor there was no arrhythmia noted.  Patient would  immediately wake up when queried verbally and did not appear to be postictal possibly indicative of a non elliptiform  seizure-like activity..  Patient does report remote history of seizure disorders currently on no medications and lactic acid was mildly elevated and cannot exclude lelliptiform seizures and recommendation was for admission with neurology consultation.  Patient also reported headache and I wished to obtain CT to exclude acute intracranial process with reported prior history of CVA.  Patient himself feels that he has difficulty absorbing methadone with prior history of partial gastric resection secondary to peptic ulcer disease and had recently discussed with methadone provider twice daily dosing instead of daily dosing.  Patient was insistent on receiving dose of  methadone for possible withdrawal-like symptoms prior to going to CT.  Patient was initially treated with IV Ativan and then given oral methadone.  Shortly after receiving methadone the patient removed his IV and informed the nurses that he did not wish to have further evaluation.  I was able to talk to the patient in the hallway and he indicated that he wished to go out and smoke but indicated that he would return to the emergency department for completion of CT scan.  Patient indicated earlier that he did not wish to be admitted to the hospital and we had discussed follow-up with neurology as well as his primary care physician and pain clinic specialist and also discussed avoidance of any activities including driving that could be dangerous recurrent seizure-like activity.  I had discussed that discharge would be against our medical advice earlier and prepared a AMA form with the patient has not returned and did not complete the form.    ED INTERVENTIONS     Medications   LORazepam (ATIVAN) injection 0.5 mg (0.5 mg Intravenous Given 5/4/22 2105)   methadone (DOLOPHINE-INTENSOL) 10 MG/ML (HIGH CONC) solution 100 mg (100 mg Oral Given 5/4/22 2310)       DISCHARGE MEDICATIONS        Review of your medicines      UNREVIEWED medicines. Ask your doctor about these medicines      Dose / Directions   methadone 10 MG/ML (HIGH CONC) solution  Commonly known as: DOLOPHINE-INTENSOL      Dose: 170 mg  Take 170 mg by mouth daily  Refills: 0     ondansetron 4 MG ODT tab  Commonly known as: ZOFRAN ODT      Dose: 4 mg  Take 1 tablet (4 mg) by mouth every 6 hours as needed for nausea  Quantity: 10 tablet  Refills: 0              INFORMATION SOURCE AND LIMITATIONS    History/Exam limitations: None  Patient information was obtained from: Patient  Use of : N/A    HISTORY OF PRESENT ILLNESS   Matt Ramirez is a 36 year old year old male with a relevant past history of methadone use, anxiety, migraine without aura, and  peptic ulcer disease, who presents to this ED by EMS for evaluation of seizures.    Per EMS, the patient has been on methadone for the past 15 years, no other opiate use. The patient had two seizures, the first lasting 1.5 minutes followed by a rest period, and then a second lasting 1 minute. EMS administered 2 mg Ativan with no additional seizures after administration.    The patient reports he has a history of seizures form methadone withdrawal. He currently is prescribed 170 mg methadone per day. He took his dose this morning around 0630 and normally the methadone would kick in around 0730, but today the methadone did not kick in. He began to experience withdrawal around 0830. His wife called EMS this evening as she noticed the patient was not having his typical withdrawal symptoms. The patient reports he had three seizures while lying on the bed prior to EMS arrival. He did not fall and did not sustain any head trauma. The patient currently endorses headache and back pain.    The patient is currently prescribed methadone by [facility]. His methadone is prescribed on Thursdays, and he receives enough to get him through Wednesday.    Per chart review, the patient has been evaluated in this ED 4 times in the last 2.5 months, most recently 4/6/22, complaining of abdominal pain, nausea, and vomiting. He reported that he was unable to keep his prescribed methadone down due to the vomiting.      REVIEW OF SYSTEMS:   Constitutional: Negative for fever.   HENT: Negative for URI symptoms or sore throat.    Cardiac: Negative for  chest pain,palpitations, positive for possible syncope  Respiratory: Negative for cough and shortness of breath.    Gastrointestinal: Negative for abdominal pain, nausea, constipation, rectal bleeding or melena, vomiting, diarrhea.  Genitourinary: Negative for dysuria, flank pain and hematuria.   Musculoskeletal: Positive for back pain.   Skin: Negative for  rash  Neurological:  Negative for speech difficulty, unilateral weakness or imbalance with walking. Positive for seizure like activity and headache.  Hematological: Negative for adenopathy. Does not bruise/bleed easily.   Psychiatric/Behavioral: Negative for confusion.       PATIENT HISTORY     Past Medical History:   Diagnosis Date     Duodenal stenosis      Gastroesophageal reflux disease      Generalized anxiety disorder with panic attacks      Methadone use      Microcytic anemia      Peptic ulcer disease      Patient Active Problem List   Diagnosis     Peptic ulcer disease     Seasonal allergic rhinitis, unspecified trigger     Migraine without aura and without status migrainosus, not intractable     Generalized anxiety disorder with panic attacks     Night sweats     Current moderate episode of major depressive disorder, unspecified whether recurrent (H)     Methadone use     Loss of weight     Microcytic anemia     Duodenal stenosis     Past Surgical History:   Procedure Laterality Date     PARTIAL GASTRECTOMY       TENDON REPAIR      left pinky finger     Social Histrory  Smoking: Current everyday smoker  Alcohol Use: None  No Known Allergies      OUTPATIENT MEDICATIONS     Discharge Medication List as of 5/4/2022 11:25 PM         Vitals:    05/04/22 2145 05/04/22 2300 05/04/22 2310 05/04/22 2311   BP: 137/81  115/85 115/85   Pulse: 111 112 111    Resp:  27     Temp:       TempSrc:       SpO2: 96%  98%    Weight:       Height:           Physical Exam   Constitutional: Oriented to person, place, and time. Appears well-developed and well-nourished.   HEENT: Edentulous.   Head: Atraumatic.   Neck: Normal range of motion. Neck supple.   Cardiovascular: Normal rate, regular rhythm and normal heart sounds.    Pulmonary/Chest: Normal effort  and breath sounds normal.   Abdominal: Soft. Bowel sounds are normal.   Musculoskeletal: Normal range of motion.   Neurological: Alert and oriented to person, place, and time. Normal strength.No  sensory deficit. No cranial nerve deficit . While examining the patient he suddenly became stiff and tremulous, began making choking sounds and grabbing at his throat. This episode lasted less than a minute and then the patient immediately returned to baseline mental status and was able to answer questions appropriately.  Skin: Skin is warm and dry.   Psychiatric: Normal mood and affect. Behavior is normal. Thought content normal.       DIAGNOSTICS    LABORATORY FINDINGS (REVIEWED AND INTERPRETED):  Labs Ordered and Resulted from Time of ED Arrival to Time of ED Departure   COMPREHENSIVE METABOLIC PANEL - Abnormal       Result Value    Sodium 140      Potassium 3.6      Chloride 104      Carbon Dioxide (CO2) 23      Anion Gap 13      Urea Nitrogen 7 (*)     Creatinine 0.82      Calcium 9.4      Glucose 106      Alkaline Phosphatase 124 (*)     AST 22      ALT 24      Protein Total 7.7      Albumin 4.2      Bilirubin Total 0.2      GFR Estimate >90     LACTIC ACID WHOLE BLOOD - Abnormal    Lactic Acid 2.8 (*)    DRUGS OF ABUSE 1+ PANEL, URINE ([facility] ONLY) - Abnormal    Amphetamines Urine Screen Negative      Benzodiazepines Urine Screen Negative      Opiates Urine Screen Negative      PCP Urine Screen Negative      Cannabinoids Urine Screen Negative      Barbiturates Urine Screen Negative      Cocaine Urine Screen Negative      Methadone Urine Screen Positive (*)     Oxycodone Urine Screen Negative      Creatinine Urine mg/dL 130     MAGNESIUM - Normal    Magnesium 2.6     CBC WITH PLATELETS AND DIFFERENTIAL    WBC Count 10.0      RBC Count 4.95      Hemoglobin 15.0      Hematocrit 42.3      MCV 86      MCH 30.3      MCHC 35.5      RDW 12.1      Platelet Count 247      % Neutrophils 77      % Lymphocytes 14      % Monocytes 6      % Eosinophils 1      % Basophils 1      % Immature Granulocytes 1      NRBCs per 100 WBC 0      Absolute Neutrophils 7.9      Absolute Lymphocytes 1.4      Absolute Monocytes 0.6       Absolute Eosinophils 0.1      Absolute Basophils 0.1      Absolute Immature Granulocytes 0.1      Absolute NRBCs 0.0           IMAGING (REVIEWED AND INTERPRETED):  No orders to display           ECG (REVIEWED AND INTERPRETED):   ECG:   Patient refused    I, Zaire Alcantar, am serving as a scribe to document services personally performed by Zhen Weiss D.O., based on my observation and the provider s statements to me.    I, Zhen Weiss D.O., attest that Zaire Alcantar is acting in a scribe capacity, has observed my performance of the services and has documented them in accordance with my direction.    Zhen Weiss D.O.  EMERGENCY MEDICINE   05/04/22  [facility] EMERGENCY DEPARTMENT  [address] [place] [id number] [phone]  Dept: [phone]     Zhen Weiss DO  05/05/22 0116

## 2022-05-05 NOTE — DISCHARGE INSTRUCTIONS
Follow-up with the methadone clinic tomorrow to discuss adjustments in your dosage.  Also see your primary care physician in follow-up.  You have been given a referral to neurology for further evaluation of seizures or seizure-like activity.  Avoid any activities that could be dangerous if you were to have recurrent seizure including no driving until you are cleared by neurology.  If you have really current seizure-like symptoms return to the emergency department.

## 2022-05-05 NOTE — ED TRIAGE NOTES
Wife called EMS; [facility] brought pt in; pt on methadone for past 15 years; clean off opiates other than methadone; pt had seizure for 1.5 minutes with 30 second rest then another seizure of 1 minute; EMS gave 2 mg ativan, no more seizures after that   Triage Assessment     Row Name 05/04/22 2032       Triage Assessment (Adult)    Airway WDL WDL

## 2022-05-05 NOTE — ED NOTES
Pt walked out of the department to have a cigarette. Significant other still in room. Provider stopped patient on the way out to sign AMA paperwork. Pt said he will be coming back to the room.

## 2022-05-05 NOTE — ED NOTES
Pt has seizure lasting 30 seconds and woke up speaking clearly and able to discuss the situation with Dr Weiss upon awakening from seizure; immediately alert and oriented x4; talking clearly

## 2022-05-05 NOTE — ED NOTES
"Pt amb around room without difficulty. amb to BR without difficulty. Says he can't sit still. Wife at bedside. Pt rocking back and forth and states ativan aint going to do shit for me and if you want me to sit still I need methadone or fentanyl. Dr. Weiss notified of pt request. Pt states we need to call the pharmacy now so they can start getting a prescription of liquid methadone set up for him. Stating this is what they always do for him when he comes here. Pt amb out of ED with lighter and was stopped and shown back to room. Multiple profanities out of patients mouth. IV restarted and pt yelling \"fuck\". I'm fucking hot.  "

## 2022-05-05 NOTE — ED NOTES
Seizure pads placed and pt asking if that's for when he has a seizure so it's soft and he won't hurt himself

## 2022-05-05 NOTE — ED NOTES
Bed: JNED-02  Expected date: 5/4/22  Expected time: 8:03 PM  Means of arrival: Ambulance  Comments:  [facility] 36 M methadone overdose IM ativan given no IV access VSS.

## 2022-05-05 NOTE — ED NOTES
In room and patient had pulled IV out. States he wanted to go to the BR. And was putting shirt on. Steady on feet. Dressing applied to arm and blood cleaned off of floor. Pt amb without difficulty to BR

## 2022-05-05 NOTE — ED NOTES
"Staff went to the pts. room to do EKG. Pts. refused and told the staff to come back in 10-15 mins when he gets calmer.  \"I can't stay still for this EKG\" pts. Stated. RN was been notified.  "

## 2022-05-05 NOTE — ED NOTES
Pt tried to walk out, nurse brought pt back to room. Writer gave methadone and asked if the patient could sit still for the CT. Pt said he had other places to be, like work.

## 2022-05-05 NOTE — ED NOTES
Wife out of room screaming that pt is having a seizure. Pt appears to be seizing, shaking on the bed, asked pt to lift arm up and able to do that while seizing. when Dr. Weiss in room asks if patient is ok and pt has eyes closed and gives provider thumbs up and states I really need my methadone so these seizures don't continue to happen. Pt completely a/o and talking to MD without difficulty

## 2022-05-06 ENCOUNTER — APPOINTMENT (OUTPATIENT)
Dept: CT IMAGING | Facility: HOSPITAL | Age: 37
End: 2022-05-06
Attending: EMERGENCY MEDICINE
Payer: COMMERCIAL

## 2022-05-06 ENCOUNTER — HOSPITAL ENCOUNTER (EMERGENCY)
Facility: HOSPITAL | Age: 37
Discharge: HOME | End: 2022-05-06
Attending: EMERGENCY MEDICINE | Admitting: EMERGENCY MEDICINE
Payer: COMMERCIAL

## 2022-05-06 VITALS
WEIGHT: 160 LBS | HEART RATE: 72 BPM | DIASTOLIC BLOOD PRESSURE: 93 MMHG | TEMPERATURE: 98.4 F | BODY MASS INDEX: 21.67 KG/M2 | OXYGEN SATURATION: 97 % | HEIGHT: 72 IN | RESPIRATION RATE: 20 BRPM | SYSTOLIC BLOOD PRESSURE: 142 MMHG

## 2022-05-06 DIAGNOSIS — G40.909 SEIZURE DISORDER (H): ICD-10-CM

## 2022-05-06 LAB
ALBUMIN SERPL-MCNC: 4.3 G/DL (ref 3.5–5)
ALP SERPL-CCNC: 125 U/L (ref 45–120)
ALT SERPL W P-5'-P-CCNC: 23 U/L (ref 0–45)
ANION GAP SERPL CALCULATED.3IONS-SCNC: 12 MMOL/L (ref 5–18)
AST SERPL W P-5'-P-CCNC: 31 U/L (ref 0–40)
BASOPHILS # BLD AUTO: 0.1 10E3/UL (ref 0–0.2)
BASOPHILS NFR BLD AUTO: 1 %
BILIRUB DIRECT SERPL-MCNC: 0.1 MG/DL
BILIRUB SERPL-MCNC: 0.2 MG/DL (ref 0–1)
BUN SERPL-MCNC: 16 MG/DL (ref 8–22)
CALCIUM SERPL-MCNC: 9.5 MG/DL (ref 8.5–10.5)
CHLORIDE BLD-SCNC: 100 MMOL/L (ref 98–107)
CK SERPL-CCNC: 930 U/L (ref 30–190)
CO2 SERPL-SCNC: 27 MMOL/L (ref 22–31)
CREAT SERPL-MCNC: 0.89 MG/DL (ref 0.7–1.3)
EOSINOPHIL # BLD AUTO: 0 10E3/UL (ref 0–0.7)
EOSINOPHIL NFR BLD AUTO: 0 %
ERYTHROCYTE [DISTWIDTH] IN BLOOD BY AUTOMATED COUNT: 12.5 % (ref 10–15)
GFR SERPL CREATININE-BSD FRML MDRD: >90 ML/MIN/1.73M2
GLUCOSE BLD-MCNC: 101 MG/DL (ref 70–125)
HCT VFR BLD AUTO: 46.8 % (ref 40–53)
HGB BLD-MCNC: 15.9 G/DL (ref 13.3–17.7)
IMM GRANULOCYTES # BLD: 0.1 10E3/UL
IMM GRANULOCYTES NFR BLD: 1 %
LYMPHOCYTES # BLD AUTO: 2 10E3/UL (ref 0.8–5.3)
LYMPHOCYTES NFR BLD AUTO: 17 %
MAGNESIUM SERPL-MCNC: 2.5 MG/DL (ref 1.8–2.6)
MCH RBC QN AUTO: 29.8 PG (ref 26.5–33)
MCHC RBC AUTO-ENTMCNC: 34 G/DL (ref 31.5–36.5)
MCV RBC AUTO: 88 FL (ref 78–100)
MONOCYTES # BLD AUTO: 0.6 10E3/UL (ref 0–1.3)
MONOCYTES NFR BLD AUTO: 5 %
NEUTROPHILS # BLD AUTO: 9.2 10E3/UL (ref 1.6–8.3)
NEUTROPHILS NFR BLD AUTO: 76 %
NRBC # BLD AUTO: 0 10E3/UL
NRBC BLD AUTO-RTO: 0 /100
PLATELET # BLD AUTO: 274 10E3/UL (ref 150–450)
POTASSIUM BLD-SCNC: 3.8 MMOL/L (ref 3.5–5)
PROT SERPL-MCNC: 7.6 G/DL (ref 6–8)
RBC # BLD AUTO: 5.33 10E6/UL (ref 4.4–5.9)
SODIUM SERPL-SCNC: 139 MMOL/L (ref 136–145)
WBC # BLD AUTO: 12 10E3/UL (ref 4–11)

## 2022-05-06 PROCEDURE — 80053 COMPREHEN METABOLIC PANEL: CPT | Performed by: EMERGENCY MEDICINE

## 2022-05-06 PROCEDURE — 250N000011 HC RX IP 250 OP 636: Performed by: EMERGENCY MEDICINE

## 2022-05-06 PROCEDURE — 83735 ASSAY OF MAGNESIUM: CPT | Performed by: EMERGENCY MEDICINE

## 2022-05-06 PROCEDURE — 82248 BILIRUBIN DIRECT: CPT | Performed by: EMERGENCY MEDICINE

## 2022-05-06 PROCEDURE — 96361 HYDRATE IV INFUSION ADD-ON: CPT

## 2022-05-06 PROCEDURE — 82550 ASSAY OF CK (CPK): CPT | Performed by: EMERGENCY MEDICINE

## 2022-05-06 PROCEDURE — 70450 CT HEAD/BRAIN W/O DYE: CPT

## 2022-05-06 PROCEDURE — 93005 ELECTROCARDIOGRAM TRACING: CPT | Performed by: EMERGENCY MEDICINE

## 2022-05-06 PROCEDURE — 99285 EMERGENCY DEPT VISIT HI MDM: CPT | Mod: 25

## 2022-05-06 PROCEDURE — 85025 COMPLETE CBC W/AUTO DIFF WBC: CPT | Performed by: EMERGENCY MEDICINE

## 2022-05-06 PROCEDURE — 258N000003 HC RX IP 258 OP 636: Performed by: EMERGENCY MEDICINE

## 2022-05-06 PROCEDURE — 36415 COLL VENOUS BLD VENIPUNCTURE: CPT | Performed by: EMERGENCY MEDICINE

## 2022-05-06 PROCEDURE — 96365 THER/PROPH/DIAG IV INF INIT: CPT

## 2022-05-06 RX ORDER — LEVETIRACETAM 500 MG/1
500 TABLET ORAL 2 TIMES DAILY
Qty: 60 TABLET | Refills: 3 | Status: SHIPPED | OUTPATIENT
Start: 2022-05-06 | End: 2022-08-04

## 2022-05-06 RX ADMIN — SODIUM CHLORIDE, POTASSIUM CHLORIDE, SODIUM LACTATE AND CALCIUM CHLORIDE 1000 ML: 600; 310; 30; 20 INJECTION, SOLUTION INTRAVENOUS at 13:46

## 2022-05-06 RX ADMIN — LEVETIRACETAM 1000 MG: 100 INJECTION, SOLUTION INTRAVENOUS at 17:25

## 2022-05-06 ASSESSMENT — ENCOUNTER SYMPTOMS
NECK STIFFNESS: 0
FEVER: 0
DIZZINESS: 1
HEADACHES: 0
SHORTNESS OF BREATH: 0
EYE REDNESS: 0
CONFUSION: 0
DIFFICULTY URINATING: 0
SEIZURES: 1
ABDOMINAL PAIN: 0
ARTHRALGIAS: 0
COLOR CHANGE: 0
WEAKNESS: 1

## 2022-05-06 NOTE — ED PROVIDER NOTES
EMERGENCY DEPARTMENT ENCOUNTER     NAME: Matt Ramirez   AGE: 36 year old male   YOB: 1985   MRN: [id number]   EVALUATION DATE & TIME: 5/6/2022  2:43 PM   PCP: Aaron Hawley     Chief Complaint   Patient presents with     Generalized Weakness   :    FINAL IMPRESSION       1. Seizure disorder (H)           ED COURSE & MEDICAL DECISION MAKING      Pertinent Labs & Imaging studies reviewed. (See chart for details)   36 year old male  presents to the Emergency Department for evaluation of feeling fatigued.  Patient was here 2 days ago after several seizures.  He had had some head trauma at the time as well, but he also notes that he had a previous history of seizure disorder and was on Depakote as a teenager, and then had been on gabapentin for many years for restless legs.  The gabapentin was discontinued recently and that has shortly before he had the seizures.  The other day when he was seen on chart review he left AMA without the brain imaging and he states this is because he started to going to methadone withdrawal. Initial Vitals Reviewed. Initial exam notable for generally well-appearing male with normal vitals, normal neurologic exam, no acute distress.  His labs today show a mildly elevated CK but no signs of rhabdomyolysis.  I suspect that he truly did have seizures with an elevated CK, but currently shows no signs of status epilepticus.  He does not show any signs of alcohol withdrawal and denies benzodiazepines or alcohol.  I did get a CT head today which is fortunately negative.  I discussed the case with Dr. Adorno of neurology who recommended that we load him with Keppra, start outpatient Keppra twice daily and have him follow-up with neurology.  We discussed avoidance of driving and return precautions and he is comfortable with discharge.        2:57 PM Introduced myself to the patient, obtained history of present illness, and performed initial physical exam at this time. PPE:  Provider wore gloves, N95 mask, eye protection, surgical cap, and paper mask.    3:47 PM Paged Neuro for head CT consult.  4:09 PM Spoke to Dr. Adorno, Neurology, regarding the patient.  4:18 PM Discussed discharge with the patient. He is agreeable with this plan.    At the conclusion of the encounter I discussed the results of all of the tests and the disposition. The questions were answered. The patient or family acknowledged understanding and was agreeable with the care plan.         MEDICATIONS GIVEN IN THE EMERGENCY:   Medications   lactated ringers BOLUS 1,000 mL (1,000 mLs Intravenous New Bag 5/6/22 5953)      NEW PRESCRIPTIONS STARTED AT TODAY'S ER VISIT   New Prescriptions    No medications on file     ================================================================   HISTORY OF PRESENT ILLNESS       Patient information was obtained from: the patient   Use of Intrepreter: N/A   Matt Ramirez is a 36 year old male with history of duodenal stenosis, GERD, MEDARDO with panic attacks, peptic ulcer disease, and migraine without aura, who presents for evaluation of generalized weakness.     Per chart review, the patient was seen on 5/4 (two days ago), for evaluation of possible seizures. During this visit, the patient was given methadone and was told that a CT would be beneficial. The patient left AMA with instruction to follow up with worsening symptoms.     The patient states that he hit his head on Tuesday (three days ago), and began having seizures on Wednesday (two days ago). He was then seen at [facility] for this problem as documented in the chart review. He states that he left AMA from his prior visit because he was going through withdrawals and was unable to sit still for CT imaging at that time. He has not had any seizure activity since the ED visit on Wednesday. However, he endorses feeling weak and dizzy. The patient mentions that he has a distant history of seizures that were treated with Depakote  for a few years. He has been on Gabapentin for restless leg syndrome, but recently stopped taking it. He denies any other symptoms at this time.     ================================================================    REVIEW OF SYSTEMS       Review of Systems   Constitutional: Negative for fever.   HENT: Negative for congestion.    Eyes: Negative for redness.   Respiratory: Negative for shortness of breath.    Cardiovascular: Negative for chest pain.   Gastrointestinal: Negative for abdominal pain.   Genitourinary: Negative for difficulty urinating.   Musculoskeletal: Negative for arthralgias and neck stiffness.   Skin: Negative for color change.   Neurological: Positive for dizziness, seizures (resolved) and weakness. Negative for headaches.   Psychiatric/Behavioral: Negative for confusion.   All other systems reviewed and are negative.        PAST HISTORY     PAST MEDICAL HISTORY:   Past Medical History:   Diagnosis Date     Duodenal stenosis      Gastroesophageal reflux disease      Generalized anxiety disorder with panic attacks      Methadone use      Microcytic anemia      Peptic ulcer disease       PAST SURGICAL HISTORY:   Past Surgical History:   Procedure Laterality Date     PARTIAL GASTRECTOMY       TENDON REPAIR      left pinky finger      CURRENT MEDICATIONS:   methadone (DOLOPHINE-INTENSOL) 10 MG/ML (HIGH CONC) solution  ondansetron (ZOFRAN-ODT) 4 MG ODT tab      ALLERGIES:   No Known Allergies   FAMILY HISTORY:   Family History   Problem Relation Age of Onset     Heart Disease Mother      Hypertension Mother      Hyperlipidemia Mother      Anesthesia Reaction Mother         behavioral changes, no personal reaction     Diabetes Father      Hypertension Father      Hyperlipidemia Father      LUNG DISEASE Father      No Known Problems Brother      Diabetes Maternal Grandmother      Cerebrovascular Disease Maternal Grandmother      Deep Vein Thrombosis Maternal Grandmother      No Known Problems Brother       No Known Problems Brother      Colon Cancer No family hx of      Prostate Cancer No family hx of       SOCIAL HISTORY:   Social History     Socioeconomic History     Marital status:    Tobacco Use     Smoking status: Former Smoker     Packs/day: 1.00     Quit date: 2019     Years since quitting: 3.3     Smokeless tobacco: Never Used   Substance and Sexual Activity     Alcohol use: Not Currently     Drug use: Yes     Comment: Methadone        VITALS  Patient Vitals for the past 24 hrs:   BP Temp Pulse Resp SpO2 Height Weight   05/06/22 1347 (!) 136/90 -- 94 20 96 % -- --   05/06/22 0930 (!) 145/87 98.4  F (36.9  C) 116 20 96 % 1.829 m (6') 72.6 kg (160 lb)        ================================================================    PHYSICAL EXAM     VITAL SIGNS: BP (!) 136/90   Pulse 94   Temp 98.4  F (36.9  C)   Resp 20   Ht 1.829 m (6')   Wt 72.6 kg (160 lb)   SpO2 96%   BMI 21.70 kg/m     Constitutional:  Awake, no acute distress   HENT:  Atraumatic, oropharynx without exudate or erythema, membranes moist  Lymph:  No adenopathy  Eyes: EOM intact, PERRL, no injection  Neck: Supple  Respiratory:  Clear to auscultation bilaterally, no wheezes or crackles   Cardiovascular:  Regular rate and rhythm, single S1 and S2   GI:  Soft, nontender, nondistended, no rebound or guarding   Musculoskeletal:  Moves all extremities, no lower extremity edema, no deformities    Skin:  Warm, dry  Neurologic:  Alert and oriented x3, Follows commands, no focal deficits noted     ================================================================  LAB       All pertinent labs reviewed and interpreted.   Labs Ordered and Resulted from Time of ED Arrival to Time of ED Departure   HEPATIC FUNCTION PANEL - Abnormal       Result Value    Bilirubin Total 0.2      Bilirubin Direct 0.1      Protein Total 7.6      Albumin 4.3      Alkaline Phosphatase 125 (*)     AST 31      ALT 23     CK TOTAL - Abnormal     (*)    CBC WITH  PLATELETS AND DIFFERENTIAL - Abnormal    WBC Count 12.0 (*)     RBC Count 5.33      Hemoglobin 15.9      Hematocrit 46.8      MCV 88      MCH 29.8      MCHC 34.0      RDW 12.5      Platelet Count 274      % Neutrophils 76      % Lymphocytes 17      % Monocytes 5      % Eosinophils 0      % Basophils 1      % Immature Granulocytes 1      NRBCs per 100 WBC 0      Absolute Neutrophils 9.2 (*)     Absolute Lymphocytes 2.0      Absolute Monocytes 0.6      Absolute Eosinophils 0.0      Absolute Basophils 0.1      Absolute Immature Granulocytes 0.1      Absolute NRBCs 0.0     MAGNESIUM - Normal    Magnesium 2.5     BASIC METABOLIC PANEL - Normal    Sodium 139      Potassium 3.8      Chloride 100      Carbon Dioxide (CO2) 27      Anion Gap 12      Urea Nitrogen 16      Creatinine 0.89      Calcium 9.5      Glucose 101      GFR Estimate >90          ===============================================================  RADIOLOGY       Reviewed all pertinent imaging. Please see official radiology report.   Head CT w/o contrast   Final Result   IMPRESSION:   1.  No CT evidence for acute intracranial process.   2.  Inflammatory changes of the right maxillary sinus.            ================================================================  EKG         I have independently reviewed and interpreted the EKG(s) documented above.     ================================================================  PROCEDURES         I, Snow Dickinson, am serving as a scribe to document services personally performed by Dr. Teresa based on my observation and the provider's statements to me. I, Jane Teresa MD attest that Snow Dickinson is acting in a scribe capacity, has observed my performance of the services and has documented them in accordance with my direction.     Jane Teresa M.D.   Emergency Medicine   [facility] EMERGENCY DEPARTMENT  [facility] [facility] [address] [phone]  Dept:  [phone]        Jane Teresa MD  05/06/22 8064

## 2022-05-06 NOTE — DISCHARGE INSTRUCTIONS
As we discussed, the CT scan is negative but with seizures after stopping gabapentin the neurologist want you to start the new seizure medicine.  Call the number above to make an appointment because they need to see you in clinic for follow-up to figure out how long to keep you on medications, change and see if that is working, etc.  It is extremely important that you do not drive a vehicle for at least 90 days after a seizure.

## 2022-05-06 NOTE — ED TRIAGE NOTES
"Patient to triage via wheelchair with wife. States 2 days ago \"I had about 7 seizure, no history, came to ER, they wanted to admit me and run more tests, I wanted to go home. I am so tired, since, I don't think I have had any more seizures\"      "

## 2022-05-07 LAB
ATRIAL RATE - MUSE: 88 BPM
DIASTOLIC BLOOD PRESSURE - MUSE: NORMAL MMHG
INTERPRETATION ECG - MUSE: NORMAL
P AXIS - MUSE: 79 DEGREES
PR INTERVAL - MUSE: 138 MS
QRS DURATION - MUSE: 86 MS
QT - MUSE: 380 MS
QTC - MUSE: 459 MS
R AXIS - MUSE: -27 DEGREES
SYSTOLIC BLOOD PRESSURE - MUSE: NORMAL MMHG
T AXIS - MUSE: 62 DEGREES
VENTRICULAR RATE- MUSE: 88 BPM

## 2022-05-09 ENCOUNTER — HOSPITAL ENCOUNTER (EMERGENCY)
Facility: HOSPITAL | Age: 37
Discharge: HOME | End: 2022-05-09
Attending: EMERGENCY MEDICINE | Admitting: EMERGENCY MEDICINE
Payer: COMMERCIAL

## 2022-05-09 VITALS
WEIGHT: 160 LBS | HEART RATE: 84 BPM | HEIGHT: 71 IN | OXYGEN SATURATION: 97 % | BODY MASS INDEX: 22.4 KG/M2 | TEMPERATURE: 98.3 F | RESPIRATION RATE: 18 BRPM | DIASTOLIC BLOOD PRESSURE: 95 MMHG | SYSTOLIC BLOOD PRESSURE: 155 MMHG

## 2022-05-09 DIAGNOSIS — E86.0 DEHYDRATION: ICD-10-CM

## 2022-05-09 DIAGNOSIS — R53.83 FATIGUE, UNSPECIFIED TYPE: ICD-10-CM

## 2022-05-09 LAB
ANION GAP SERPL CALCULATED.3IONS-SCNC: 11 MMOL/L (ref 5–18)
BASOPHILS # BLD AUTO: 0.1 10E3/UL (ref 0–0.2)
BASOPHILS NFR BLD AUTO: 1 %
BUN SERPL-MCNC: 15 MG/DL (ref 8–22)
CALCIUM SERPL-MCNC: 9.4 MG/DL (ref 8.5–10.5)
CHLORIDE BLD-SCNC: 103 MMOL/L (ref 98–107)
CO2 SERPL-SCNC: 24 MMOL/L (ref 22–31)
CREAT SERPL-MCNC: 0.78 MG/DL (ref 0.7–1.3)
EOSINOPHIL # BLD AUTO: 0.1 10E3/UL (ref 0–0.7)
EOSINOPHIL NFR BLD AUTO: 1 %
ERYTHROCYTE [DISTWIDTH] IN BLOOD BY AUTOMATED COUNT: 11.6 % (ref 10–15)
FLUAV RNA SPEC QL NAA+PROBE: NEGATIVE
FLUBV RNA RESP QL NAA+PROBE: NEGATIVE
GFR SERPL CREATININE-BSD FRML MDRD: >90 ML/MIN/1.73M2
GLUCOSE BLD-MCNC: 118 MG/DL (ref 70–125)
HCT VFR BLD AUTO: 41.7 % (ref 40–53)
HGB BLD-MCNC: 14.3 G/DL (ref 13.3–17.7)
IMM GRANULOCYTES # BLD: 0 10E3/UL
IMM GRANULOCYTES NFR BLD: 0 %
LYMPHOCYTES # BLD AUTO: 1.5 10E3/UL (ref 0.8–5.3)
LYMPHOCYTES NFR BLD AUTO: 16 %
MCH RBC QN AUTO: 30.1 PG (ref 26.5–33)
MCHC RBC AUTO-ENTMCNC: 34.3 G/DL (ref 31.5–36.5)
MCV RBC AUTO: 88 FL (ref 78–100)
MONOCYTES # BLD AUTO: 0.5 10E3/UL (ref 0–1.3)
MONOCYTES NFR BLD AUTO: 6 %
NEUTROPHILS # BLD AUTO: 7.1 10E3/UL (ref 1.6–8.3)
NEUTROPHILS NFR BLD AUTO: 76 %
NRBC # BLD AUTO: 0 10E3/UL
NRBC BLD AUTO-RTO: 0 /100
PLATELET # BLD AUTO: 260 10E3/UL (ref 150–450)
POTASSIUM BLD-SCNC: 3.9 MMOL/L (ref 3.5–5)
RBC # BLD AUTO: 4.75 10E6/UL (ref 4.4–5.9)
SARS-COV-2 RNA RESP QL NAA+PROBE: NEGATIVE
SODIUM SERPL-SCNC: 138 MMOL/L (ref 136–145)
WBC # BLD AUTO: 9.3 10E3/UL (ref 4–11)

## 2022-05-09 PROCEDURE — 250N000011 HC RX IP 250 OP 636: Performed by: EMERGENCY MEDICINE

## 2022-05-09 PROCEDURE — 87636 SARSCOV2 & INF A&B AMP PRB: CPT | Performed by: EMERGENCY MEDICINE

## 2022-05-09 PROCEDURE — C9803 HOPD COVID-19 SPEC COLLECT: HCPCS

## 2022-05-09 PROCEDURE — 93005 ELECTROCARDIOGRAM TRACING: CPT | Performed by: STUDENT IN AN ORGANIZED HEALTH CARE EDUCATION/TRAINING PROGRAM

## 2022-05-09 PROCEDURE — 85025 COMPLETE CBC W/AUTO DIFF WBC: CPT | Performed by: EMERGENCY MEDICINE

## 2022-05-09 PROCEDURE — 96376 TX/PRO/DX INJ SAME DRUG ADON: CPT

## 2022-05-09 PROCEDURE — 250N000013 HC RX MED GY IP 250 OP 250 PS 637: Performed by: STUDENT IN AN ORGANIZED HEALTH CARE EDUCATION/TRAINING PROGRAM

## 2022-05-09 PROCEDURE — 96374 THER/PROPH/DIAG INJ IV PUSH: CPT

## 2022-05-09 PROCEDURE — 36415 COLL VENOUS BLD VENIPUNCTURE: CPT | Performed by: EMERGENCY MEDICINE

## 2022-05-09 PROCEDURE — 250N000011 HC RX IP 250 OP 636: Performed by: STUDENT IN AN ORGANIZED HEALTH CARE EDUCATION/TRAINING PROGRAM

## 2022-05-09 PROCEDURE — 80048 BASIC METABOLIC PNL TOTAL CA: CPT | Performed by: EMERGENCY MEDICINE

## 2022-05-09 PROCEDURE — 258N000003 HC RX IP 258 OP 636: Performed by: EMERGENCY MEDICINE

## 2022-05-09 PROCEDURE — 96361 HYDRATE IV INFUSION ADD-ON: CPT

## 2022-05-09 PROCEDURE — 99285 EMERGENCY DEPT VISIT HI MDM: CPT | Mod: 25,CS

## 2022-05-09 PROCEDURE — 96375 TX/PRO/DX INJ NEW DRUG ADDON: CPT

## 2022-05-09 RX ORDER — ONDANSETRON 2 MG/ML
4 INJECTION INTRAMUSCULAR; INTRAVENOUS ONCE
Status: COMPLETED | OUTPATIENT
Start: 2022-05-09 | End: 2022-05-09

## 2022-05-09 RX ORDER — MECLIZINE HCL 12.5 MG 12.5 MG/1
25 TABLET ORAL ONCE
Status: COMPLETED | OUTPATIENT
Start: 2022-05-09 | End: 2022-05-09

## 2022-05-09 RX ORDER — SODIUM CHLORIDE 9 MG/ML
1000 INJECTION, SOLUTION INTRAVENOUS CONTINUOUS
Status: DISCONTINUED | OUTPATIENT
Start: 2022-05-09 | End: 2022-05-09 | Stop reason: HOSPADM

## 2022-05-09 RX ORDER — MECLIZINE HYDROCHLORIDE 25 MG/1
25 TABLET ORAL 3 TIMES DAILY PRN
Qty: 10 TABLET | Refills: 0 | Status: SHIPPED | OUTPATIENT
Start: 2022-05-09

## 2022-05-09 RX ORDER — KETOROLAC TROMETHAMINE 30 MG/ML
15 INJECTION, SOLUTION INTRAMUSCULAR; INTRAVENOUS ONCE
Status: COMPLETED | OUTPATIENT
Start: 2022-05-09 | End: 2022-05-09

## 2022-05-09 RX ORDER — DIAZEPAM 10 MG/2ML
2.5 INJECTION, SOLUTION INTRAMUSCULAR; INTRAVENOUS ONCE
Status: COMPLETED | OUTPATIENT
Start: 2022-05-09 | End: 2022-05-09

## 2022-05-09 RX ORDER — DIPHENHYDRAMINE HYDROCHLORIDE 50 MG/ML
25 INJECTION INTRAMUSCULAR; INTRAVENOUS ONCE
Status: COMPLETED | OUTPATIENT
Start: 2022-05-09 | End: 2022-05-09

## 2022-05-09 RX ADMIN — ONDANSETRON 4 MG: 2 INJECTION INTRAMUSCULAR; INTRAVENOUS at 07:10

## 2022-05-09 RX ADMIN — SODIUM CHLORIDE 1000 ML: 9 INJECTION, SOLUTION INTRAVENOUS at 04:44

## 2022-05-09 RX ADMIN — DIAZEPAM 2.5 MG: 5 INJECTION, SOLUTION INTRAMUSCULAR; INTRAVENOUS at 04:44

## 2022-05-09 RX ADMIN — ONDANSETRON 4 MG: 2 INJECTION INTRAMUSCULAR; INTRAVENOUS at 04:47

## 2022-05-09 RX ADMIN — SODIUM CHLORIDE 1000 ML: 9 INJECTION, SOLUTION INTRAVENOUS at 05:40

## 2022-05-09 RX ADMIN — DIPHENHYDRAMINE HYDROCHLORIDE 25 MG: 50 INJECTION, SOLUTION INTRAMUSCULAR; INTRAVENOUS at 04:45

## 2022-05-09 RX ADMIN — MECLIZINE HCL 12.5 MG 25 MG: 12.5 TABLET ORAL at 07:09

## 2022-05-09 RX ADMIN — KETOROLAC TROMETHAMINE 15 MG: 30 INJECTION, SOLUTION INTRAMUSCULAR at 04:48

## 2022-05-09 ASSESSMENT — ENCOUNTER SYMPTOMS
FEVER: 0
ABDOMINAL PAIN: 0
DIZZINESS: 1
APPETITE CHANGE: 1
BLOOD IN STOOL: 0
DIARRHEA: 0
WEAKNESS: 1
NAUSEA: 1
FATIGUE: 1
VOMITING: 0

## 2022-05-09 NOTE — ED TRIAGE NOTES
Seen Wed for seizure, seen Friday for dizziness, nausea and weakness. Returns tonight for fatigue, dizziness, nausea and feeling shaky .       Triage Assessment     Row Name 05/09/22 0310       Triage Assessment (Adult)    Airway WDL WDL       Respiratory WDL    Respiratory WDL WDL       Skin Circulation/Temperature WDL    Skin Circulation/Temperature WDL WDL       Cardiac WDL    Cardiac WDL WDL       Peripheral/Neurovascular WDL    Peripheral Neurovascular WDL WDL       Cognitive/Neuro/Behavioral WDL    Cognitive/Neuro/Behavioral WDL WDL

## 2022-05-09 NOTE — ED NOTES
Progress Note    The patient was signed out to me by Dr. Sims at 4:54 AM  5:54 AM I introduced myself to the patient. Discussed plan of management.    Brief HPI:   37 y/o M with several recent ED visits for possible seizures. CT head without acute findings, started on keppra but hasn't gotten prescription filled yet. Of note, he is approximately 11 days out from discontinuing gabapentin and suspected this is contributing to his symptoms.  He is on chronic methadone. No neurologic symptoms or additional seizures. He is here for fatigue, generalized weakness, lightheadedness.         EKG:     Impression: Normal EKG    Rate: 79 bpm  Rhythm: Normal sinus  Axis: 71  MA interval: 150 ms  QRS interval: 96 ms  QTc interval: 460 ms  ST changes: No acute ST elevations. No WPW. No Brugada  Comparison (5/6/22): QRS axis has shifted right      MDM: 37 y/o M who presents with fatigue, weakness, lightheadedness. Vitals notable for mild hypertension otherwise reassuring and he is nontoxic appearing. As described above recent ED visits for possible seizure, started on keppra (hasn't picked up prescription but states he will). He was signed out to me pending labs and reassessment. His CBC and BMP are reassuring. Covid swab is negative. Prior provider gave fluids, 1 dose of valium, benadryl, toradol, zofran.  On reassessment patient repots no improvement. Tells me he is feeling very dizzy which feels both like a spinning sensation and like he is going to pass out, both have been there since last week but have been worsening. I did obtain an EKG which is sinus rhythm without concerning ischemic changes, signs of WPW or brugada, QTc is normal. No chest pain or shortness of breath, doubt ACS or PE. Dx would also include stroke, mass, ICH, medication adverse effect, substance use or withdrawal etc.    I performed full neurologic exam which was reassuring including no dysmetria and normal rapid alternating movements, given reassuring  exam and recent negative head CT and no additional seizure activity and I do not suspect stroke or other emergent intracranial pathology warranting MRI or other head imaging today. Gabapentin withdrawal possibly contributing to symptoms. Given meclizine and additional zofran with some improvement and he felt comfortable with discharge home with close neurology follow up. Strict return precautions discussed.    (R53.83) Fatigue, unspecified type  (E86.0) Dehydration      Gertrudis Marques MD  Emergency Medicine  [facility]       Gertrudis Marques MD  05/09/22 3734

## 2022-05-09 NOTE — Clinical Note
Matt Ramirez was seen and treated in our emergency department on 5/9/2022.  He may return to work on 05/11/2022.  This note is to certify that this patient was seen here in the ED at [facility].  Please excuse him from work for the next 2 days.  The patient can return to work on the after mentioned date above.  If you have further questions please call the [facility] emergency department.     If you have any questions or concerns, please don't hesitate to call.      Fahad Almeida PA-C

## 2022-05-09 NOTE — DISCHARGE INSTRUCTIONS
He is follow-up with neurology and mentioned that this is an emergency department follow-up for new onset seizures.  Please fill and begin taking the Keppra.    Please make sure you are eating something even if you feel like your appetite is suppressed.  Start with clear liquids such as Gatorade or soup broth.    You can take meclizine as needed for dizziness

## 2022-05-09 NOTE — ED NOTES
Pt ambulated in [address]. Pt reported feeling dizzy and lightheaded. EKG normal. Orthostatic Bp stable. Provider notified.

## 2022-05-09 NOTE — ED PROVIDER NOTES
EMERGENCY DEPARTMENT ENCOUNTER      NAME: Matt Ramirez  AGE: 36 year old male  YOB: 1985  MRN: [id number]  EVALUATION DATE & TIME: 5/9/2022  3:13 AM    PCP: Aaron Hawley    ED PROVIDER: Elle Sims M.D.      Chief Complaint   Patient presents with     Fatigue         FINAL IMPRESSION:  1. Fatigue, unspecified type    2. Dehydration        MEDICAL DECISION MAKING:    3:15 AM I met with the patient, obtained history, performed an initial exam, and discussed options and plan for diagnostics and treatment here in the ED. PPE worn: cloth mask, surgical mask, eye protection and gloves.    Pertinent Labs & Imaging studies reviewed. (See chart for details)  ED Course as of 05/12/22 0349   Mon May 09, 2022   0453 Couple episodes of seizure activity. Seen Thursday and Friday and started on keppra. Never filled his prescription. No known seizure disorder. Had been on long time gabapentin and recently stopped this, so possible gabapentin withdrawal.  If labs are reassuring plan for discharge with neurology as planned.        Matt Ramirez is a 36 year old male who presents with generalized fatigue.  No focal neurologic deficits.  Was seen multiple times in the ED in the last few days.  He did have a seizure for the first time on 5-4 and then on 5-6.  He was started on Keppra on 5-6 but has been unable to follow-up to get his prescription filled.  Of note, he is approximately 11 days out from discontinuing gabapentin.  He is on chronic methadone and denies any recent opiate use.  He has been taking his methadone as prescribed.  His exam is largely unremarkable.  He is tachycardic and appears to have dry mucous membranes.  Since he has not been eating and drinking, I do feel his fatigue is likely related to poor oral intake and dehydration.  I will check his electrolytes to evaluate for any acute kidney injury as a cause of his symptoms.  I do believe he is having withdrawal from gabapentin.  I  am not sure if Keppra is contributing to his symptoms as this can cause fatigue but he has not taken any additional doses aside from the loading dose he got in the emergency department on 5-6.    This patient will be signed out to Dr. Marquse pending the results of his labs and response to IV fluids.  I anticipate discharge home with follow-up in neurology clinic for evaluation of the seizure etiology.      MEDICATIONS GIVEN IN THE EMERGENCY:  Medications   0.9% sodium chloride BOLUS (0 mLs Intravenous Stopped 5/9/22 0539)   diazepam (VALIUM) injection 2.5 mg (2.5 mg Intravenous Given 5/9/22 0444)   diphenhydrAMINE (BENADRYL) injection 25 mg (25 mg Intravenous Given 5/9/22 0445)   ketorolac (TORADOL) injection 15 mg (15 mg Intravenous Given 5/9/22 0448)   ondansetron (ZOFRAN) injection 4 mg (4 mg Intravenous Given 5/9/22 0447)   meclizine (ANTIVERT) tablet 25 mg (25 mg Oral Given 5/9/22 0709)   ondansetron (ZOFRAN) injection 4 mg (4 mg Intravenous Given 5/9/22 0710)       =================================================================    HPI    Patient information was obtained from: patient     Use of : Use of : N/A      Matt Ramirez is a 36 year old male with a history of methadone use, who presents with generalized weakness, nausea, dizziness, decreased appetite.     Chart was reviewed:  5/6/22: [facility] Emergency Department: Patient presented generalized weakness. Labs did show a mildly elevated CK. Neurology Dr. Adorno was consulted who recommended dose of Keppra in ED with outpatient Keppra prescribed and follow up with neurology. Patient discharged in stable condition.     Patient was recently had a witnessed seizure on on 5/4 with evaluation on 5/7 with dizziness, nausea and weakness. He was prescribed Keppra at the time and had one dose during ED but has not been able to fill the scripts yet secondary to increase in generalized weakness. He says he is  unable to eat or sleep well due to the dizziness. He has not been able to set up an appointment with neurology yet but does have a follow up appointment today. He was previously on Gabapentin but had stopped taking it 2 weeks ago.There has been no changes to his bowel or bladder habits. He does endorse some nausea but has not yet vomited. He is still taking methadone for the past 15 years with no changes in dosing. He has not been taking any additional opiates. Otherwise no chest pain, abdominal pain, fever, diarrhea, blood in stool. No other medical complaints at this time.     REVIEW OF SYSTEMS   Review of Systems   Constitutional: Positive for appetite change (decreased) and fatigue. Negative for fever.   Cardiovascular: Negative for chest pain.   Gastrointestinal: Positive for nausea. Negative for abdominal pain, blood in stool, diarrhea and vomiting.   Genitourinary: Negative.    Neurological: Positive for dizziness and weakness (generalized).   All other systems reviewed and are negative.       PAST MEDICAL HISTORY:  Past Medical History:   Diagnosis Date     Duodenal stenosis      Gastroesophageal reflux disease      Generalized anxiety disorder with panic attacks      Methadone use      Microcytic anemia      Peptic ulcer disease        PAST SURGICAL HISTORY:  Past Surgical History:   Procedure Laterality Date     PARTIAL GASTRECTOMY       TENDON REPAIR      left pinky finger       CURRENT MEDICATIONS:      Current Facility-Administered Medications:      0.9% sodium chloride BOLUS, 1,000 mL, Intravenous, Once, Elle Sims MD     diazepam (VALIUM) injection 2.5 mg, 2.5 mg, Intravenous, Once, Elle Sims MD     diphenhydrAMINE (BENADRYL) injection 25 mg, 25 mg, Intravenous, Once, Elle Sims MD     ketorolac (TORADOL) injection 15 mg, 15 mg, Intravenous, Once, Elle Sims MD     ondansetron (ZOFRAN) injection 4 mg, 4 mg, Intravenous, Once, Elle Sims MD     sodium  "chloride 0.9% infusion, 1,000 mL, Intravenous, Continuous, Elle Sims MD    Current Outpatient Medications:      levETIRAcetam (KEPPRA) 500 MG tablet, Take 1 tablet (500 mg) by mouth 2 times daily, Disp: 60 tablet, Rfl: 3     methadone (DOLOPHINE-INTENSOL) 10 MG/ML (HIGH CONC) solution, Take 170 mg by mouth daily , Disp: , Rfl:      ondansetron (ZOFRAN-ODT) 4 MG ODT tab, Take 1 tablet (4 mg) by mouth every 6 hours as needed for nausea, Disp: 10 tablet, Rfl: 0    ALLERGIES:  No Known Allergies    FAMILY HISTORY:  Family History   Problem Relation Age of Onset     Heart Disease Mother      Hypertension Mother      Hyperlipidemia Mother      Anesthesia Reaction Mother         behavioral changes, no personal reaction     Diabetes Father      Hypertension Father      Hyperlipidemia Father      LUNG DISEASE Father      No Known Problems Brother      Diabetes Maternal Grandmother      Cerebrovascular Disease Maternal Grandmother      Deep Vein Thrombosis Maternal Grandmother      No Known Problems Brother      No Known Problems Brother      Colon Cancer No family hx of      Prostate Cancer No family hx of        SOCIAL HISTORY:   Social History     Tobacco Use     Smoking status: Former Smoker     Packs/day: 1.00     Quit date: 2019     Years since quitting: 3.3     Smokeless tobacco: Never Used   Substance Use Topics     Alcohol use: Not Currently     Drug use: Yes     Comment: Methadone        PHYSICAL EXAM:    Vitals: BP (!) 146/104   Pulse 120   Temp 98.3  F (36.8  C) (Temporal)   Resp 18   Ht 1.803 m (5' 11\")   Wt 72.6 kg (160 lb)   SpO2 97%   BMI 22.32 kg/m     General:. Alert and interactive, comfortable appearing.  HENT: Oropharynx without erythema or exudates. MMM.  TMs clear bilaterally.  Eyes: Pupils mid-sized and equally reactive.   Neck: Full AROM.  No midline tenderness to palpation.  Cardiovascular: Tachycardia, Regular rate and rhythm. Peripheral pulses 2+ bilaterally.  Chest/Pulmonary: " Normal work of breathing. Lung sounds clear and equal throughout, no wheezes or crackles. No chest wall tenderness or deformities.  Abdomen: Soft, nondistended. Nontender without guarding or rebound.  Back/Spine: No CVA or midline tenderness.  Extremities: Normal ROM of all major joints. No lower extremity edema.   Skin: Warm and dry. Normal skin color.   Neuro: Speech clear. CNs grossly intact. Moves all extremities appropriately. Strength and sensation grossly intact to all extremities.   Psych: Normal affect/mood, cooperative, memory appropriate.     LAB:  Pending      I, Hilary Meléndez, am serving as a scribe to document services personally performed by Dr. Elle Sims  based on my observation and the provider's statements to me. I, Elle Sims MD attest that Hilary Meléndez is acting in a scribe capacity, has observed my performance of the services and has documented them in accordance with my direction.      Elle Sims M.D.  Emergency Medicine  [facility] EMERGENCY DEPARTMENT  [facility] [facility] [id number] [phone]  Dept: [phone]             Elle Sims MD  05/09/22 0452       Elle Sims MD  05/12/22 5508

## 2022-05-11 ENCOUNTER — APPOINTMENT (OUTPATIENT)
Dept: ULTRASOUND IMAGING | Facility: HOSPITAL | Age: 37
End: 2022-05-11
Attending: EMERGENCY MEDICINE
Payer: COMMERCIAL

## 2022-05-11 ENCOUNTER — APPOINTMENT (OUTPATIENT)
Dept: CT IMAGING | Facility: HOSPITAL | Age: 37
End: 2022-05-11
Attending: EMERGENCY MEDICINE
Payer: COMMERCIAL

## 2022-05-11 ENCOUNTER — HOSPITAL ENCOUNTER (EMERGENCY)
Facility: HOSPITAL | Age: 37
Discharge: HOME | End: 2022-05-11
Attending: EMERGENCY MEDICINE | Admitting: EMERGENCY MEDICINE
Payer: COMMERCIAL

## 2022-05-11 VITALS
BODY MASS INDEX: 22.4 KG/M2 | DIASTOLIC BLOOD PRESSURE: 78 MMHG | OXYGEN SATURATION: 100 % | WEIGHT: 160 LBS | RESPIRATION RATE: 16 BRPM | HEIGHT: 71 IN | TEMPERATURE: 98.7 F | SYSTOLIC BLOOD PRESSURE: 120 MMHG | HEART RATE: 55 BPM

## 2022-05-11 DIAGNOSIS — F11.20 METHADONE MAINTENANCE THERAPY PATIENT (H): ICD-10-CM

## 2022-05-11 DIAGNOSIS — R11.2 NON-INTRACTABLE VOMITING WITH NAUSEA, UNSPECIFIED VOMITING TYPE: ICD-10-CM

## 2022-05-11 LAB
ALBUMIN SERPL-MCNC: 3.5 G/DL (ref 3.5–5)
ALP SERPL-CCNC: 101 U/L (ref 45–120)
ALT SERPL W P-5'-P-CCNC: 26 U/L (ref 0–45)
ANION GAP SERPL CALCULATED.3IONS-SCNC: 10 MMOL/L (ref 5–18)
AST SERPL W P-5'-P-CCNC: 21 U/L (ref 0–40)
BILIRUB SERPL-MCNC: 0.3 MG/DL (ref 0–1)
BUN SERPL-MCNC: 12 MG/DL (ref 8–22)
CALCIUM SERPL-MCNC: 8.8 MG/DL (ref 8.5–10.5)
CHLORIDE BLD-SCNC: 106 MMOL/L (ref 98–107)
CO2 SERPL-SCNC: 25 MMOL/L (ref 22–31)
CREAT SERPL-MCNC: 0.74 MG/DL (ref 0.7–1.3)
ERYTHROCYTE [DISTWIDTH] IN BLOOD BY AUTOMATED COUNT: 11.7 % (ref 10–15)
GFR SERPL CREATININE-BSD FRML MDRD: >90 ML/MIN/1.73M2
GLUCOSE BLD-MCNC: 100 MG/DL (ref 70–125)
HCT VFR BLD AUTO: 39.7 % (ref 40–53)
HGB BLD-MCNC: 13.6 G/DL (ref 13.3–17.7)
MCH RBC QN AUTO: 30 PG (ref 26.5–33)
MCHC RBC AUTO-ENTMCNC: 34.3 G/DL (ref 31.5–36.5)
MCV RBC AUTO: 88 FL (ref 78–100)
PLATELET # BLD AUTO: 246 10E3/UL (ref 150–450)
POTASSIUM BLD-SCNC: 3.5 MMOL/L (ref 3.5–5)
PROT SERPL-MCNC: 6.5 G/DL (ref 6–8)
RBC # BLD AUTO: 4.53 10E6/UL (ref 4.4–5.9)
SODIUM SERPL-SCNC: 141 MMOL/L (ref 136–145)
WBC # BLD AUTO: 6.2 10E3/UL (ref 4–11)

## 2022-05-11 PROCEDURE — 80053 COMPREHEN METABOLIC PANEL: CPT | Performed by: EMERGENCY MEDICINE

## 2022-05-11 PROCEDURE — 250N000013 HC RX MED GY IP 250 OP 250 PS 637: Performed by: EMERGENCY MEDICINE

## 2022-05-11 PROCEDURE — 99285 EMERGENCY DEPT VISIT HI MDM: CPT | Mod: 25

## 2022-05-11 PROCEDURE — 96375 TX/PRO/DX INJ NEW DRUG ADDON: CPT

## 2022-05-11 PROCEDURE — 258N000003 HC RX IP 258 OP 636: Performed by: EMERGENCY MEDICINE

## 2022-05-11 PROCEDURE — 74177 CT ABD & PELVIS W/CONTRAST: CPT

## 2022-05-11 PROCEDURE — 76705 ECHO EXAM OF ABDOMEN: CPT

## 2022-05-11 PROCEDURE — 36415 COLL VENOUS BLD VENIPUNCTURE: CPT | Performed by: EMERGENCY MEDICINE

## 2022-05-11 PROCEDURE — 85027 COMPLETE CBC AUTOMATED: CPT | Performed by: EMERGENCY MEDICINE

## 2022-05-11 PROCEDURE — 250N000011 HC RX IP 250 OP 636: Performed by: EMERGENCY MEDICINE

## 2022-05-11 PROCEDURE — 96374 THER/PROPH/DIAG INJ IV PUSH: CPT | Mod: 59

## 2022-05-11 PROCEDURE — 96361 HYDRATE IV INFUSION ADD-ON: CPT

## 2022-05-11 RX ORDER — IOPAMIDOL 755 MG/ML
100 INJECTION, SOLUTION INTRAVASCULAR ONCE
Status: COMPLETED | OUTPATIENT
Start: 2022-05-11 | End: 2022-05-11

## 2022-05-11 RX ORDER — SODIUM CHLORIDE 9 MG/ML
INJECTION, SOLUTION INTRAVENOUS CONTINUOUS
Status: DISCONTINUED | OUTPATIENT
Start: 2022-05-11 | End: 2022-05-11 | Stop reason: HOSPADM

## 2022-05-11 RX ORDER — METHADONE HYDROCHLORIDE 10 MG/ML
70 CONCENTRATE ORAL ONCE
Status: COMPLETED | OUTPATIENT
Start: 2022-05-11 | End: 2022-05-11

## 2022-05-11 RX ORDER — ONDANSETRON 4 MG/1
4 TABLET, ORALLY DISINTEGRATING ORAL EVERY 8 HOURS PRN
Qty: 10 TABLET | Refills: 0 | Status: SHIPPED | OUTPATIENT
Start: 2022-05-11 | End: 2022-05-14

## 2022-05-11 RX ORDER — DIPHENHYDRAMINE HYDROCHLORIDE 50 MG/ML
50 INJECTION INTRAMUSCULAR; INTRAVENOUS ONCE
Status: COMPLETED | OUTPATIENT
Start: 2022-05-11 | End: 2022-05-11

## 2022-05-11 RX ORDER — ONDANSETRON 2 MG/ML
8 INJECTION INTRAMUSCULAR; INTRAVENOUS ONCE
Status: COMPLETED | OUTPATIENT
Start: 2022-05-11 | End: 2022-05-11

## 2022-05-11 RX ADMIN — SODIUM CHLORIDE: 9 INJECTION, SOLUTION INTRAVENOUS at 09:10

## 2022-05-11 RX ADMIN — SODIUM CHLORIDE 1000 ML: 9 INJECTION, SOLUTION INTRAVENOUS at 07:53

## 2022-05-11 RX ADMIN — METHADONE HYDROCHLORIDE 70 MG: 10 CONCENTRATE ORAL at 11:44

## 2022-05-11 RX ADMIN — SODIUM CHLORIDE 1000 ML: 9 INJECTION, SOLUTION INTRAVENOUS at 07:08

## 2022-05-11 RX ADMIN — ONDANSETRON 8 MG: 2 INJECTION INTRAMUSCULAR; INTRAVENOUS at 07:10

## 2022-05-11 RX ADMIN — DIPHENHYDRAMINE HYDROCHLORIDE 50 MG: 50 INJECTION, SOLUTION INTRAMUSCULAR; INTRAVENOUS at 07:13

## 2022-05-11 RX ADMIN — FAMOTIDINE 20 MG: 10 INJECTION, SOLUTION INTRAVENOUS at 07:15

## 2022-05-11 RX ADMIN — IOPAMIDOL 100 ML: 755 INJECTION, SOLUTION INTRAVENOUS at 08:14

## 2022-05-11 NOTE — ED TRIAGE NOTES
Pt arrives via [facility] truck [id number] from home endorsing no improvement in pt's symptoms -more so worsening of symptoms since April 30th, pt has been nauseous and vomiting x 4-5 days and unable to keep anything down (including methadone), weakness, and dizziness.      Series of events: Since April 30th pt has been feeling ill (dizzy, nausea) and on May 4th pt had 7 seizure episodes of which pt was seen for and  endorsed this was due to abruptly stopping his gabapentin (due to running out of med and low concern) of which he was taking for restless legs syndrome. No prior hx of seizures. No seizures since may 4th. Pt was Started on Keppra this past weekend and Pt has eyes closed in triage due to the dizziness.     Pt is a methadone pt as of 15 years -requests to be weened off but told otherwise.    EMS Blood sugar: 119,   EMS gave 500cc NS bolus and 4mg Zofran IV

## 2022-05-11 NOTE — Clinical Note
Matt Ramirez was seen and treated in our emergency department on 5/11/2022.         Sincerely,     [facility] Emergency Department

## 2022-05-11 NOTE — ED PROVIDER NOTES
EMERGENCY DEPARTMENT ENCOUNTER      NAME: Matt Ramirez  AGE: 36 year old male  YOB: 1985  MRN: [id number]  EVALUATION DATE & TIME: 5/11/2022  6:33 AM    PCP: Aaron Hawley    ED PROVIDER: Zhen Escalona M.D.      Chief Complaint   Patient presents with     Nausea & Vomiting     Generalized Weakness     Dizziness         FINAL IMPRESSION:  Nausea and vomiting  Abdominal pain  Chronic methadone therapy    ED COURSE & MEDICAL DECISION MAKING:    Pertinent Labs & Imaging studies reviewed. (See chart for details)  36 year old male presents to the Emergency Department for reevaluation of nausea, vomiting and abdominal pain.  Patient has presented multiple times for this over the last week or so.  Initially thought to be related to gabapentin withdrawal and potential issues with methadone withdrawal secondary to vomiting his daily dose.  Previous laboratory evaluation reassuring.  Patient treated symptomatically.  Patient reports history of prior ulcers with surgical intervention slightly more than a year ago.  By description patient had partial gastrectomy with duodenal resection.  Concerns stricture raised.  Patient treated symptomatically once again with intravenous fluids, Zofran, Benadryl and Pepcid.  Labs sent for evaluation.  CT imaging will be obtained.  On exam patient in mild distress.  Mild epigastric tenderness.. Patient appears non toxic with stable vitals signs. Overall exam is benign.      6:41 AM I met with the patient for the initial interview and physical examination. Discussed plan for treatment and workup in the ED.    7:44 AM.  Laboratory evaluation normal.  Awaiting CT imaging.  9:09 AM.  CT imaging remarkable for biliary ductal dilation and gallbladder distention.  This is new compared to exam in late 2020.  Given persistent symptoms we will proceed with gallbladder ultrasound.  9:25 AM I rechecked and updated the patient. Patient is requesting his methadone again; he says his  clinic will not be able to give him his methadone until tomorrow.   9:47 AM.  After extensive chart review noted that patient has presented 7 different occasions since January with reports nausea and vomiting and has received dosages and has received methadone.  Will discuss situation with his methadone treatment center [facility].  10:30 AM I spoke provider at the patient's treatment center. They are going to discuss the patient's recent frequent ED visits and consider changes in his treatment plan.  Decision to provide dose of methadone at provider's discretion.  11:16 AM.  Ultrasound with some mild biliary sludge.  No findings for acute cholecystitis.  Mild biliary duct dilation unchanged.  Results discussed with patient.  We will proceed with half dose methadone.  Patient instructed to follow-up with primary care provider for further needs.  At the conclusion of the encounter I discussed the results of all of the tests and the disposition. The questions were answered and return precautions provided. The patient or family acknowledged understanding and was agreeable with the care plan.       PPE: Provider wore gloves, N95 mask, eye protection, surgical cap.     MEDICATIONS GIVEN IN THE EMERGENCY:  Medications   0.9% sodium chloride BOLUS (0 mLs Intravenous Stopped 5/11/22 0910)     Followed by   0.9% sodium chloride BOLUS (0 mLs Intravenous Stopped 5/11/22 0754)     Followed by   sodium chloride 0.9% infusion ( Intravenous New Bag 5/11/22 0910)   ondansetron (ZOFRAN) injection 8 mg (8 mg Intravenous Given 5/11/22 0710)   diphenhydrAMINE (BENADRYL) injection 50 mg (50 mg Intravenous Given 5/11/22 0713)   famotidine (PEPCID) injection 20 mg (20 mg Intravenous Given 5/11/22 0715)   iopamidol (ISOVUE-370) solution 100 mL (100 mLs Intravenous Given 5/11/22 0814)       NEW PRESCRIPTIONS STARTED AT TODAY'S ER VISIT  New Prescriptions    No medications on file       "    =================================================================    HPI    Patient information was obtained from: Patient     Use of Intrepreter: N/A         Matt Ramirez is a 36 year old male with a pertient medical history of GERD, peptic ulcer disease, duodenal stenosis, generalized anxiety disorder, and methadone use who presents to the ED for evaluation of nausea, vomiting, and dizziness.     Patient reports 4-5 days of dizziness, nausea, and vomiting. He states that he started having seizures last Wednesday (1 week ago), which \"triggered all this.\" Patient has been feeling generally unwell since then and states that he has been vomiting \"constantly.\" Patient states that his seizure episodes were possibly caused by abruptly stopping his gabapentin. He states that he has been taking his Keppra. Patient also notes a history of ulcers and states that he had surgery ~1 year ago at [facility] where they \"removed part of my stomach.\" He denies diarrhea or additional symptoms or complaints at this time.     Per chart review, patient has had multiple ED visits over the past week. Seen on 5/4/22 for evaluation of seizure-like activity. Patient concerned for methadone withdrawal. Shortly after receiving methadone, the patient removed his IV and informed the nurses that he did not wish to have further evaluation. He was then seen on 5/6/22 for evaluation of feeling fatigued.  Patient reported his gabapentin was discontinued shortly before he had the recent seizures. Labs showed elevated CK but no signs of rhabdomyolysis. Head CT was negative. Discussed with neurology who recommended starting outpatient Keppra and follow up with neurology. Seen again with generalized fatigue on 5/9/22. BMP and CBC were WNL. Discharged with prescription for meclizine.        REVIEW OF SYSTEMS   Constitutional:  Denies fever, chills  Respiratory:  Denies productive cough or increased work of breathing  Cardiovascular:  " Denies chest pain, palpitations  GI:  Positive for nausea and vomiting. Denies abdominal pain or change in bowel or bladder habits   Musculoskeletal:  Denies any new muscle/joint swelling  Skin:  Denies rash   Neurologic: Positive for dizziness. Denies focal weakness  All systems negative except as marked.     PAST MEDICAL HISTORY:  Past Medical History:   Diagnosis Date     Duodenal stenosis      Gastroesophageal reflux disease      Generalized anxiety disorder with panic attacks      Methadone use      Microcytic anemia      Peptic ulcer disease        PAST SURGICAL HISTORY:  Past Surgical History:   Procedure Laterality Date     PARTIAL GASTRECTOMY       TENDON REPAIR      left pinky finger         CURRENT MEDICATIONS:      Current Facility-Administered Medications:      [COMPLETED] 0.9% sodium chloride BOLUS, 1,000 mL, Intravenous, Once, Stopped at 05/11/22 0910 **FOLLOWED BY** [COMPLETED] 0.9% sodium chloride BOLUS, 1,000 mL, Intravenous, Once, Stopped at 05/11/22 0754 **FOLLOWED BY** sodium chloride 0.9% infusion, , Intravenous, Continuous, SovelZhen henderson MD, Last Rate: 125 mL/hr at 05/11/22 0910, New Bag at 05/11/22 0910    Current Outpatient Medications:      levETIRAcetam (KEPPRA) 500 MG tablet, Take 1 tablet (500 mg) by mouth 2 times daily, Disp: 60 tablet, Rfl: 3     meclizine (ANTIVERT) 25 MG tablet, Take 1 tablet (25 mg) by mouth 3 times daily as needed for dizziness, Disp: 10 tablet, Rfl: 0     methadone (DOLOPHINE-INTENSOL) 10 MG/ML (HIGH CONC) solution, Take 170 mg by mouth daily , Disp: , Rfl:      ondansetron (ZOFRAN-ODT) 4 MG ODT tab, Take 1 tablet (4 mg) by mouth every 6 hours as needed for nausea, Disp: 10 tablet, Rfl: 0    ALLERGIES:  No Known Allergies    FAMILY HISTORY:  Family History   Problem Relation Age of Onset     Heart Disease Mother      Hypertension Mother      Hyperlipidemia Mother      Anesthesia Reaction Mother         behavioral changes, no personal reaction     Diabetes  "Father      Hypertension Father      Hyperlipidemia Father      LUNG DISEASE Father      No Known Problems Brother      Diabetes Maternal Grandmother      Cerebrovascular Disease Maternal Grandmother      Deep Vein Thrombosis Maternal Grandmother      No Known Problems Brother      No Known Problems Brother      Colon Cancer No family hx of      Prostate Cancer No family hx of        SOCIAL HISTORY:   Social History     Socioeconomic History     Marital status:      Spouse name: None     Number of children: None     Years of education: None     Highest education level: None   Tobacco Use     Smoking status: Former Smoker     Packs/day: 1.00     Quit date: 2019     Years since quitting: 3.3     Smokeless tobacco: Never Used   Substance and Sexual Activity     Alcohol use: Not Currently     Drug use: Yes     Comment: Methadone       VITALS:  Patient Vitals for the past 24 hrs:   BP Temp Temp src Pulse Resp SpO2 Height Weight   05/11/22 0915 137/88 -- -- 69 18 98 % -- --   05/11/22 0745 132/81 -- -- 72 22 100 % -- --   05/11/22 0700 134/82 -- -- 79 10 97 % -- --   05/11/22 0645 133/84 -- -- 95 18 99 % -- --   05/11/22 0629 (!) 146/88 98.7  F (37.1  C) Oral 87 18 98 % 1.803 m (5' 11\") 72.6 kg (160 lb)        PHYSICAL EXAM    Constitutional:  Awake, alert, in minimal distress  HENT:  Normocephalic, Atraumatic. Bilateral external ears normal. Oropharynx moist. Nose normal. Neck- Normal range of motion with no guarding, No midline cervical tenderness, Supple, No stridor.   Eyes:  PERRL, EOMI with no signs of entrapment, Conjunctiva normal, No discharge.   Respiratory:  Normal breath sounds, No respiratory distress, No wheezing.    Cardiovascular:  Normal heart rate, Normal rhythm, No appreciable rubs or gallops.   GI:  Soft, No tenderness, No distension, No palpable masses  Musculoskeletal:  Intact distal pulses, No edema. Good range of motion in all major joints. No tenderness to palpation or major deformities " noted.  Integument:  Warm, Dry, No erythema, No rash.   Neurologic:  Alert & oriented, Normal motor function, Normal sensory function, No focal deficits noted.   Psychiatric:  Affect normal, Judgment normal, Mood normal.     LAB:  All pertinent labs reviewed and interpreted.  Results for orders placed or performed during the hospital encounter of 05/11/22   CT Abdomen Pelvis w Contrast     Status: None    Narrative    EXAM: CT ABDOMEN PELVIS W CONTRAST  LOCATION: [facility]  DATE/TIME: 5/11/2022 8:08 AM    INDICATION: Abdominal pain, acute, nonlocalized  COMPARISON: None.  TECHNIQUE: CT scan of the abdomen and pelvis was performed following injection of IV contrast. Multiplanar reformats were obtained. Dose reduction techniques were used.  CONTRAST: IsoVue 370 100mL    FINDINGS:   LOWER CHEST: Normal.    HEPATOBILIARY: The hepatic veins are not yet opacified. There is mild intra and extrahepatic biliary dilatation. There is some gallbladder distention. No calcified gallstones. No liver lesion.    PANCREAS: Normal.    SPLEEN: At the upper limits for normal in size.    ADRENAL GLANDS: Normal.    KIDNEYS/BLADDER: Normal.    BOWEL: No free air. The stomach is decompressed. Postsurgical change in the epigastric region. The small bowel is normal in caliber without signs of inflammation. The appendix is filled with previously administered oral contrast and/or there are large   appendicoliths but there is no periappendiceal inflammation. There is a small stool burden. Diverticulosis of the sigmoid colon. No signs of diverticulitis.    LYMPH NODES: Normal.    VASCULATURE: Unremarkable.    PELVIC ORGANS: Negative.    MUSCULOSKELETAL: There is mild chronic wedging of a lower thoracic vertebral body mild degenerative change of the spine and pelvic joints.      Impression    IMPRESSION:   1.  Distended gallbladder with mild intra and extra hepatic biliary dilatation. Please correlate clinically and  consider either gallbladder ultrasound or MRCP if warranted.  2.  Small stool burden without signs of obstruction, perforation or abscess formation. Appendicoliths and/or retained barium within the appendix without periappendiceal inflammation.   Comprehensive metabolic panel     Status: Normal   Result Value Ref Range    Sodium 141 136 - 145 mmol/L    Potassium 3.5 3.5 - 5.0 mmol/L    Chloride 106 98 - 107 mmol/L    Carbon Dioxide (CO2) 25 22 - 31 mmol/L    Anion Gap 10 5 - 18 mmol/L    Urea Nitrogen 12 8 - 22 mg/dL    Creatinine 0.74 0.70 - 1.30 mg/dL    Calcium 8.8 8.5 - 10.5 mg/dL    Glucose 100 70 - 125 mg/dL    Alkaline Phosphatase 101 45 - 120 U/L    AST 21 0 - 40 U/L    ALT 26 0 - 45 U/L    Protein Total 6.5 6.0 - 8.0 g/dL    Albumin 3.5 3.5 - 5.0 g/dL    Bilirubin Total 0.3 0.0 - 1.0 mg/dL    GFR Estimate >90 >60 mL/min/1.73m2   CBC (+ platelets, no diff)     Status: Abnormal   Result Value Ref Range    WBC Count 6.2 4.0 - 11.0 10e3/uL    RBC Count 4.53 4.40 - 5.90 10e6/uL    Hemoglobin 13.6 13.3 - 17.7 g/dL    Hematocrit 39.7 (L) 40.0 - 53.0 %    MCV 88 78 - 100 fL    MCH 30.0 26.5 - 33.0 pg    MCHC 34.3 31.5 - 36.5 g/dL    RDW 11.7 10.0 - 15.0 %    Platelet Count 246 150 - 450 10e3/uL     RADIOLOGY:  Reviewed all pertinent imaging. Please see official radiology report.  CT Abdomen Pelvis w Contrast   Final Result   IMPRESSION:    1.  Distended gallbladder with mild intra and extra hepatic biliary dilatation. Please correlate clinically and consider either gallbladder ultrasound or MRCP if warranted.   2.  Small stool burden without signs of obstruction, perforation or abscess formation. Appendicoliths and/or retained barium within the appendix without periappendiceal inflammation.      US Abdomen Limited (RUQ)    (Results Pending)             I, Leigha Morris, am serving as a scribe to document services personally performed by Zhen Escalona MD, based on my observation and the provider's statements to me.  I, Zhen Escalona MD attest that Leigha Morris is acting in a scribe capacity, has observed my performance of the services and has documented them in accordance with my direction.    Zhen Escalona M.D.  Emergency Medicine  [facility] EMERGENCY DEPARTMENT     Zhen Escalona MD  05/11/22 1117       Zhen Escalona MD  05/11/22 1121

## 2022-05-11 NOTE — Clinical Note
Matt Ramirez was seen and treated in our emergency department on 5/11/2022.         Sincerely,     [facility] Emergency Department

## 2022-05-11 NOTE — ED NOTES
Pt discharged and walked out into the hallway, wife standing there and upset that pt is being discharged.  came out to speak to her about pt. She will be driving pt home

## 2022-05-13 LAB
ATRIAL RATE - MUSE: 79 BPM
DIASTOLIC BLOOD PRESSURE - MUSE: 74 MMHG
INTERPRETATION ECG - MUSE: NORMAL
P AXIS - MUSE: 78 DEGREES
PR INTERVAL - MUSE: 150 MS
QRS DURATION - MUSE: 96 MS
QT - MUSE: 402 MS
QTC - MUSE: 460 MS
R AXIS - MUSE: 71 DEGREES
SYSTOLIC BLOOD PRESSURE - MUSE: 126 MMHG
T AXIS - MUSE: 76 DEGREES
VENTRICULAR RATE- MUSE: 79 BPM

## 2022-08-03 ENCOUNTER — APPOINTMENT (OUTPATIENT)
Dept: ULTRASOUND IMAGING | Facility: HOSPITAL | Age: 37
End: 2022-08-03
Attending: EMERGENCY MEDICINE
Payer: COMMERCIAL

## 2022-08-03 ENCOUNTER — HOSPITAL ENCOUNTER (OUTPATIENT)
Facility: HOSPITAL | Age: 37
Setting detail: OBSERVATION
Discharge: LEFT BEFORE DISCHARGE | End: 2022-08-04
Attending: EMERGENCY MEDICINE | Admitting: EMERGENCY MEDICINE
Payer: COMMERCIAL

## 2022-08-03 DIAGNOSIS — R11.2 NAUSEA AND VOMITING: ICD-10-CM

## 2022-08-03 DIAGNOSIS — K83.8 DILATION OF BILIARY TRACT: ICD-10-CM

## 2022-08-03 LAB
ALBUMIN SERPL-MCNC: 4.1 G/DL (ref 3.5–5)
ALP SERPL-CCNC: 140 U/L (ref 45–120)
ALT SERPL W P-5'-P-CCNC: 15 U/L (ref 0–45)
ANION GAP SERPL CALCULATED.3IONS-SCNC: 9 MMOL/L (ref 5–18)
AST SERPL W P-5'-P-CCNC: 20 U/L (ref 0–40)
BILIRUB DIRECT SERPL-MCNC: 0.1 MG/DL
BILIRUB SERPL-MCNC: 0.3 MG/DL (ref 0–1)
BUN SERPL-MCNC: 13 MG/DL (ref 8–22)
CALCIUM SERPL-MCNC: 10 MG/DL (ref 8.5–10.5)
CHLORIDE BLD-SCNC: 101 MMOL/L (ref 98–107)
CO2 SERPL-SCNC: 27 MMOL/L (ref 22–31)
CREAT SERPL-MCNC: 0.86 MG/DL (ref 0.7–1.3)
ERYTHROCYTE [DISTWIDTH] IN BLOOD BY AUTOMATED COUNT: 12.5 % (ref 10–15)
GFR SERPL CREATININE-BSD FRML MDRD: >90 ML/MIN/1.73M2
GLUCOSE BLD-MCNC: 119 MG/DL (ref 70–125)
HCT VFR BLD AUTO: 44.9 % (ref 40–53)
HGB BLD-MCNC: 15.4 G/DL (ref 13.3–17.7)
LIPASE SERPL-CCNC: 20 U/L (ref 0–52)
MCH RBC QN AUTO: 29.4 PG (ref 26.5–33)
MCHC RBC AUTO-ENTMCNC: 34.3 G/DL (ref 31.5–36.5)
MCV RBC AUTO: 86 FL (ref 78–100)
PLATELET # BLD AUTO: 322 10E3/UL (ref 150–450)
POTASSIUM BLD-SCNC: 3.6 MMOL/L (ref 3.5–5)
PROT SERPL-MCNC: 8.1 G/DL (ref 6–8)
RBC # BLD AUTO: 5.23 10E6/UL (ref 4.4–5.9)
SODIUM SERPL-SCNC: 137 MMOL/L (ref 136–145)
WBC # BLD AUTO: 7.9 10E3/UL (ref 4–11)

## 2022-08-03 PROCEDURE — 250N000009 HC RX 250: Performed by: EMERGENCY MEDICINE

## 2022-08-03 PROCEDURE — 250N000011 HC RX IP 250 OP 636: Performed by: EMERGENCY MEDICINE

## 2022-08-03 PROCEDURE — 76705 ECHO EXAM OF ABDOMEN: CPT

## 2022-08-03 PROCEDURE — 82248 BILIRUBIN DIRECT: CPT | Performed by: EMERGENCY MEDICINE

## 2022-08-03 PROCEDURE — 99220 PR INITIAL OBSERVATION CARE,LEVEL III: CPT | Performed by: HOSPITALIST

## 2022-08-03 PROCEDURE — 250N000013 HC RX MED GY IP 250 OP 250 PS 637: Performed by: EMERGENCY MEDICINE

## 2022-08-03 PROCEDURE — 82310 ASSAY OF CALCIUM: CPT | Performed by: EMERGENCY MEDICINE

## 2022-08-03 PROCEDURE — 258N000003 HC RX IP 258 OP 636: Performed by: EMERGENCY MEDICINE

## 2022-08-03 PROCEDURE — 96375 TX/PRO/DX INJ NEW DRUG ADDON: CPT | Mod: XU

## 2022-08-03 PROCEDURE — 80177 DRUG SCRN QUAN LEVETIRACETAM: CPT | Performed by: EMERGENCY MEDICINE

## 2022-08-03 PROCEDURE — 83690 ASSAY OF LIPASE: CPT | Performed by: EMERGENCY MEDICINE

## 2022-08-03 PROCEDURE — C9803 HOPD COVID-19 SPEC COLLECT: HCPCS

## 2022-08-03 PROCEDURE — G0378 HOSPITAL OBSERVATION PER HR: HCPCS

## 2022-08-03 PROCEDURE — 99285 EMERGENCY DEPT VISIT HI MDM: CPT | Mod: 25

## 2022-08-03 PROCEDURE — 87635 SARS-COV-2 COVID-19 AMP PRB: CPT | Performed by: EMERGENCY MEDICINE

## 2022-08-03 PROCEDURE — 36415 COLL VENOUS BLD VENIPUNCTURE: CPT | Performed by: EMERGENCY MEDICINE

## 2022-08-03 PROCEDURE — 85027 COMPLETE CBC AUTOMATED: CPT | Performed by: EMERGENCY MEDICINE

## 2022-08-03 RX ORDER — ONDANSETRON 2 MG/ML
8 INJECTION INTRAMUSCULAR; INTRAVENOUS ONCE
Status: COMPLETED | OUTPATIENT
Start: 2022-08-03 | End: 2022-08-03

## 2022-08-03 RX ORDER — METHADONE HYDROCHLORIDE 10 MG/ML
85 CONCENTRATE ORAL ONCE
Status: COMPLETED | OUTPATIENT
Start: 2022-08-03 | End: 2022-08-03

## 2022-08-03 RX ADMIN — METHADONE HYDROCHLORIDE 85 MG: 10 CONCENTRATE ORAL at 22:38

## 2022-08-03 RX ADMIN — ONDANSETRON 8 MG: 2 INJECTION INTRAMUSCULAR; INTRAVENOUS at 19:44

## 2022-08-03 RX ADMIN — FAMOTIDINE 20 MG: 10 INJECTION INTRAVENOUS at 19:48

## 2022-08-03 RX ADMIN — LIDOCAINE HYDROCHLORIDE 30 ML: 20 SOLUTION ORAL; TOPICAL at 19:47

## 2022-08-03 RX ADMIN — SODIUM CHLORIDE 1000 ML: 9 INJECTION, SOLUTION INTRAVENOUS at 23:47

## 2022-08-03 ASSESSMENT — ENCOUNTER SYMPTOMS
SHORTNESS OF BREATH: 0
FEVER: 0
VOMITING: 1
DIARRHEA: 1
CHILLS: 1
BLOOD IN STOOL: 0
NERVOUS/ANXIOUS: 1
NAUSEA: 1
COUGH: 0
ABDOMINAL PAIN: 1

## 2022-08-03 NOTE — ED NOTES
"ED Provider In Triage Note  [facility]  Encounter Date: Aug 3, 2022    Chief Complaint   Patient presents with     Abdominal Pain       Brief HPI:   Matt Ramirez is a 36 year old male presenting to the Emergency Department with a chief complaint of abdominal pain. Epigastric pain. Started on 3 days ago. Assx vomiting. Diarrhea.    Has pending outpatient ulcer repair and gallbladder surgery. He has episodes where he \"cant keep anything down.\"     Normally takes 170 mg liquid methadone daily.     Brief Physical Exam:  BP (!) 136/95   Pulse 106   Temp 99.3  F (37.4  C) (Temporal)   Resp 16   Ht 1.829 m (6')   Wt 72.6 kg (160 lb)   SpO2 96%   BMI 21.70 kg/m    General: Non-toxic appearing  HEENT: Atraumatic  Resp: No respiratory distress  Abdomen: Non-peritoneal  Neuro: Alert, oriented, answers questions appropriately  Psych: Behavior appropriate    Plan Initiated in Triage:  Ddx includes: gastritis, PUD, biliary colic    PIT Dispo:   Return to lobby while awaiting workup and ED bed availability    Johnny Powell MD on 8/3/2022 at 6:47 PM    Patient was evaluated by the Physician in Triage due to a limitation of available rooms in the Emergency Department. A plan of care was discussed based on the information obtained on the initial evaluation and patient was consuled to return back to the Emergency Department lobby after this initial evalutaiton until results were obtained or a room became available in the Emergency Department. Patient was counseled not to leave prior to receiving the results of their workup.     Johnny Powell MD  [facility] EMERGENCY DEPARTMENT  [address] [place] [id number] [phone]       Johnny Powell MD  08/03/22 5313    "

## 2022-08-03 NOTE — ED TRIAGE NOTES
Pt presents with epigastric pain that started on Sunday. Told he needs his gallbladder removed. Hx of stomach ulcers and on methadone (due for methadone clinic at 0600 tomorrow). Pt has had n/v/d today. No fever, some chills     Triage Assessment     Row Name 08/03/22 6880       Triage Assessment (Adult)    Airway WDL WDL       Respiratory WDL    Respiratory WDL WDL       Skin Circulation/Temperature WDL    Skin Circulation/Temperature WDL WDL       Cardiac WDL    Cardiac WDL WDL       Peripheral/Neurovascular WDL    Peripheral Neurovascular WDL WDL       Cognitive/Neuro/Behavioral WDL    Cognitive/Neuro/Behavioral WDL WDL            Hx of seizures, withdrawal seizures

## 2022-08-04 ENCOUNTER — APPOINTMENT (OUTPATIENT)
Dept: MRI IMAGING | Facility: HOSPITAL | Age: 37
End: 2022-08-04
Attending: INTERNAL MEDICINE
Payer: COMMERCIAL

## 2022-08-04 VITALS
BODY MASS INDEX: 21.67 KG/M2 | OXYGEN SATURATION: 99 % | RESPIRATION RATE: 18 BRPM | HEART RATE: 63 BPM | SYSTOLIC BLOOD PRESSURE: 106 MMHG | HEIGHT: 72 IN | DIASTOLIC BLOOD PRESSURE: 66 MMHG | TEMPERATURE: 97.8 F | WEIGHT: 160 LBS

## 2022-08-04 LAB
ALBUMIN SERPL-MCNC: 3.7 G/DL (ref 3.5–5)
ALP SERPL-CCNC: 112 U/L (ref 45–120)
ALT SERPL W P-5'-P-CCNC: 11 U/L (ref 0–45)
ANION GAP SERPL CALCULATED.3IONS-SCNC: 6 MMOL/L (ref 5–18)
AST SERPL W P-5'-P-CCNC: 16 U/L (ref 0–40)
BASOPHILS # BLD AUTO: 0 10E3/UL (ref 0–0.2)
BASOPHILS NFR BLD AUTO: 1 %
BILIRUB SERPL-MCNC: 0.6 MG/DL (ref 0–1)
BUN SERPL-MCNC: 12 MG/DL (ref 8–22)
CALCIUM SERPL-MCNC: 9 MG/DL (ref 8.5–10.5)
CHLORIDE BLD-SCNC: 110 MMOL/L (ref 98–107)
CO2 SERPL-SCNC: 26 MMOL/L (ref 22–31)
CREAT SERPL-MCNC: 0.8 MG/DL (ref 0.7–1.3)
EOSINOPHIL # BLD AUTO: 0.1 10E3/UL (ref 0–0.7)
EOSINOPHIL NFR BLD AUTO: 2 %
ERYTHROCYTE [DISTWIDTH] IN BLOOD BY AUTOMATED COUNT: 12.7 % (ref 10–15)
GFR SERPL CREATININE-BSD FRML MDRD: >90 ML/MIN/1.73M2
GLUCOSE BLD-MCNC: 85 MG/DL (ref 70–125)
HCT VFR BLD AUTO: 40.2 % (ref 40–53)
HGB BLD-MCNC: 13.2 G/DL (ref 13.3–17.7)
IMM GRANULOCYTES # BLD: 0 10E3/UL
IMM GRANULOCYTES NFR BLD: 0 %
LEVETIRACETAM SERPL-MCNC: 22 ΜG/ML (ref 10–40)
LYMPHOCYTES # BLD AUTO: 1.8 10E3/UL (ref 0.8–5.3)
LYMPHOCYTES NFR BLD AUTO: 31 %
MCH RBC QN AUTO: 29.3 PG (ref 26.5–33)
MCHC RBC AUTO-ENTMCNC: 32.8 G/DL (ref 31.5–36.5)
MCV RBC AUTO: 89 FL (ref 78–100)
MONOCYTES # BLD AUTO: 0.5 10E3/UL (ref 0–1.3)
MONOCYTES NFR BLD AUTO: 9 %
NEUTROPHILS # BLD AUTO: 3.3 10E3/UL (ref 1.6–8.3)
NEUTROPHILS NFR BLD AUTO: 57 %
NRBC # BLD AUTO: 0 10E3/UL
NRBC BLD AUTO-RTO: 0 /100
PLATELET # BLD AUTO: 269 10E3/UL (ref 150–450)
POTASSIUM BLD-SCNC: 4.3 MMOL/L (ref 3.5–5)
PROT SERPL-MCNC: 6.8 G/DL (ref 6–8)
RBC # BLD AUTO: 4.51 10E6/UL (ref 4.4–5.9)
SARS-COV-2 RNA RESP QL NAA+PROBE: POSITIVE
SODIUM SERPL-SCNC: 142 MMOL/L (ref 136–145)
WBC # BLD AUTO: 5.7 10E3/UL (ref 4–11)

## 2022-08-04 PROCEDURE — C9113 INJ PANTOPRAZOLE SODIUM, VIA: HCPCS | Performed by: HOSPITALIST

## 2022-08-04 PROCEDURE — 74183 MRI ABD W/O CNTR FLWD CNTR: CPT

## 2022-08-04 PROCEDURE — 96361 HYDRATE IV INFUSION ADD-ON: CPT

## 2022-08-04 PROCEDURE — A9585 GADOBUTROL INJECTION: HCPCS | Performed by: EMERGENCY MEDICINE

## 2022-08-04 PROCEDURE — 80051 ELECTROLYTE PANEL: CPT | Performed by: HOSPITALIST

## 2022-08-04 PROCEDURE — G0378 HOSPITAL OBSERVATION PER HR: HCPCS

## 2022-08-04 PROCEDURE — 250N000013 HC RX MED GY IP 250 OP 250 PS 637: Performed by: CLINICAL NURSE SPECIALIST

## 2022-08-04 PROCEDURE — 99226 PR SUBSEQUENT OBSERVATION CARE,LEVEL III: CPT | Performed by: EMERGENCY MEDICINE

## 2022-08-04 PROCEDURE — 84155 ASSAY OF PROTEIN SERUM: CPT | Performed by: HOSPITALIST

## 2022-08-04 PROCEDURE — 258N000003 HC RX IP 258 OP 636: Performed by: HOSPITALIST

## 2022-08-04 PROCEDURE — 255N000002 HC RX 255 OP 636: Performed by: EMERGENCY MEDICINE

## 2022-08-04 PROCEDURE — 99203 OFFICE O/P NEW LOW 30 MIN: CPT | Performed by: SPECIALIST

## 2022-08-04 PROCEDURE — 96375 TX/PRO/DX INJ NEW DRUG ADDON: CPT | Mod: XU

## 2022-08-04 PROCEDURE — 36415 COLL VENOUS BLD VENIPUNCTURE: CPT | Performed by: HOSPITALIST

## 2022-08-04 PROCEDURE — 96365 THER/PROPH/DIAG IV INF INIT: CPT

## 2022-08-04 PROCEDURE — 250N000011 HC RX IP 250 OP 636: Performed by: CLINICAL NURSE SPECIALIST

## 2022-08-04 PROCEDURE — 85004 AUTOMATED DIFF WBC COUNT: CPT | Performed by: HOSPITALIST

## 2022-08-04 PROCEDURE — 250N000013 HC RX MED GY IP 250 OP 250 PS 637: Performed by: HOSPITALIST

## 2022-08-04 PROCEDURE — 250N000011 HC RX IP 250 OP 636: Performed by: HOSPITALIST

## 2022-08-04 PROCEDURE — 96376 TX/PRO/DX INJ SAME DRUG ADON: CPT

## 2022-08-04 RX ORDER — SODIUM CHLORIDE AND POTASSIUM CHLORIDE 150; 900 MG/100ML; MG/100ML
INJECTION, SOLUTION INTRAVENOUS CONTINUOUS
Status: DISCONTINUED | OUTPATIENT
Start: 2022-08-04 | End: 2022-08-04 | Stop reason: HOSPADM

## 2022-08-04 RX ORDER — GADOBUTROL 604.72 MG/ML
8 INJECTION INTRAVENOUS ONCE
Status: COMPLETED | OUTPATIENT
Start: 2022-08-04 | End: 2022-08-04

## 2022-08-04 RX ORDER — ACETAMINOPHEN 650 MG/1
650 SUPPOSITORY RECTAL EVERY 6 HOURS PRN
Status: DISCONTINUED | OUTPATIENT
Start: 2022-08-04 | End: 2022-08-04 | Stop reason: HOSPADM

## 2022-08-04 RX ORDER — MAGNESIUM HYDROXIDE/ALUMINUM HYDROXICE/SIMETHICONE 120; 1200; 1200 MG/30ML; MG/30ML; MG/30ML
30 SUSPENSION ORAL EVERY 4 HOURS PRN
Status: DISCONTINUED | OUTPATIENT
Start: 2022-08-04 | End: 2022-08-04 | Stop reason: HOSPADM

## 2022-08-04 RX ORDER — PROCHLORPERAZINE MALEATE 10 MG
10 TABLET ORAL EVERY 6 HOURS PRN
Status: DISCONTINUED | OUTPATIENT
Start: 2022-08-04 | End: 2022-08-04 | Stop reason: HOSPADM

## 2022-08-04 RX ORDER — ONDANSETRON 2 MG/ML
4 INJECTION INTRAMUSCULAR; INTRAVENOUS EVERY 6 HOURS PRN
Status: DISCONTINUED | OUTPATIENT
Start: 2022-08-04 | End: 2022-08-04 | Stop reason: HOSPADM

## 2022-08-04 RX ORDER — LEVETIRACETAM 500 MG/1
500 TABLET ORAL DAILY
Status: CANCELLED | OUTPATIENT
Start: 2022-08-04

## 2022-08-04 RX ORDER — OMEPRAZOLE 40 MG/1
40 CAPSULE, DELAYED RELEASE ORAL 2 TIMES DAILY
COMMUNITY

## 2022-08-04 RX ORDER — NICOTINE 21 MG/24HR
1 PATCH, TRANSDERMAL 24 HOURS TRANSDERMAL DAILY
Status: DISCONTINUED | OUTPATIENT
Start: 2022-08-04 | End: 2022-08-04 | Stop reason: HOSPADM

## 2022-08-04 RX ORDER — METHADONE HYDROCHLORIDE 10 MG/ML
170 CONCENTRATE ORAL DAILY
Status: CANCELLED | OUTPATIENT
Start: 2022-08-04

## 2022-08-04 RX ORDER — MECLIZINE HCL 12.5 MG 12.5 MG/1
25 TABLET ORAL 3 TIMES DAILY PRN
Status: CANCELLED | OUTPATIENT
Start: 2022-08-04

## 2022-08-04 RX ORDER — PANTOPRAZOLE SODIUM 40 MG/1
40 TABLET, DELAYED RELEASE ORAL
Status: DISCONTINUED | OUTPATIENT
Start: 2022-08-04 | End: 2022-08-04

## 2022-08-04 RX ORDER — ONDANSETRON 4 MG/1
4 TABLET, ORALLY DISINTEGRATING ORAL EVERY 6 HOURS PRN
Status: CANCELLED | OUTPATIENT
Start: 2022-08-04

## 2022-08-04 RX ORDER — METHADONE HYDROCHLORIDE 10 MG/ML
170 CONCENTRATE ORAL DAILY
Status: DISCONTINUED | OUTPATIENT
Start: 2022-08-04 | End: 2022-08-04 | Stop reason: HOSPADM

## 2022-08-04 RX ORDER — ONDANSETRON 4 MG/1
4 TABLET, ORALLY DISINTEGRATING ORAL EVERY 6 HOURS PRN
Status: DISCONTINUED | OUTPATIENT
Start: 2022-08-04 | End: 2022-08-04 | Stop reason: HOSPADM

## 2022-08-04 RX ORDER — LEVETIRACETAM 500 MG/1
500 TABLET ORAL DAILY
COMMUNITY

## 2022-08-04 RX ORDER — LANOLIN ALCOHOL/MO/W.PET/CERES
3 CREAM (GRAM) TOPICAL
Status: DISCONTINUED | OUTPATIENT
Start: 2022-08-04 | End: 2022-08-04 | Stop reason: HOSPADM

## 2022-08-04 RX ORDER — ONDANSETRON 2 MG/ML
4 INJECTION INTRAMUSCULAR; INTRAVENOUS ONCE
Status: COMPLETED | OUTPATIENT
Start: 2022-08-04 | End: 2022-08-04

## 2022-08-04 RX ORDER — ACETAMINOPHEN 325 MG/1
650 TABLET ORAL EVERY 6 HOURS PRN
Status: DISCONTINUED | OUTPATIENT
Start: 2022-08-04 | End: 2022-08-04 | Stop reason: HOSPADM

## 2022-08-04 RX ORDER — GABAPENTIN 300 MG/1
600 CAPSULE ORAL 2 TIMES DAILY
COMMUNITY

## 2022-08-04 RX ORDER — TESTOSTERONE CYPIONATE 200 MG/ML
200 INJECTION, SOLUTION INTRAMUSCULAR
COMMUNITY

## 2022-08-04 RX ORDER — GABAPENTIN 300 MG/1
600 CAPSULE ORAL 2 TIMES DAILY
Status: DISCONTINUED | OUTPATIENT
Start: 2022-08-04 | End: 2022-08-04 | Stop reason: HOSPADM

## 2022-08-04 RX ORDER — PROCHLORPERAZINE 25 MG
25 SUPPOSITORY, RECTAL RECTAL EVERY 12 HOURS PRN
Status: DISCONTINUED | OUTPATIENT
Start: 2022-08-04 | End: 2022-08-04 | Stop reason: HOSPADM

## 2022-08-04 RX ADMIN — PANTOPRAZOLE SODIUM 40 MG: 40 INJECTION, POWDER, FOR SOLUTION INTRAVENOUS at 09:25

## 2022-08-04 RX ADMIN — ONDANSETRON 4 MG: 2 INJECTION INTRAMUSCULAR; INTRAVENOUS at 12:31

## 2022-08-04 RX ADMIN — POTASSIUM CHLORIDE AND SODIUM CHLORIDE: 900; 150 INJECTION, SOLUTION INTRAVENOUS at 00:45

## 2022-08-04 RX ADMIN — POTASSIUM CHLORIDE AND SODIUM CHLORIDE: 900; 150 INJECTION, SOLUTION INTRAVENOUS at 10:40

## 2022-08-04 RX ADMIN — METHADONE HYDROCHLORIDE 170 MG: 10 CONCENTRATE ORAL at 12:37

## 2022-08-04 RX ADMIN — LEVETIRACETAM 500 MG: 100 INJECTION, SOLUTION INTRAVENOUS at 01:11

## 2022-08-04 RX ADMIN — NICOTINE 1 PATCH: 21 PATCH, EXTENDED RELEASE TRANSDERMAL at 00:47

## 2022-08-04 RX ADMIN — GADOBUTROL 8 ML: 604.72 INJECTION INTRAVENOUS at 15:47

## 2022-08-04 RX ADMIN — LEVETIRACETAM 500 MG: 100 INJECTION, SOLUTION INTRAVENOUS at 14:07

## 2022-08-04 ASSESSMENT — ACTIVITIES OF DAILY LIVING (ADL): DEPENDENT_IADLS:: INDEPENDENT

## 2022-08-04 NOTE — PROGRESS NOTES
"[facility] ACUTE PAIN SERVICE    ([facility], [facility], [facility])   Consult Note    Date of Admission:  8/3/2022  Date of Consult: 08/04/22  Physician requesting consult: Dr. Desouza  Reason for consult: pain, on metahdone     Assessment/Plan:     Matt Ramirez is a 36 year old male who was admitted on 8/3/2022. I was asked to see the patient for pain in setting of methadone dependence. Admitted for evaluation of epigastric abdominal pain with associated nausea, vomiting and diarrhea. 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 The patient is a former smoker - however nicotine patch placed so likely still dependent, and has chemical dependency history to prescription oxycodone - now on methadone maintanance.  Patient reports he has been on this for \"years\", no new change to medications. Dose verified by pharmacy, takes 170 mg of methadone at 0600 every morning. Last picked up dose on 6/28 - took 6 take home doses home. Pt reports he last kept down his dose on Monday, on Tuesday he states he threw up minutes after taking his dose, yesterday, Wednesday came to ED and received a half dose yesterday evening, was able to keep it down after receiving zofran. This morning his dose was verified. Opioid w/drawal can be complicating symptoms of gastritis, recommend full dose this morning. Will order zofran x 1 to help him keep it down.  Per Surgery pt with upper GI bleed, cholelithiasis, dilated cbd - GI consulted - elective lap altaf as an outpatient once GI issues resolve. Diet is NPO except for meds. Pain in abdomen. RASS +1 during assessment, fidgeting legs while sitting in chair, slumped over, holding head in hands. Stating \"if I don't get my methadone i'll be jumping all over the walls\" but not aggressive or demanding, calm " susana.     Discussed plan with RN, Dr. Desouza, Le Yip, pain team aprn.     PLAN:   1) Pain is consistent with epigastric abdominal pain - per surgery pt with upper GI bleed, cholelithiasis, dilated cbd - GI consulted. Cannot exclude opioid w/drawal complicating symptoms.   2)Multimodal Medication Therapy  Topical: none for now  NSAID'S:none d/t GI bleed  Muscle Relaxants:none  Adjuvants: APAP prn, resume home gabapentin 600 mg po BID per med rec  Antidepressants/anxiolytics:none  Opioids: home methadone 170 mg po daily per med rec  IV Pain medication: none  3)Non-medication interventions  Pharmacy consult- if agreeable  Acupuncture consult- if agreeable  Integrative consult - called referral to [phone]   4)Constipation Prophylaxis  Per GI/Surgery - on none, so recommend adding a bowel regimen if agreeable  5) Follow up   -Opioid prescriber has been [facility]  -Discharge Recommendations - We recommend prescribing the following at the time of discharge: TBD, no opioids      [place]  pulled from system on 08/04/22. Last refill on 7/21/2022 for gabapentin 300 mg #120 (30 day supply) from [facility]. This indicated no opioid use- just chronic gabapentin usage       History of Present Illness (HPI):       Matt Ramirez is a 36 year old old male with PMH as above. Patient is opioid tolerant, on methadone maintance, history for OUD with prescription oxycodone.  Opioid induced side effects noted, including: addiction    Review of medical record/Summary of labs and care everywhere.     Home pain meds: methadone 170 mg po daily for OUD      Imaging   Abdomen US, limited (RUQ only)    Result Date: 8/3/2022  EXAM: US ABDOMEN LIMITED LOCATION: [facility] DATE/TIME: 8/3/2022 8:40 PM INDICATION: Abdominal pain. COMPARISON: Ultrasound 5/11/2022 TECHNIQUE: Limited abdominal ultrasound. FINDINGS: GALLBLADDER: Small amount of bladder sludge, less pronounced than 5/11/2022 exam.  No wall thickening nor pericholecystic fluid. BILE DUCTS: Mildly dilated common bile duct measuring up to 10 mm overall similar. LIVER: Normal parenchyma with smooth contour. No focal mass. RIGHT KIDNEY: No hydronephrosis. PANCREAS: The visualized portions are normal. No ascites.     IMPRESSION: 1.  Small amount of gallbladder sludge, overall decreased compared with 5/11/2022. No cholecystitis. 2.  Extra hepatic bile duct dilatation measuring up to 10 mm. Consider MRCP for further characterization, given patient's symptoms.           Clarissa Adler, PharmD, BCPS, CPE  Acute Care Pain Management Program  [facility] ([place], [place])   With questions call [phone]  Preference if for [employer] Paging - [place]gg  Click HERE to page Le

## 2022-08-04 NOTE — CONSULTS
[facility] consult     Name: Matt Ramirez    Medical Record #: [id number]    YOB: 1985    Date/Time: 8/4/2022/10:14 AM    Reason for Consultation: Tone Desouza MD has asked me to evaluate Matt Ramirez regarding epigastric pain, nausea vomiting, hematemesis, diarrhea.    HPI: The patient is a 36 years old male with history of gastric surgery for duodenal stenosis and October 2020 in [place], migraines, seizure disorder, peptic ulcer disease, anxiety, depression, opiate dependence on methadone.  He gives history of epigastric pain along with nausea vomiting predating his surgery.  He has had recurrence of the symptoms off and on for the last 2 years.  His symptoms stayed with him for a day and sometimes up to a week.  He has been taking aspirin.  He has noted small amount of streaks of blood with the vomitus no shobha hematemesis.  No melena, no bright red blood per rectum.  He underwent EGD within the last 2 or 3 weeks and was supposed to have a follow-up appointment with his surgeon to discuss the results.  He also had a discussion about his gallbladder issues.  He gives history of loose stools since Sunday.  No recent antibiotics, no sick contacts.    He smokes 1.5 packs of cigarettes per day but denies alcohol or drugs.     Review of Systems (ROS): Complete ROS otherwise negative except for as above.    Past Medical History:  Past Medical History:   Diagnosis Date     Duodenal stenosis      Gastroesophageal reflux disease      Generalized anxiety disorder with panic attacks      Methadone use      Microcytic anemia      Peptic ulcer disease        Medications:   (Not in a hospital admission)      Current Facility-Administered Medications:      0.9% sodium chloride + KCl 20 mEq/L infusion, , Intravenous, Continuous, Cecil Lopez MD, Last Rate: 100 mL/hr at 08/04/22 0905, Rate Verify at 08/04/22 0905     acetaminophen (TYLENOL) tablet 650 mg, 650 mg, Oral, Q6H PRN  **OR** acetaminophen (TYLENOL) Suppository 650 mg, 650 mg, Rectal, Q6H PRN, Cecil Lopez MD     alum & mag hydroxide-simethicone (MAALOX) suspension 30 mL, 30 mL, Oral, Q4H PRN, Cecil Lopez MD     levETIRAcetam (KEPPRA) 500 mg in sodium chloride 0.9 % 100 mL intermittent infusion, 500 mg, Intravenous, Q12H, Cecil Lopez MD, Stopped at 08/04/22 0130     melatonin tablet 3 mg, 3 mg, Oral, At Bedtime PRN, Cecil Lopez MD     nicotine (NICODERM CQ) 21 MG/24HR 24 hr patch 1 patch, 1 patch, Transdermal, Daily, Cecil Lopez MD, 1 patch at 08/04/22 0047     nicotine Patch in Place, , Transdermal, Q8H, Cecil Lopez MD     ondansetron (ZOFRAN ODT) ODT tab 4 mg, 4 mg, Oral, Q6H PRN **OR** ondansetron (ZOFRAN) injection 4 mg, 4 mg, Intravenous, Q6H PRN, Cecil Lopez MD     pantoprazole (PROTONIX) IV push injection 40 mg, 40 mg, Intravenous, Daily, Cecil Lopez MD, 40 mg at 08/04/22 0925     prochlorperazine (COMPAZINE) injection 10 mg, 10 mg, Intravenous, Q6H PRN **OR** prochlorperazine (COMPAZINE) tablet 10 mg, 10 mg, Oral, Q6H PRN **OR** prochlorperazine (COMPAZINE) suppository 25 mg, 25 mg, Rectal, Q12H PRN, Cecil Lopez MD    Current Outpatient Medications:      levETIRAcetam (KEPPRA) 500 MG tablet, Take 500 mg by mouth daily, Disp: , Rfl:      meclizine (ANTIVERT) 25 MG tablet, Take 1 tablet (25 mg) by mouth 3 times daily as needed for dizziness, Disp: 10 tablet, Rfl: 0     methadone (DOLOPHINE-INTENSOL) 10 MG/ML (HIGH CONC) solution, Take 170 mg by mouth daily , Disp: , Rfl:      omeprazole (PRILOSEC) 40 MG DR capsule, Take 40 mg by mouth 2 times daily, Disp: , Rfl:      ondansetron (ZOFRAN-ODT) 4 MG ODT tab, Take 1 tablet (4 mg) by mouth every 6 hours as needed for nausea, Disp: 10 tablet, Rfl: 0     testosterone cypionate (DEPOTESTOSTERONE) 200 MG/ML injection, Inject 200 mg into the muscle every 14 days, Disp: , Rfl:        Allergies: Patient has no known  allergies.    Family History:  Family History   Problem Relation Age of Onset     Heart Disease Mother      Hypertension Mother      Hyperlipidemia Mother      Anesthesia Reaction Mother         behavioral changes, no personal reaction     Diabetes Father      Hypertension Father      Hyperlipidemia Father      LUNG DISEASE Father      No Known Problems Brother      Diabetes Maternal Grandmother      Cerebrovascular Disease Maternal Grandmother      Deep Vein Thrombosis Maternal Grandmother      No Known Problems Brother      No Known Problems Brother      Colon Cancer No family hx of      Prostate Cancer No family hx of        Social History:  Social History     Socioeconomic History     Marital status:      Spouse name: Not on file     Number of children: Not on file     Years of education: Not on file     Highest education level: Not on file   Occupational History     Not on file   Tobacco Use     Smoking status: Former Smoker     Packs/day: 1.00     Quit date: 2019     Years since quitting: 3.5     Smokeless tobacco: Never Used   Substance and Sexual Activity     Alcohol use: Not Currently     Drug use: Yes     Comment: Methadone     Sexual activity: Not on file   Other Topics Concern     Not on file   Social History Narrative     Not on file     Social Determinants of Health     Financial Resource Strain: Not on file   Food Insecurity: Not on file   Transportation Needs: Not on file   Physical Activity: Not on file   Stress: Not on file   Social Connections: Not on file   Intimate Partner Violence: Not on file   Housing Stability: Not on file       Physical Exam: /81 (BP Location: Left arm)   Pulse 61   Temp 97.8  F (36.6  C) (Oral)   Resp 16   Ht 1.829 m (6')   Wt 72.6 kg (160 lb)   SpO2 98%   BMI 21.70 kg/m      General: No acute distress  Eyes: No scleral icterus or conjunctivitis  Oropharynx: Moist, no ulcers or lesions  Neck/Thyroid: No neck masses or thyromegaly  Pulmonary: Lungs are  clear to auscultation bilaterally  Cardiovascular: Regular, rate, rhythm   Gastrointestinal: Scar marks present, mildly tender in epigastrium, no rebound tenderness, no masses palpablee.  Skin: The patient is not jaundiced. No obvious rashes  Extremities: No pre-tibial edema, no clubbing.   Neurologic: Alert and oriented ×3 , non- focal, grossly intact.    Labs:    CBC RESULTS:   Recent Labs   Lab Test 08/03/22 1943   WBC 7.9   RBC 5.23   HGB 15.4   HCT 44.9   MCV 86   MCH 29.4   MCHC 34.3   RDW 12.5           CMP Results:   Recent Labs   Lab Test 08/03/22 1943      POTASSIUM 3.6   CHLORIDE 101   CO2 27   ANIONGAP 9      BUN 13   CR 0.86   BILITOTAL 0.3   ALKPHOS 140*   ALT 15   AST 20        INR Results: No results for input(s): INR in the last 51519 hours.       Radiology: Abdomen US, limited (RUQ only)    Result Date: 8/3/2022  EXAM: US ABDOMEN LIMITED LOCATION: [facility] DATE/TIME: 8/3/2022 8:40 PM INDICATION: Abdominal pain. COMPARISON: Ultrasound 5/11/2022 TECHNIQUE: Limited abdominal ultrasound. FINDINGS: GALLBLADDER: Small amount of bladder sludge, less pronounced than 5/11/2022 exam. No wall thickening nor pericholecystic fluid. BILE DUCTS: Mildly dilated common bile duct measuring up to 10 mm overall similar. LIVER: Normal parenchyma with smooth contour. No focal mass. RIGHT KIDNEY: No hydronephrosis. PANCREAS: The visualized portions are normal. No ascites.     IMPRESSION: 1.  Small amount of gallbladder sludge, overall decreased compared with 5/11/2022. No cholecystitis. 2.  Extra hepatic bile duct dilatation measuring up to 10 mm. Consider MRCP for further characterization, given patient's symptoms.        Impression: 36 years old male with history of gastric surgery for duodenal stenosis and October 2020 in [place], migraines, seizure disorder, peptic ulcer disease, anxiety, depression, opiate dependence on methadone.  #1.  Nausea ,vomiting with small  amount of blood and epigastric pain ; this has been a recurrent issue, could be from peptic ulcer disease given aspirin/Excedrin use/smoking.  .  Biliary issues could be a contributing factor.  #2.  Hematemesis.  Likely Anna-Olmedo tear, esophagitis or peptic ulcer disease could be other possibilities  #3.  Dilated CBD with alkaline phosphatase of 140.  This could be related to methadone use, other possibilities could be papillary stenosis, choledocholithiasis.  Surgery team has already seen the patient.  #4.  COVID-positive  #5.  Opioid dependence, tobacco use, migraines, seizures      Recommendation:   Agree with supportive care, IV PPI, antiemetics and IV hydration.  MRCP to evaluate the hepatobiliary area (ordered).  Follow labs.  The patient will be started on a liquid diet after MRCP.  If the MRCP shows choledocholithiasis then keep him n.p.o. after midnight.  Get records from recent EGD.  No EGD needed at this point.  GI team will follow.    Discussed with the hospitalist.    Approximately 30 minutes of total time was spent providing patient care, including patient evaluation,more than 50% was spent in reviewing documentation/test results, counselling and co-ordinationg care.                                              Francisco Acuna M.D.  Thank you for the opportunity to participate in the care of this patient.   Please feel free to call me with any questions or concerns.  Phone number [phone].

## 2022-08-04 NOTE — ED PROVIDER NOTES
"Emergency Department Midlevel Supervisory Note     I personally saw the patient and performed a substantive portion of the visit including all aspects of the medical decision making.    ED Course:  9:50 PM Naya Sotelo PA-C staffed patient with me. I agree with their assessment and plan of management, and I will see the patient.  10:07 PM I met with the patient to introduce myself, gather additional history, perform my initial exam, and discuss the plan. PPE: surgical mask and gloves    Brief HPI:     Matt Ramirez is a 36 year old male who presents for evaluation of epigastric pain.    The patient reports that for the last 5 days he has been having epigastric pain \"along my GI tract.\" He describes the pain as stretching in nature. He does have a history of peptic ulcer disease and states this pain feels similar to previous episodes. He has had multiple episodes of vomiting per day with some episodes of hematemesis. He also endorses watery stools and chills. He has been unable to keep down any liquids or his medications since the onset of symptoms.    I, Trini Varner, am serving as a scribe to document services personally performed by Brown Ortega MD, based on my observations and the provider's statements to me.   I, Dr. Banuelos, attest that Trini Varner was acting in a scribe capacity, has observed my performance of the services and has documented them in accordance with my direction.    Brief Physical Exam:  Constitutional:  Anxious  EYES: Conjunctivae clear  HENT:  Atraumatic, normocephalic  Respiratory:  Respirations even, unlabored, in no acute respiratory distress  Cardiovascular:  Regular rate and rhythm, good peripheral perfusion  GI: Soft, nondistended, nontender, no palpable masses, no rebound, no guarding   Musculoskeletal:  No edema. No cyanosis. Range of motion major extremities intact.    Integument: Warm, Dry, No erythema, No rash.   Neurologic:  Alert & oriented, no focal deficits noted  Psych: " Normal mood and affect     MDM:  Patient evaluated for abdominal pain.  He has a complicated GI history.  Patient was treated for pain.  IV fluid was ordered.  CT shows dilated common bile duct.  GI was consulted.  He will be admitted for GI consult and possible MRCP.  Patient was given a dose of his methadone       No diagnosis found.    Labs and Imaging:  Results for orders placed or performed during the hospital encounter of 08/03/22   Abdomen US, limited (RUQ only)    Impression    IMPRESSION:  1.  Small amount of gallbladder sludge, overall decreased compared with 5/11/2022. No cholecystitis.  2.  Extra hepatic bile duct dilatation measuring up to 10 mm. Consider MRCP for further characterization, given patient's symptoms.       CBC (+ platelets, no diff)   Result Value Ref Range    WBC Count 7.9 4.0 - 11.0 10e3/uL    RBC Count 5.23 4.40 - 5.90 10e6/uL    Hemoglobin 15.4 13.3 - 17.7 g/dL    Hematocrit 44.9 40.0 - 53.0 %    MCV 86 78 - 100 fL    MCH 29.4 26.5 - 33.0 pg    MCHC 34.3 31.5 - 36.5 g/dL    RDW 12.5 10.0 - 15.0 %    Platelet Count 322 150 - 450 10e3/uL   Basic metabolic panel   Result Value Ref Range    Sodium 137 136 - 145 mmol/L    Potassium 3.6 3.5 - 5.0 mmol/L    Chloride 101 98 - 107 mmol/L    Carbon Dioxide (CO2) 27 22 - 31 mmol/L    Anion Gap 9 5 - 18 mmol/L    Urea Nitrogen 13 8 - 22 mg/dL    Creatinine 0.86 0.70 - 1.30 mg/dL    Calcium 10.0 8.5 - 10.5 mg/dL    Glucose 119 70 - 125 mg/dL    GFR Estimate >90 >60 mL/min/1.73m2   Hepatic function panel   Result Value Ref Range    Bilirubin Total 0.3 0.0 - 1.0 mg/dL    Bilirubin Direct 0.1 <=0.5 mg/dL    Protein Total 8.1 (H) 6.0 - 8.0 g/dL    Albumin 4.1 3.5 - 5.0 g/dL    Alkaline Phosphatase 140 (H) 45 - 120 U/L    AST 20 0 - 40 U/L    ALT 15 0 - 45 U/L   Result Value Ref Range    Lipase 20 0 - 52 U/L     I have reviewed the relevant laboratory and radiology studies      Brown Banuelos MD  [facility] EMERGENCY  DEPARTMENT  [address] [place] [id number]  [phone]     Brown Banuelos MD  08/03/22 6149

## 2022-08-04 NOTE — PLAN OF CARE
"  Problem: Pain Acute  Goal: Acceptable Pain Control and Functional Ability  Outcome: Ongoing, Progressing   Goal Outcome Evaluation:  He received his scheduled methadone at 2240 last evening and rated his pain \"ok\" and \"tolerable\" tonight.  He is NPO.  He appeared to be comfortable sleeping inbetween cares/assessments.                    "

## 2022-08-04 NOTE — CONSULTS
I have examined this patient and the medical care has been evaluated and discussed with the note from Jin Kearney. The documentation has been reviewed. I agree with the medical care provided and confirm the findings.     ABD benign, soft, nondistended  EXT warm    Results for orders placed or performed during the hospital encounter of 08/03/22   Abdomen US, limited (RUQ only)     Status: None    Narrative    EXAM: US ABDOMEN LIMITED  LOCATION: [facility]  DATE/TIME: 8/3/2022 8:40 PM    INDICATION: Abdominal pain.  COMPARISON: Ultrasound 5/11/2022  TECHNIQUE: Limited abdominal ultrasound.    FINDINGS:    GALLBLADDER: Small amount of bladder sludge, less pronounced than 5/11/2022 exam. No wall thickening nor pericholecystic fluid.    BILE DUCTS: Mildly dilated common bile duct measuring up to 10 mm overall similar.    LIVER: Normal parenchyma with smooth contour. No focal mass.    RIGHT KIDNEY: No hydronephrosis.    PANCREAS: The visualized portions are normal.    No ascites.      Impression    IMPRESSION:  1.  Small amount of gallbladder sludge, overall decreased compared with 5/11/2022. No cholecystitis.  2.  Extra hepatic bile duct dilatation measuring up to 10 mm. Consider MRCP for further characterization, given patient's symptoms.       CBC (+ platelets, no diff)     Status: Normal   Result Value Ref Range    WBC Count 7.9 4.0 - 11.0 10e3/uL    RBC Count 5.23 4.40 - 5.90 10e6/uL    Hemoglobin 15.4 13.3 - 17.7 g/dL    Hematocrit 44.9 40.0 - 53.0 %    MCV 86 78 - 100 fL    MCH 29.4 26.5 - 33.0 pg    MCHC 34.3 31.5 - 36.5 g/dL    RDW 12.5 10.0 - 15.0 %    Platelet Count 322 150 - 450 10e3/uL   Basic metabolic panel     Status: Normal   Result Value Ref Range    Sodium 137 136 - 145 mmol/L    Potassium 3.6 3.5 - 5.0 mmol/L    Chloride 101 98 - 107 mmol/L    Carbon Dioxide (CO2) 27 22 - 31 mmol/L    Anion Gap 9 5 - 18 mmol/L    Urea Nitrogen 13 8 - 22 mg/dL    Creatinine 0.86 0.70 - 1.30 mg/dL     Calcium 10.0 8.5 - 10.5 mg/dL    Glucose 119 70 - 125 mg/dL    GFR Estimate >90 >60 mL/min/1.73m2   Hepatic function panel     Status: Abnormal   Result Value Ref Range    Bilirubin Total 0.3 0.0 - 1.0 mg/dL    Bilirubin Direct 0.1 <=0.5 mg/dL    Protein Total 8.1 (H) 6.0 - 8.0 g/dL    Albumin 4.1 3.5 - 5.0 g/dL    Alkaline Phosphatase 140 (H) 45 - 120 U/L    AST 20 0 - 40 U/L    ALT 15 0 - 45 U/L   Lipase     Status: Normal   Result Value Ref Range    Lipase 20 0 - 52 U/L   Keppra (Levetiracetam) Level     Status: Normal   Result Value Ref Range    Keppra (Levetiracetam) Level 22.0 10.0 - 40.0  g/mL   Asymptomatic COVID-19 Virus (Coronavirus) by PCR Nasopharyngeal     Status: Abnormal    Specimen: Nasopharyngeal; Swab   Result Value Ref Range    SARS CoV2 PCR Positive (A) Negative    Narrative    Testing was performed using the marian  SARS-CoV-2 & Influenza A/B Assay on the marian  Mary  System.  This test should be ordered for the detection of SARS-COV-2 in individuals who meet SARS-CoV-2 clinical and/or epidemiological criteria. Test performance is unknown in asymptomatic patients.  This test is for in vitro diagnostic use under the FDA EUA for laboratories certified under CLIA to perform moderate and/or high complexity testing. This test has not been FDA cleared or approved.  A negative test does not rule out the presence of PCR inhibitors in the specimen or target RNA in concentration below the limit of detection for the assay. The possibility of a false negative should be considered if the patient's recent exposure or clinical presentation suggests COVID-19.  [facility] are certified under the Clinical Laboratory Improvement Amendments of 1988 (CLIA-88) as qualified to perform moderate and/or high complexity laboratory testing.   Comprehensive metabolic panel     Status: Abnormal   Result Value Ref Range    Sodium 142 136 - 145 mmol/L    Potassium 4.3 3.5 - 5.0 mmol/L    Chloride 110 (H)  98 - 107 mmol/L    Carbon Dioxide (CO2) 26 22 - 31 mmol/L    Anion Gap 6 5 - 18 mmol/L    Urea Nitrogen 12 8 - 22 mg/dL    Creatinine 0.80 0.70 - 1.30 mg/dL    Calcium 9.0 8.5 - 10.5 mg/dL    Glucose 85 70 - 125 mg/dL    Alkaline Phosphatase 112 45 - 120 U/L    AST 16 0 - 40 U/L    ALT 11 0 - 45 U/L    Protein Total 6.8 6.0 - 8.0 g/dL    Albumin 3.7 3.5 - 5.0 g/dL    Bilirubin Total 0.6 0.0 - 1.0 mg/dL    GFR Estimate >90 >60 mL/min/1.73m2   CBC with platelets and differential     Status: Abnormal   Result Value Ref Range    WBC Count 5.7 4.0 - 11.0 10e3/uL    RBC Count 4.51 4.40 - 5.90 10e6/uL    Hemoglobin 13.2 (L) 13.3 - 17.7 g/dL    Hematocrit 40.2 40.0 - 53.0 %    MCV 89 78 - 100 fL    MCH 29.3 26.5 - 33.0 pg    MCHC 32.8 31.5 - 36.5 g/dL    RDW 12.7 10.0 - 15.0 %    Platelet Count 269 150 - 450 10e3/uL    % Neutrophils 57 %    % Lymphocytes 31 %    % Monocytes 9 %    % Eosinophils 2 %    % Basophils 1 %    % Immature Granulocytes 0 %    NRBCs per 100 WBC 0 <1 /100    Absolute Neutrophils 3.3 1.6 - 8.3 10e3/uL    Absolute Lymphocytes 1.8 0.8 - 5.3 10e3/uL    Absolute Monocytes 0.5 0.0 - 1.3 10e3/uL    Absolute Eosinophils 0.1 0.0 - 0.7 10e3/uL    Absolute Basophils 0.0 0.0 - 0.2 10e3/uL    Absolute Immature Granulocytes 0.0 <=0.4 10e3/uL    Absolute NRBCs 0.0 10e3/uL   CBC with platelets differential     Status: Abnormal    Narrative    The following orders were created for panel order CBC with platelets differential.  Procedure                               Abnormality         Status                     ---------                               -----------         ------                     CBC with platelets and d...[[id number]]  Abnormal            Final result                 Please view results for these tests on the individual orders.     A/P:   upper gi bleed  Cholelithiasis  Dilated cbd    Gi consulted  Instructed not to take asa or anti-inflammatorie  Workup per gi  Elective lap altaf as an outpatient  once gi issues resolved.  Discussed and answered questions        Trell Head MD  General Surgery [phone]  Vascular Surgery [phone]

## 2022-08-04 NOTE — PHARMACY-ADMISSION MEDICATION HISTORY
Pharmacy Note - Admission Medication History    Pertinent Provider Information: Methadone dose verified with [facility]. Dose 170mg. Received dose in clinic on 7/28 and 6 take home doses.        ______________________________________________________________________    Prior To Admission (PTA) med list completed and updated in EMR.       PTA Med List   Medication Sig Note Last Dose     gabapentin (NEURONTIN) 300 MG capsule Take 600 mg by mouth 2 times daily  Past Week at Unknown time     levETIRAcetam (KEPPRA) 500 MG tablet Take 500 mg by mouth daily  Past Week at Unknown time     meclizine (ANTIVERT) 25 MG tablet Take 1 tablet (25 mg) by mouth 3 times daily as needed for dizziness  Unknown at Unknown time     methadone (DOLOPHINE-INTENSOL) 10 MG/ML (HIGH CONC) solution Take 170 mg by mouth daily  8/4/2022: Dose verified with [facility] [phone] on 8/4/22. Patient last dose in clinic on 7/28/22 and received 6 take home doses at that time. Current dose is 170mg  8/3/2022 at Unknown time     omeprazole (PRILOSEC) 40 MG DR capsule Take 40 mg by mouth 2 times daily  8/3/2022 at Unknown time     ondansetron (ZOFRAN-ODT) 4 MG ODT tab Take 1 tablet (4 mg) by mouth every 6 hours as needed for nausea  Unknown at Unknown time       Information source(s): Patient, Clinic records and [facility]  Method of interview communication: phone    Summary of Changes to PTA Med List  New: omeprazole, gabapentin  Discontinued: none  Changed: keppra frequency    Patient was asked about OTC/herbal products specifically.  PTA med list reflects this.    In the past week, patient estimated taking medication this percent of the time:  greater than 90%.    Allergies were reviewed, assessed, and updated with the patient.      Patient does not use any multi-dose medications prior to admission.    The information provided in this note is only as accurate as the sources available at the  time of the update(s).    Thank you for the opportunity to participate in the care of this patient.    Roseline Sánchez, [facility]  8/4/2022 2:06 PM

## 2022-08-04 NOTE — ED NOTES
[facility] ED Handoff Report    ED Chief Complaint: nausea, vomiting    ED Diagnosis:  (R11.2) Nausea and vomiting    (K83.8) Dilation of biliary tract       PMH:    Past Medical History:   Diagnosis Date     Duodenal stenosis      Gastroesophageal reflux disease      Generalized anxiety disorder with panic attacks      Methadone use      Microcytic anemia      Peptic ulcer disease         Code Status:  Full Code     Falls Risk: No Band: Not applicable    Current Living Situation/Residence: lives with a significant other     Elimination Status: Continent: Yes     Activity Level: Independent    Patients Preferred Language:  English     Needed: No    Vital Signs:  /79   Pulse 71   Temp 97.8  F (36.6  C) (Oral)   Resp 16   Ht 1.829 m (6')   Wt 72.6 kg (160 lb)   SpO2 99%   BMI 21.70 kg/m       Cardiac Rhythm: NA    Pain Score: 5/10    Is the Patient Confused:  No    Last Food or Drink: 8/2/22     Focused Assessment:  Alert & oriented. Independent. Having diarrhea. Nausea intermittently. C/o 5/10 abdominal pain; tolerable. NPO.  Had dental surgery last Friday.  Is followed by Methadone clinic. Smokes 1.5 packs/day, has nicotine patch on. Had MRI done and then will need MRCP done per GI.  Needs stool sample still.    Tests Performed: Done: Labs and Imaging    Treatments Provided:  IVF. IV Keppra. Oral methadone.     Family Dynamics/Concerns: No    Family Updated On Visitor Policy: No    Plan of Care Communicated to Family: No    Who Was Updated about Plan of Care: patient can update self    Belongings Checklist Done and Signed by Patient: No    Medications sent with patient: NA    Covid: asymptomatic , positive    Additional Information: Hx seizures.     RN: Jillian Orellana RN   8/4/2022 4:12 PM

## 2022-08-04 NOTE — CONSULTS
General Surgery Consultation  Matt Ramirez MRN# [id number]   Age/Sex: 36 year old male YOB: 1985     Reason for consult: 1. Nausea and vomiting    2. Dilation of biliary tract            Requesting physician: Dr Lopez                   Assessment and Plan:   Assessment:  Epigastric abdominal pain with no evidence of cholecystitis   Mildly dilated CBD 10 mm without elevated LFTs suggesting choledocholithiasis  Chronic pain with taking aspirin every 4 hours  History of surgery 2 years ago for a duodenal stricture  Chronic gastritis and is unclear if it was a gastrojejunostomy which most likely is clean surgery looking at CT versus partial gastrectomy and prior jejunostomy that is in chart.  Unable to find information of exact surgery in chart and patient unable to tell us what he had done    Plan:  -No surgical plans for general surgery team.  Due to his ongoing  And hemoptysis should have GI consulted-I placed a consult.  -Discussed with patient the importance to stop his aspirin immediately and never use that again at this time  -Continue high dose PPI  -Diet can be started once seen by GI and diet per GI at this time  -Tobacco cessation recommended  -Would recommend that he work on the GI portion as I do believe that this is the main reason why he is having pain and not his gallbladder.  We will sign off at this time.  Recommended the patient once he is better and this is all resolved he can always consult with us as an outpatient for elective cholecystectomy.  -Thank you for consulting us and feel free to call us if any questions or concerns.          Chief Complaint:     Chief Complaint   Patient presents with     Abdominal Pain        History is obtained from the patient    HPI:   Matt Ramirez is a 36 year old male history of duodenal stenosis, GERD, gastritis with PUD, chronic pain with opioid dependence and currently on methadone, who presents with ongoing abdominal pain, nausea  vomiting and diarrhea.  Patient discussed that 2 years ago he had a open surgery for which is not exactly sure what was done.  He has in his chart that he had a partial gastrectomy which she does not know.  States that since his surgery is done okay except for about the past 9 months he has had increase in pain.  Pain comes and goes and limits present it is quite severe.  Pain is not aggravated with eating or drinking.  Pain is always epigastric without radiation.  He feels like something is stuck or moving.  He takes aspirin caplets every 4 hours.  He denies any alcohol intake or other drug use.  He currently is taking methadone for his opioid addiction.  He states methadone is helpful to his pain relief but then the pain just comes back.  Currently he denies any fever or chills.  On Monday he was vomiting and had flecks of blood in it.  He has vomited since but no blood.  Denies any dark black stools.  Has had a low appetite and not eating much.  Also having ongoing diarrhea that is watery.  Last endoscopy showed that he had an ulcer remaining and is on high-dose PPIs.  He is due for follow-up on Wednesday with GI.  In the past evaluations he has had a distended gallbladder with sludge and without cholelithiasis and a mildly dilated CBD at 10 mm.  Upon evaluation in the emergency room he had a normal white count.  Hemoglobin 15.4 yesterday and today is 13.2 most likely dilutional.  Normal LFTs.  Unremarkable labs.  Right upper quadrant ultrasound shows mild distention of gallbladder with and sludge without any cholelithiasis, pericolic fluid or gallbladder wall thickening.  Mild dilation of CBD noted again.          Past Medical History:     Past Medical History:   Diagnosis Date     Duodenal stenosis      Gastroesophageal reflux disease      Generalized anxiety disorder with panic attacks      Methadone use      Microcytic anemia      Peptic ulcer disease               Past Surgical History:     Past Surgical  History:   Procedure Laterality Date     PARTIAL GASTRECTOMY       TENDON REPAIR      left pinky finger             Social History:    reports that he quit smoking about 3 years ago. He smoked 1.00 pack per day. He has never used smokeless tobacco. He reports previous alcohol use. He reports current drug use.           Family History:     Family History   Problem Relation Age of Onset     Heart Disease Mother      Hypertension Mother      Hyperlipidemia Mother      Anesthesia Reaction Mother         behavioral changes, no personal reaction     Diabetes Father      Hypertension Father      Hyperlipidemia Father      LUNG DISEASE Father      No Known Problems Brother      Diabetes Maternal Grandmother      Cerebrovascular Disease Maternal Grandmother      Deep Vein Thrombosis Maternal Grandmother      No Known Problems Brother      No Known Problems Brother      Colon Cancer No family hx of      Prostate Cancer No family hx of               Allergies:   No Known Allergies           Medications:     Prior to Admission medications    Medication Sig Start Date End Date Taking? Authorizing Provider   levETIRAcetam (KEPPRA) 500 MG tablet Take 500 mg by mouth daily   Yes Unknown, Entered By History   meclizine (ANTIVERT) 25 MG tablet Take 1 tablet (25 mg) by mouth 3 times daily as needed for dizziness 5/9/22  Yes Gertrudis Marques MD   methadone (DOLOPHINE-INTENSOL) 10 MG/ML (HIGH CONC) solution Take 170 mg by mouth daily    Yes Reported, Patient   omeprazole (PRILOSEC) 40 MG DR capsule Take 40 mg by mouth 2 times daily   Yes Unknown, Entered By History   ondansetron (ZOFRAN-ODT) 4 MG ODT tab Take 1 tablet (4 mg) by mouth every 6 hours as needed for nausea 2/28/22  Yes Johnny Powell MD   testosterone cypionate (DEPOTESTOSTERONE) 200 MG/ML injection Inject 200 mg into the muscle every 14 days    Unknown, Entered By History              Review of Systems:   The Review of Systems is negative other than noted in the HPI             Physical Exam:     Patient Vitals for the past 24 hrs:   BP Temp Temp src Pulse Resp SpO2 Height Weight   08/04/22 0900 115/70 -- -- 76 -- 98 % -- --   08/04/22 0441 116/81 97.8  F (36.6  C) Oral 61 16 98 % -- --   08/04/22 0100 -- 98.7  F (37.1  C) Oral 68 16 97 % -- --   08/03/22 2349 (!) 128/95 -- -- 72 -- 97 % -- --   08/03/22 2130 (!) 164/88 -- -- 99 -- 97 % -- --   08/03/22 1848 (!) 136/95 99.3  F (37.4  C) Temporal 106 16 96 % 1.829 m (6') 72.6 kg (160 lb)        No intake or output data in the 24 hours ending 08/04/22 1130   Constitutional:   awake, alert, cooperative, no apparent distress, and appears stated age       Eyes:   PERRL, conjunctiva/corneas clear, EOM's intact; no scleral edema or icterus noted        ENT:   Normocephalic, without obvious abnormality, atraumatic, Lips, mucosa, and tongue normal        Hematologic / Lymphatic:   No lymphadenopathy       Lungs:   Normal respiratory effort, no accessory muscle use       Cardiovascular:   Regular rate and rhythm       Abdomen:   Soft tenderness epigastric.  No right upper quadrant abdominal pain.       Musculoskeletal:   No obvious swelling, bruising or deformity       Skin:   Skin color and texture normal for patient, no rashes or lesions              Data:         All imaging studies reviewed by me.    Results for orders placed or performed during the hospital encounter of 08/03/22 (from the past 24 hour(s))   CBC (+ platelets, no diff)   Result Value Ref Range    WBC Count 7.9 4.0 - 11.0 10e3/uL    RBC Count 5.23 4.40 - 5.90 10e6/uL    Hemoglobin 15.4 13.3 - 17.7 g/dL    Hematocrit 44.9 40.0 - 53.0 %    MCV 86 78 - 100 fL    MCH 29.4 26.5 - 33.0 pg    MCHC 34.3 31.5 - 36.5 g/dL    RDW 12.5 10.0 - 15.0 %    Platelet Count 322 150 - 450 10e3/uL   Basic metabolic panel   Result Value Ref Range    Sodium 137 136 - 145 mmol/L    Potassium 3.6 3.5 - 5.0 mmol/L    Chloride 101 98 - 107 mmol/L    Carbon Dioxide (CO2) 27 22 - 31 mmol/L    Anion Gap 9  5 - 18 mmol/L    Urea Nitrogen 13 8 - 22 mg/dL    Creatinine 0.86 0.70 - 1.30 mg/dL    Calcium 10.0 8.5 - 10.5 mg/dL    Glucose 119 70 - 125 mg/dL    GFR Estimate >90 >60 mL/min/1.73m2   Hepatic function panel   Result Value Ref Range    Bilirubin Total 0.3 0.0 - 1.0 mg/dL    Bilirubin Direct 0.1 <=0.5 mg/dL    Protein Total 8.1 (H) 6.0 - 8.0 g/dL    Albumin 4.1 3.5 - 5.0 g/dL    Alkaline Phosphatase 140 (H) 45 - 120 U/L    AST 20 0 - 40 U/L    ALT 15 0 - 45 U/L   Lipase   Result Value Ref Range    Lipase 20 0 - 52 U/L   Keppra (Levetiracetam) Level   Result Value Ref Range    Keppra (Levetiracetam) Level 22.0 10.0 - 40.0  g/mL   Abdomen US, limited (RUQ only)    Narrative    EXAM: US ABDOMEN LIMITED  LOCATION: [facility]  DATE/TIME: 8/3/2022 8:40 PM    INDICATION: Abdominal pain.  COMPARISON: Ultrasound 5/11/2022  TECHNIQUE: Limited abdominal ultrasound.    FINDINGS:    GALLBLADDER: Small amount of bladder sludge, less pronounced than 5/11/2022 exam. No wall thickening nor pericholecystic fluid.    BILE DUCTS: Mildly dilated common bile duct measuring up to 10 mm overall similar.    LIVER: Normal parenchyma with smooth contour. No focal mass.    RIGHT KIDNEY: No hydronephrosis.    PANCREAS: The visualized portions are normal.    No ascites.      Impression    IMPRESSION:  1.  Small amount of gallbladder sludge, overall decreased compared with 5/11/2022. No cholecystitis.  2.  Extra hepatic bile duct dilatation measuring up to 10 mm. Consider MRCP for further characterization, given patient's symptoms.       Asymptomatic COVID-19 Virus (Coronavirus) by PCR Nasopharyngeal    Specimen: Nasopharyngeal; Swab   Result Value Ref Range    SARS CoV2 PCR Positive (A) Negative    Narrative    Testing was performed using the marian  SARS-CoV-2 & Influenza A/B Assay on the marian  Mary  System.  This test should be ordered for the detection of SARS-COV-2 in individuals who meet SARS-CoV-2 clinical and/or  epidemiological criteria. Test performance is unknown in asymptomatic patients.  This test is for in vitro diagnostic use under the FDA EUA for laboratories certified under CLIA to perform moderate and/or high complexity testing. This test has not been FDA cleared or approved.  A negative test does not rule out the presence of PCR inhibitors in the specimen or target RNA in concentration below the limit of detection for the assay. The possibility of a false negative should be considered if the patient's recent exposure or clinical presentation suggests COVID-19.  [facility] are certified under the Clinical Laboratory Improvement Amendments of 1988 (CLIA-88) as qualified to perform moderate and/or high complexity laboratory testing.   Comprehensive metabolic panel   Result Value Ref Range    Sodium 142 136 - 145 mmol/L    Potassium 4.3 3.5 - 5.0 mmol/L    Chloride 110 (H) 98 - 107 mmol/L    Carbon Dioxide (CO2) 26 22 - 31 mmol/L    Anion Gap 6 5 - 18 mmol/L    Urea Nitrogen 12 8 - 22 mg/dL    Creatinine 0.80 0.70 - 1.30 mg/dL    Calcium 9.0 8.5 - 10.5 mg/dL    Glucose 85 70 - 125 mg/dL    Alkaline Phosphatase 112 45 - 120 U/L    AST 16 0 - 40 U/L    ALT 11 0 - 45 U/L    Protein Total 6.8 6.0 - 8.0 g/dL    Albumin 3.7 3.5 - 5.0 g/dL    Bilirubin Total 0.6 0.0 - 1.0 mg/dL    GFR Estimate >90 >60 mL/min/1.73m2   CBC with platelets differential    Narrative    The following orders were created for panel order CBC with platelets differential.  Procedure                               Abnormality         Status                     ---------                               -----------         ------                     CBC with platelets and d...[[id number]]  Abnormal            Final result                 Please view results for these tests on the individual orders.   CBC with platelets and differential   Result Value Ref Range    WBC Count 5.7 4.0 - 11.0 10e3/uL    RBC Count 4.51 4.40 - 5.90 10e6/uL     Hemoglobin 13.2 (L) 13.3 - 17.7 g/dL    Hematocrit 40.2 40.0 - 53.0 %    MCV 89 78 - 100 fL    MCH 29.3 26.5 - 33.0 pg    MCHC 32.8 31.5 - 36.5 g/dL    RDW 12.7 10.0 - 15.0 %    Platelet Count 269 150 - 450 10e3/uL    % Neutrophils 57 %    % Lymphocytes 31 %    % Monocytes 9 %    % Eosinophils 2 %    % Basophils 1 %    % Immature Granulocytes 0 %    NRBCs per 100 WBC 0 <1 /100    Absolute Neutrophils 3.3 1.6 - 8.3 10e3/uL    Absolute Lymphocytes 1.8 0.8 - 5.3 10e3/uL    Absolute Monocytes 0.5 0.0 - 1.3 10e3/uL    Absolute Eosinophils 0.1 0.0 - 0.7 10e3/uL    Absolute Basophils 0.0 0.0 - 0.2 10e3/uL    Absolute Immature Granulocytes 0.0 <=0.4 10e3/uL    Absolute NRBCs 0.0 10e3/uL        MANUEL Bliss  [facility]  [address] [place] [id number]  Phone- [phone]  Fax- [phone]

## 2022-08-04 NOTE — H&P
[facility]    History and Physical - Hospitalist Service       Date of Admission:  8/3/2022    Assessment & Plan      Matt Ramirez is a 36 year old male admitted on 8/3/2022. He came for evaluation of epigastric abdominal pain with associated nausea, vomiting and diarrhea.    1.  Acute gastritis, probable biliary colic  Known history of gallbladder sludge and biliary dilatation  Abdominal ultrasound showed no cholecystitis, extrahepatic bile duct dilatation measuring up to 10 mm  Alk phos 140, will trend  Nothing by mouth  IV hydration  Supportive management  General surgery consult    2.  Hematemesis  Likely from [place]  Previous history of PUD  Patient uses aspirin and Excedrin for migraines frequently and he is also a smoker  Hemoglobin is stable but perhaps hemoconcentrated  Monitor hemoglobin    3.  Opioid dependence  Given methadone 85 mg in the ED  Pharmacy to reconcile methadone with clinic    4.  Tobacco abuse  Smokes 1.5 pack of cigarettes per day  Nicotine patch    5.  Migraines  Tylenol as needed  Minimize aspirin/NSAIDs given GI symptoms  Recommend neurology evaluation as outpatient    6.  Seizures  Keppra IV until able to take by mouth well       Diet: NPO per Anesthesia Guidelines for Procedure/Surgery Except for: Meds    DVT Prophylaxis: Ambulate every shift  Carlos Catheter: Not present  Central Lines: None  Cardiac Monitoring: None  Code Status: Full Code      Clinically Significant Risk Factors Present on Admission                          Disposition Plan         The patient's care was discussed with the Patient.    Cecil Lopez MD  Hospitalist Service  [facility]  Securely message with the Vocera Web Console (learn more here)  Text page via [employer] Paging/Directory         ______________________________________________________________________    Chief Complaint   Abdominal pain, nausea, vomiting, diarrhea, blood in the  vomit    History is obtained from the patient, electronic health record and emergency department physician    History of Present Illness   Matt Ramirez is a 36 year old male who came to the emergency department for evaluation of above chief complaint.  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  Patient reports intermittent GI symptoms since surgery almost 2 years ago.  He follows with a surgeon in [facility] and had an endoscopy about 2 weeks ago.  He has an appointment to follow-up results and set up a plan, but was told that he needed his gallbladder out.  He was seen in the ED on 5/11/2022 for similar symptoms and CT scan showed distended gallbladder with intra and extrahepatic biliary dilatation, abdominal ultrasound showed mildly distended gallbladder with some sludge.  Patient states that he has intermittent symptoms, however has had constant symptoms since 7/31/2022.  He reports streaks of blood in the vomit occasionally but no bloody stools.  He takes aspirin and Excedrin for migraines frequently.  He smokes 1.5 packs of cigarettes per day but denies alcohol or drugs.  He has not been able to keep much down over the last few days.  He reports epigastric and substernal burning sensation.    Review of Systems    The 10 point Review of Systems is negative other than noted in the HPI or here.     Past Medical History    I have reviewed this patient's medical history and updated it with pertinent information if needed.   Past Medical History:   Diagnosis Date     Duodenal stenosis      Gastroesophageal reflux disease      Generalized anxiety disorder with panic attacks      Methadone use      Microcytic anemia      Peptic ulcer disease        Past Surgical History   I have reviewed this patient's surgical history and  updated it with pertinent information if needed.  Past Surgical History:   Procedure Laterality Date     PARTIAL GASTRECTOMY       TENDON REPAIR      left pinky finger       Social History   I have reviewed this patient's social history and updated it with pertinent information if needed.  Social History     Tobacco Use     Smoking status: Former Smoker     Packs/day: 1.00     Quit date: 2019     Years since quitting: 3.5     Smokeless tobacco: Never Used   Substance Use Topics     Alcohol use: Not Currently     Drug use: Yes     Comment: Methadone       Family History   I have reviewed this patient's family history and updated it with pertinent information if needed.  Family History   Problem Relation Age of Onset     Heart Disease Mother      Hypertension Mother      Hyperlipidemia Mother      Anesthesia Reaction Mother         behavioral changes, no personal reaction     Diabetes Father      Hypertension Father      Hyperlipidemia Father      LUNG DISEASE Father      No Known Problems Brother      Diabetes Maternal Grandmother      Cerebrovascular Disease Maternal Grandmother      Deep Vein Thrombosis Maternal Grandmother      No Known Problems Brother      No Known Problems Brother      Colon Cancer No family hx of      Prostate Cancer No family hx of        Prior to Admission Medications   Prior to Admission Medications   Prescriptions Last Dose Informant Patient Reported? Taking?   levETIRAcetam (KEPPRA) 500 MG tablet   No No   Sig: Take 1 tablet (500 mg) by mouth 2 times daily   meclizine (ANTIVERT) 25 MG tablet   No No   Sig: Take 1 tablet (25 mg) by mouth 3 times daily as needed for dizziness   methadone (DOLOPHINE-INTENSOL) 10 MG/ML (HIGH CONC) solution   Yes No   Sig: Take 170 mg by mouth daily    ondansetron (ZOFRAN-ODT) 4 MG ODT tab   No No   Sig: Take 1 tablet (4 mg) by mouth every 6 hours as needed for nausea      Facility-Administered Medications: None     Allergies   No Known Allergies    Physical  Exam   Vital Signs: Temp: 99.3  F (37.4  C) Temp src: Temporal BP: (!) 128/95 Pulse: 72   Resp: 16 SpO2: 97 % O2 Device: None (Room air)    Weight: 160 lbs 0 oz    Constitutional: awake, alert and appears older than stated age  Respiratory: no increased work of breathing and good air exchange  Cardiovascular: regular rate and rhythm and normal S1 and S2  GI: normal bowel sounds, soft and tenderness noted in the epigastric region  Skin: no bruising or bleeding and no redness, warmth, or swelling  Musculoskeletal: no lower extremity pitting edema present  there is no redness, warmth, or swelling of the joints  Neurologic: Mental Status Exam:  Level of Alertness:   awake  Motor Exam:  moves all extremities well and symmetrically  Neuropsychiatric: General: normal and calm    Data   Data reviewed today: I reviewed all medications, new labs and imaging results over the last 24 hours. I personally reviewed no images or EKG's today.    Recent Labs   Lab 08/03/22 1943   WBC 7.9   HGB 15.4   MCV 86         POTASSIUM 3.6   CHLORIDE 101   CO2 27   BUN 13   CR 0.86   ANIONGAP 9   MAIRA 10.0      ALBUMIN 4.1   PROTTOTAL 8.1*   BILITOTAL 0.3   ALKPHOS 140*   ALT 15   AST 20   LIPASE 20     Recent Results (from the past 24 hour(s))   Abdomen US, limited (RUQ only)    Narrative    EXAM: US ABDOMEN LIMITED  LOCATION: [facility]  DATE/TIME: 8/3/2022 8:40 PM    INDICATION: Abdominal pain.  COMPARISON: Ultrasound 5/11/2022  TECHNIQUE: Limited abdominal ultrasound.    FINDINGS:    GALLBLADDER: Small amount of bladder sludge, less pronounced than 5/11/2022 exam. No wall thickening nor pericholecystic fluid.    BILE DUCTS: Mildly dilated common bile duct measuring up to 10 mm overall similar.    LIVER: Normal parenchyma with smooth contour. No focal mass.    RIGHT KIDNEY: No hydronephrosis.    PANCREAS: The visualized portions are normal.    No ascites.      Impression    IMPRESSION:  1.  Small  amount of gallbladder sludge, overall decreased compared with 5/11/2022. No cholecystitis.  2.  Extra hepatic bile duct dilatation measuring up to 10 mm. Consider MRCP for further characterization, given patient's symptoms.

## 2022-08-04 NOTE — ED PROVIDER NOTES
EMERGENCY DEPARTMENT ENCOUNTER      NAME: Matt Ramirez  AGE: 36 year old male  YOB: 1985  MRN: [id number]  EVALUATION DATE & TIME: 8/3/2022  8:55 PM    PCP: Aaron Hawley    ED PROVIDER: Naya Sotelo PA-C      Chief Complaint   Patient presents with     Abdominal Pain         FINAL IMPRESSION:  1. Nausea and vomiting    2. Dilation of biliary tract          ED COURSE & MEDICAL DECISION MAKING:    Pertinent Labs & Imaging studies reviewed. (See chart for details)    36 year old male presents to the Emergency Department for evaluation of epigastric pain and nausea/vomiting.    Physical exam is remarkable for an anxious appearing male.  He has epigastric tenderness to palpation, no rebound or guarding.  Heart and lung sounds are clear diffusely throughout.  Mildly dry mucous membranes.  Vital signs remarkable for tachycardia, otherwise stable and he is afebrile.    CBC is unremarkable with no leukocytosis or anemia.  BMP is unremarkable with no significant electrolyte derangements, normal kidney function.  Lipase within normal limits. Ultrasound of the right upper quadrant shows gallbladder sludge but improved when compared to previous, he does have extra hepatic biliary dilatation.    The patient was given IV Zofran and Pepcid for treatment of his symptoms with no further episodes of emesis here.  Patient states that he believes he is in methadone withdrawal as he has been vomiting his doses for the last 5 days, I did consult with pharmacy regarding appropriate dosing for him as I am concerned about overdose with his typical 170 mg dose if he has not had any in his system for 5 days-they recommend a half dose of 85 mg was given here.  I consulted GI given his extrahepatic biliary dilatation as he may require an MRCP.  Patient will be admitted for symptom control and likely MRCP, he is agreeable with this treatment plan.  Patient's care was discussed with staff physician Dr. Banuelos who is in  agreement with the treatment plan.    ED Course   9:41 PM Performed my initial history and physical exam. Discussed workup in the emergency department, management of symptoms, and likely disposition.   9:49 PM Staffed with Dr. Brown Banuelos  9:58 PM Discussed with Dr. Estrada with [facility].  10:07 PM Consulted pharmacy to discuss methadone dosing-will do half dose.  10:25 PM Discussed with Dr. Lopez, admitting MD.  10:29 PM Patient witnessed walking out of the ED, stating he wants to go smoke a cigarette.    At the conclusion of the encounter I discussed the results of all of the tests and the disposition. The questions were answered. The patient or family acknowledged understanding and was agreeable with the care plan.     Voice recognition software was used in the creation of this note. Any grammatical or nonsensical errors are due to inherent errors with the software and are not the intention of the writer.     MEDICATIONS GIVEN IN THE EMERGENCY:  Medications   melatonin tablet 3 mg (has no administration in time range)   ondansetron (ZOFRAN ODT) ODT tab 4 mg (has no administration in time range)     Or   ondansetron (ZOFRAN) injection 4 mg (has no administration in time range)   acetaminophen (TYLENOL) tablet 650 mg (has no administration in time range)     Or   acetaminophen (TYLENOL) Suppository 650 mg (has no administration in time range)   0.9% sodium chloride + KCl 20 mEq/L infusion ( Intravenous New Bag 8/4/22 0045)   prochlorperazine (COMPAZINE) injection 10 mg (has no administration in time range)     Or   prochlorperazine (COMPAZINE) tablet 10 mg (has no administration in time range)     Or   prochlorperazine (COMPAZINE) suppository 25 mg (has no administration in time range)   nicotine Patch in Place (has no administration in time range)   nicotine (NICODERM CQ) 21 MG/24HR 24 hr patch 1 patch (1 patch Transdermal Patch/Med Applied 8/4/22 0047)   alum & mag hydroxide-simethicone (MAALOX) suspension 30 mL (has  "no administration in time range)   pantoprazole (PROTONIX) IV push injection 40 mg (has no administration in time range)   levETIRAcetam (KEPPRA) 500 mg in sodium chloride 0.9 % 100 mL intermittent infusion (has no administration in time range)   famotidine (PEPCID) injection 20 mg (20 mg Intravenous Given 8/3/22 1948)   lidocaine (viscous) (XYLOCAINE) 2 % 15 mL, alum & mag hydroxide-simethicone (MAALOX) 15 mL GI Cocktail (30 mLs Oral Given 8/3/22 1947)   ondansetron (ZOFRAN) injection 8 mg (8 mg Intravenous Given 8/3/22 1944)   methadone (DOLOPHINE-INTENSOL) 10 MG/ML (HIGH CONC) solution 85 mg (85 mg Oral Given 8/3/22 2238)   0.9% sodium chloride BOLUS (0 mLs Intravenous Stopped 8/4/22 0048)       NEW PRESCRIPTIONS STARTED AT TODAY'S ER VISIT  New Prescriptions    No medications on file            =================================================================    HPI    Patient information was obtained from: Patient    Use of : N/A         Matt Ramirez is a 36 year old male with PMH of peptic ulcer disease, methadone maintenance therapy patient who presents to the ED for evaluation of epigastric pain.    The patient reports that for the last 5 days, he has been having epigastric pain \"along my GI tract.\"  He describes the pain as stretching in nature.  He does have a history of peptic ulcer disease and states this pain feels similar to previous episodes.  He has had multiple episodes of vomiting per day, some episodes of hematemesis.  He also endorses watery stools and chills.  He has been unable to keep down any liquids or his medications since the onset of symptoms.  He states that he sees a GI doctor in [facility] but did not seek care there today due to the distance from his house.    He denies any chest pain, difficulty breathing, fevers, black or bloody stools.      REVIEW OF SYSTEMS   Review of Systems   Constitutional: Positive for chills. Negative for fever.   Respiratory: Negative " for cough and shortness of breath.    Cardiovascular: Negative for chest pain.   Gastrointestinal: Positive for abdominal pain, diarrhea, nausea and vomiting. Negative for blood in stool.        Reports hematemesis   Psychiatric/Behavioral: The patient is nervous/anxious.        All other systems reviewed and are negative unless noted in HPI.      PAST MEDICAL HISTORY:  Past Medical History:   Diagnosis Date     Duodenal stenosis      Gastroesophageal reflux disease      Generalized anxiety disorder with panic attacks      Methadone use      Microcytic anemia      Peptic ulcer disease        PAST SURGICAL HISTORY:  Past Surgical History:   Procedure Laterality Date     PARTIAL GASTRECTOMY       TENDON REPAIR      left pinky finger       CURRENT MEDICATIONS:    levETIRAcetam (KEPPRA) 500 MG tablet  meclizine (ANTIVERT) 25 MG tablet  methadone (DOLOPHINE-INTENSOL) 10 MG/ML (HIGH CONC) solution  ondansetron (ZOFRAN-ODT) 4 MG ODT tab        ALLERGIES:  No Known Allergies    FAMILY HISTORY:  Family History   Problem Relation Age of Onset     Heart Disease Mother      Hypertension Mother      Hyperlipidemia Mother      Anesthesia Reaction Mother         behavioral changes, no personal reaction     Diabetes Father      Hypertension Father      Hyperlipidemia Father      LUNG DISEASE Father      No Known Problems Brother      Diabetes Maternal Grandmother      Cerebrovascular Disease Maternal Grandmother      Deep Vein Thrombosis Maternal Grandmother      No Known Problems Brother      No Known Problems Brother      Colon Cancer No family hx of      Prostate Cancer No family hx of        SOCIAL HISTORY:   Social History     Socioeconomic History     Marital status:    Tobacco Use     Smoking status: Former Smoker     Packs/day: 1.00     Quit date: 2019     Years since quitting: 3.5     Smokeless tobacco: Never Used   Substance and Sexual Activity     Alcohol use: Not Currently     Drug use: Yes     Comment: Methadone        VITALS:  Patient Vitals for the past 24 hrs:   BP Temp Temp src Pulse Resp SpO2 Height Weight   08/03/22 2349 (!) 128/95 -- -- 72 -- 97 % -- --   08/03/22 2130 (!) 164/88 -- -- 99 -- 97 % -- --   08/03/22 1848 (!) 136/95 99.3  F (37.4  C) Temporal 106 16 96 % 1.829 m (6') 72.6 kg (160 lb)       PHYSICAL EXAM    VITAL SIGNS: BP (!) 128/95   Pulse 72   Temp 99.3  F (37.4  C) (Temporal)   Resp 16   Ht 1.829 m (6')   Wt 72.6 kg (160 lb)   SpO2 97%   BMI 21.70 kg/m    General Appearance: Anxious appearing; Alert, cooperative, normal speech and facial symmetry, appears stated age  Head:  Normocephalic, without obvious abnormality, atraumatic  Eyes: Conjunctiva/corneas clear, EOM's intact, no nystagmus, PERRL  ENT: Mildly dry mucous membranes; lips, mucosa, and tongue normal; teeth and gums normal, no pharyngeal inflammation, no dysphonia or difficulty swallowing  Cardio:  Regular rate and rhythm, S1 and S2 normal, no murmur, rub    or gallop, 2+ pulses symmetric in all extremities  Pulm:  Clear to auscultation bilaterally, respirations unlabored with no accessory muscle use  Abdomen: Epigastric tenderness to palpation; active bowel sounds in all quadrants; abdomen is soft, non-distended with no rebound tenderness or guarding.   Skin:  Skin color appears normal; no rashes or lesions noted  Neuro: Patient is awake, alert, and responsive to voice. No gross motor weaknesses or sensory loss; moves all extremities.     LAB:  All pertinent labs reviewed and interpreted.  Labs Ordered and Resulted from Time of ED Arrival to Time of ED Departure   HEPATIC FUNCTION PANEL - Abnormal       Result Value    Bilirubin Total 0.3      Bilirubin Direct 0.1      Protein Total 8.1 (*)     Albumin 4.1      Alkaline Phosphatase 140 (*)     AST 20      ALT 15     CBC WITH PLATELETS - Normal    WBC Count 7.9      RBC Count 5.23      Hemoglobin 15.4      Hematocrit 44.9      MCV 86      MCH 29.4      MCHC 34.3      RDW 12.5       Platelet Count 322     BASIC METABOLIC PANEL - Normal    Sodium 137      Potassium 3.6      Chloride 101      Carbon Dioxide (CO2) 27      Anion Gap 9      Urea Nitrogen 13      Creatinine 0.86      Calcium 10.0      Glucose 119      GFR Estimate >90     LIPASE - Normal    Lipase 20     KEPPRA (LEVETIRACETAM) LEVEL   COVID-19 VIRUS (CORONAVIRUS) BY PCR   COMPREHENSIVE METABOLIC PANEL       RADIOLOGY:  Reviewed all pertinent imaging. Please see official radiology report.  Abdomen US, limited (RUQ only)   Final Result   IMPRESSION:   1.  Small amount of gallbladder sludge, overall decreased compared with 5/11/2022. No cholecystitis.   2.  Extra hepatic bile duct dilatation measuring up to 10 mm. Consider MRCP for further characterization, given patient's symptoms.                  Naya Sotelo PA-C  Emergency Medicine  [facility] EMERGENCY DEPARTMENT  [facility] [facility] [id number] [phone]  Dept: [phone]       Naya Sotelo PA-C  08/04/22 0051

## 2022-08-04 NOTE — CONSULTS
"Care Management Initial Consult    General Information  Assessment completed with: Spouse or significant other, Nakia wife via phone; COVID + pt and attempted room phone and not set up.  Type of CM/SW Visit: Initial Assessment    Primary Care Provider verified and updated as needed: Yes   Readmission within the last 30 days: no previous admission in last 30 days      Reason for Consult: discharge planning  Advance Care Planning: Advance Care Planning Reviewed: no concerns identified          Communication Assessment  Patient's communication style: spoken language (English or Bilingual)             Living Environment:   People in home: spouse, child(jose), dependent     Current living Arrangements: mobile home      Able to return to prior arrangements: yes       Family/Social Support:  Care provided by: self  Provides care for: child(jose) (\"kids age 8, 12, 15\")  Marital Status:   Wife  Nakia       Description of Support System: Supportive, Involved    Support Assessment: Adequate family and caregiver support, Adequate social supports, Patient communicates needs well met    Current Resources:   Patient receiving home care services: No     Community Resources: Other (see comment) ([facility] in [facility]. Picks up Methadone every Thursday.)  Equipment currently used at home: none  Supplies currently used at home: Other (\"glasses, but most often doesn't wear them\".)    Employment/Financial:  Employment Status: employed full-time     Employment/ Comments: \"no  benefits\"  Financial Concerns:     Referral to Financial Worker: No       Lifestyle & Psychosocial Needs:  Social Determinants of Health     Tobacco Use: Medium Risk     Smoking Tobacco Use: Former Smoker     Smokeless Tobacco Use: Never Used   Alcohol Use: Not on file   Financial Resource Strain: Not on file   Food Insecurity: Not on file   Transportation Needs: Not on file   Physical Activity: Not on file   Stress: Not on file "   Social Connections: Not on file   Intimate Partner Violence: Not on file   Depression: Not on file   Housing Stability: Not on file       Functional Status:  Prior to admission patient needed assistance:   Dependent ADLs:: Independent, Ambulation-no assistive device  Dependent IADLs:: Independent  Assesssment of Functional Status: At functional baseline    Mental Health Status:  Mental Health Status: Past Concern  Mental Health Management: Medication    Chemical Dependency Status:     Chemical Dependency Management: Methadone          Values/Beliefs:  Spiritual, Cultural Beliefs, Catholic Practices, Values that affect care:                 Additional Information:  Matt was found to be COVID +. He lives in a mobile home with his wife and kids age  8, 12, and 15.    He is independent with ADLs at baseline and drives and works full time.    Likely no discharge needs at this time. He goes to [facility] every Thursday for his weekly Methadone .    Wife to transport at discharge.    What is Observation was given.    Miri Velazco RN

## 2022-08-04 NOTE — PROGRESS NOTES
Daily Progress Note    Assessment/Plan:    Matt Ramirez is a 36 year old male admitted on 8/3/2022. He came for evaluation of epigastric abdominal pain with associated nausea, vomiting and diarrhea.     1.  Acute gastritis vs  biliary colic  Known history of gallbladder sludge and biliary dilatation  Abdominal ultrasound showed no cholecystitis, extrahepatic bile duct dilatation measuring up to 10 mm  Alk phos 140, will trend  GI consulted, MRCP ordered then may have clear liquids after.  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  Has been following as outpatient with a surgeon in [place] and states that he is supposed to have his gallbladder removed at some point.       2.  Hematemesis  Likely from [place]  Previous history of PUD  Patient uses aspirin and Excedrin for migraines frequently and he is also a smoker  Hemoglobin is stable but perhaps hemoconcentrated  Monitor hemoglobin  Aspirin will be discontinued, started on high-dose PPI.     3.  Opioid dependence  Methadone restarted and pain team following.  Due to vomiting he had not been able to keep it down.    4.  COVID-positive on admission August 3, 2022.  He is asymptomatic, he knows of no known COVID exposure.  No indication for COVID medications.     5.  Tobacco abuse  Smokes 1.5 pack of cigarettes per day  Nicotine patch     6.  Migraines  Tylenol as needed  Minimize aspirin/NSAIDs given GI symptoms  Recommend neurology evaluation as outpatient     7.  Seizures  Keppra IV until able to take by mouth well     Code status:Full Code        Barriers to Discharge: Hematemesis, bile duct dilatation    Disposition: Anticipate discharge in 1 to 2 days    Subjective:  Matt continues to have abdominal pain but it is controlled.  He was able to get his methadone as previously he was feeling withdrawal symptoms secondary to his inability to keep it down over the past couple days.  No chest pain or shortness of breath, no fevers or chills or  respiratory symptoms or body aches consistent with COVID.        Current Medications Reviewed via EHR List    Objective:  Vital signs in last 24 hours:  [unfilled]  .prog  Weight:   @THISENCWEIGHTS(1)@  Weight change:   Body mass index is 21.7 kg/m .    Intake/Output last 3 shifts:  No intake/output data recorded.  Intake/Output this shift:  No intake/output data recorded.    Physical Exam:  General: No apparent distress  CV: Regular rate and rhythm  Lungs: Clear to auscultation  Abdomen: Slight right upper quadrant tenderness        Imaging:  Personally Reviewed.  Abdomen US, limited (RUQ only)    Result Date: 8/3/2022  EXAM: US ABDOMEN LIMITED LOCATION: [facility] DATE/TIME: 8/3/2022 8:40 PM INDICATION: Abdominal pain. COMPARISON: Ultrasound 5/11/2022 TECHNIQUE: Limited abdominal ultrasound. FINDINGS: GALLBLADDER: Small amount of bladder sludge, less pronounced than 5/11/2022 exam. No wall thickening nor pericholecystic fluid. BILE DUCTS: Mildly dilated common bile duct measuring up to 10 mm overall similar. LIVER: Normal parenchyma with smooth contour. No focal mass. RIGHT KIDNEY: No hydronephrosis. PANCREAS: The visualized portions are normal. No ascites.     IMPRESSION: 1.  Small amount of gallbladder sludge, overall decreased compared with 5/11/2022. No cholecystitis. 2.  Extra hepatic bile duct dilatation measuring up to 10 mm. Consider MRCP for further characterization, given patient's symptoms.       Lab Results:  Personally Reviewed.   Fingerstick Blood Glucose: @[id number](POCGLUFGR:10)@    Last Hbg A1C: No results found for: HGBA1C   No results found for: INR, PROTIME  Recent Results (from the past 24 hour(s))   CBC (+ platelets, no diff)    Collection Time: 08/03/22  7:43 PM   Result Value Ref Range    WBC Count 7.9 4.0 - 11.0 10e3/uL    RBC Count 5.23 4.40 - 5.90 10e6/uL    Hemoglobin 15.4 13.3 - 17.7 g/dL    Hematocrit 44.9 40.0 - 53.0 %    MCV 86 78 - 100 fL    MCH 29.4  26.5 - 33.0 pg    MCHC 34.3 31.5 - 36.5 g/dL    RDW 12.5 10.0 - 15.0 %    Platelet Count 322 150 - 450 10e3/uL   Basic metabolic panel    Collection Time: 08/03/22  7:43 PM   Result Value Ref Range    Sodium 137 136 - 145 mmol/L    Potassium 3.6 3.5 - 5.0 mmol/L    Chloride 101 98 - 107 mmol/L    Carbon Dioxide (CO2) 27 22 - 31 mmol/L    Anion Gap 9 5 - 18 mmol/L    Urea Nitrogen 13 8 - 22 mg/dL    Creatinine 0.86 0.70 - 1.30 mg/dL    Calcium 10.0 8.5 - 10.5 mg/dL    Glucose 119 70 - 125 mg/dL    GFR Estimate >90 >60 mL/min/1.73m2   Hepatic function panel    Collection Time: 08/03/22  7:43 PM   Result Value Ref Range    Bilirubin Total 0.3 0.0 - 1.0 mg/dL    Bilirubin Direct 0.1 <=0.5 mg/dL    Protein Total 8.1 (H) 6.0 - 8.0 g/dL    Albumin 4.1 3.5 - 5.0 g/dL    Alkaline Phosphatase 140 (H) 45 - 120 U/L    AST 20 0 - 40 U/L    ALT 15 0 - 45 U/L   Lipase    Collection Time: 08/03/22  7:43 PM   Result Value Ref Range    Lipase 20 0 - 52 U/L   Keppra (Levetiracetam) Level    Collection Time: 08/03/22  7:43 PM   Result Value Ref Range    Keppra (Levetiracetam) Level 22.0 10.0 - 40.0  g/mL   Asymptomatic COVID-19 Virus (Coronavirus) by PCR Nasopharyngeal    Collection Time: 08/03/22 11:48 PM    Specimen: Nasopharyngeal; Swab   Result Value Ref Range    SARS CoV2 PCR Positive (A) Negative   Comprehensive metabolic panel    Collection Time: 08/04/22 10:09 AM   Result Value Ref Range    Sodium 142 136 - 145 mmol/L    Potassium 4.3 3.5 - 5.0 mmol/L    Chloride 110 (H) 98 - 107 mmol/L    Carbon Dioxide (CO2) 26 22 - 31 mmol/L    Anion Gap 6 5 - 18 mmol/L    Urea Nitrogen 12 8 - 22 mg/dL    Creatinine 0.80 0.70 - 1.30 mg/dL    Calcium 9.0 8.5 - 10.5 mg/dL    Glucose 85 70 - 125 mg/dL    Alkaline Phosphatase 112 45 - 120 U/L    AST 16 0 - 40 U/L    ALT 11 0 - 45 U/L    Protein Total 6.8 6.0 - 8.0 g/dL    Albumin 3.7 3.5 - 5.0 g/dL    Bilirubin Total 0.6 0.0 - 1.0 mg/dL    GFR Estimate >90 >60 mL/min/1.73m2   CBC with platelets  and differential    Collection Time: 08/04/22 10:09 AM   Result Value Ref Range    WBC Count 5.7 4.0 - 11.0 10e3/uL    RBC Count 4.51 4.40 - 5.90 10e6/uL    Hemoglobin 13.2 (L) 13.3 - 17.7 g/dL    Hematocrit 40.2 40.0 - 53.0 %    MCV 89 78 - 100 fL    MCH 29.3 26.5 - 33.0 pg    MCHC 32.8 31.5 - 36.5 g/dL    RDW 12.7 10.0 - 15.0 %    Platelet Count 269 150 - 450 10e3/uL    % Neutrophils 57 %    % Lymphocytes 31 %    % Monocytes 9 %    % Eosinophils 2 %    % Basophils 1 %    % Immature Granulocytes 0 %    NRBCs per 100 WBC 0 <1 /100    Absolute Neutrophils 3.3 1.6 - 8.3 10e3/uL    Absolute Lymphocytes 1.8 0.8 - 5.3 10e3/uL    Absolute Monocytes 0.5 0.0 - 1.3 10e3/uL    Absolute Eosinophils 0.1 0.0 - 0.7 10e3/uL    Absolute Basophils 0.0 0.0 - 0.2 10e3/uL    Absolute Immature Granulocytes 0.0 <=0.4 10e3/uL    Absolute NRBCs 0.0 10e3/uL           Advanced Care Planning    Time > 35 minutes with greater than 50% of time spent in counseling and coordination of care.     Campos Desouza MD  Date: 8/4/2022  Time: 2:25 PM  [facility]

## 2022-08-04 NOTE — ED NOTES
Patient getting restless and anxious about getting his Methadone dosing.  Stating his clinic closes at 1130am and talking about leaving so he can get there in time. MD notified.

## 2022-08-05 NOTE — SIGNIFICANT EVENT
Patient informed writer after entering the room that he was leaving the hospital and that he does not have to be her and he has appointment with his doctor. Patient did not say which doctor. Patient had peripheral line( was running) disconnected and removed from his right foreram and machine was beeping. Pressure dressing was applied on iv site.  AMA paperwork given to patient and he signed. He reports that his wife his already driving from [place] to come pick him up.Patient had a bag with all his belonging and mask was given for him to wear. Patient walked with steady gait and left the unit.

## 2022-08-05 NOTE — SIGNIFICANT EVENT
"Significant Event Note    Time of event: 7:37 PM August 4, 2022    Description of event:  Pt doesn't want to remained hospitalized.  Chart reviewed-surgery signed off. MRCP showed biliary abnormalities, stable from prior test.  Ot with ongoing abd pain. States he didn't eat for 3 days, and didn't start diet post mrcp.  I recommended pt remains hospitalized, start po, and likely be discharge in am if tolerating po.  He disagreed and insisting on leaving the hospital: \"I have family emergency; me wife needs me asap: I need a note about positive covid test to quarantine for 2 weeks\".    Plan:  AMA paperwork for pt to sign.  Nope reg his COVID positive status has been posted-RN to print it out for the patient.    Discussed with: patient's family/emergency contact and bedside nurse    Oralia Stafford MD    "

## 2022-10-16 ENCOUNTER — HOSPITAL ENCOUNTER (EMERGENCY)
Facility: HOSPITAL | Age: 37
Discharge: LEFT BEFORE DISCHARGE | End: 2022-10-16
Attending: EMERGENCY MEDICINE | Admitting: EMERGENCY MEDICINE
Payer: COMMERCIAL

## 2022-10-16 ENCOUNTER — APPOINTMENT (OUTPATIENT)
Dept: ULTRASOUND IMAGING | Facility: HOSPITAL | Age: 37
End: 2022-10-16
Payer: COMMERCIAL

## 2022-10-16 VITALS
OXYGEN SATURATION: 97 % | WEIGHT: 170 LBS | RESPIRATION RATE: 16 BRPM | SYSTOLIC BLOOD PRESSURE: 125 MMHG | HEART RATE: 88 BPM | BODY MASS INDEX: 23.03 KG/M2 | TEMPERATURE: 99.5 F | DIASTOLIC BLOOD PRESSURE: 82 MMHG | HEIGHT: 72 IN

## 2022-10-16 DIAGNOSIS — R11.2 NAUSEA AND VOMITING: ICD-10-CM

## 2022-10-16 DIAGNOSIS — R10.13 ABDOMINAL PAIN, EPIGASTRIC: ICD-10-CM

## 2022-10-16 LAB
ALBUMIN SERPL BCG-MCNC: 4.8 G/DL (ref 3.5–5.2)
ALP SERPL-CCNC: 101 U/L (ref 40–129)
ALT SERPL W P-5'-P-CCNC: 14 U/L (ref 10–50)
ANION GAP SERPL CALCULATED.3IONS-SCNC: 11 MMOL/L (ref 7–15)
AST SERPL W P-5'-P-CCNC: 18 U/L (ref 10–50)
BASOPHILS # BLD AUTO: 0 10E3/UL (ref 0–0.2)
BASOPHILS NFR BLD AUTO: 1 %
BILIRUB DIRECT SERPL-MCNC: <0.2 MG/DL (ref 0–0.3)
BILIRUB SERPL-MCNC: 0.2 MG/DL
BUN SERPL-MCNC: 10.2 MG/DL (ref 6–20)
CALCIUM SERPL-MCNC: 8.9 MG/DL (ref 8.6–10)
CHLORIDE SERPL-SCNC: 95 MMOL/L (ref 98–107)
CREAT SERPL-MCNC: 0.78 MG/DL (ref 0.67–1.17)
DEPRECATED HCO3 PLAS-SCNC: 28 MMOL/L (ref 22–29)
EOSINOPHIL # BLD AUTO: 0 10E3/UL (ref 0–0.7)
EOSINOPHIL NFR BLD AUTO: 0 %
ERYTHROCYTE [DISTWIDTH] IN BLOOD BY AUTOMATED COUNT: 13.1 % (ref 10–15)
GFR SERPL CREATININE-BSD FRML MDRD: >90 ML/MIN/1.73M2
GLUCOSE SERPL-MCNC: 101 MG/DL (ref 70–99)
HCT VFR BLD AUTO: 48.2 % (ref 40–53)
HGB BLD-MCNC: 15.7 G/DL (ref 13.3–17.7)
IMM GRANULOCYTES # BLD: 0 10E3/UL
IMM GRANULOCYTES NFR BLD: 0 %
LIPASE SERPL-CCNC: 16 U/L (ref 13–60)
LYMPHOCYTES # BLD AUTO: 1.1 10E3/UL (ref 0.8–5.3)
LYMPHOCYTES NFR BLD AUTO: 14 %
MCH RBC QN AUTO: 28.8 PG (ref 26.5–33)
MCHC RBC AUTO-ENTMCNC: 32.6 G/DL (ref 31.5–36.5)
MCV RBC AUTO: 88 FL (ref 78–100)
MONOCYTES # BLD AUTO: 0.4 10E3/UL (ref 0–1.3)
MONOCYTES NFR BLD AUTO: 5 %
NEUTROPHILS # BLD AUTO: 6.3 10E3/UL (ref 1.6–8.3)
NEUTROPHILS NFR BLD AUTO: 80 %
NRBC # BLD AUTO: 0 10E3/UL
NRBC BLD AUTO-RTO: 0 /100
PLATELET # BLD AUTO: 333 10E3/UL (ref 150–450)
POTASSIUM SERPL-SCNC: 3.8 MMOL/L (ref 3.4–5.3)
PROT SERPL-MCNC: 8 G/DL (ref 6.4–8.3)
RBC # BLD AUTO: 5.46 10E6/UL (ref 4.4–5.9)
SODIUM SERPL-SCNC: 134 MMOL/L (ref 136–145)
WBC # BLD AUTO: 7.8 10E3/UL (ref 4–11)

## 2022-10-16 PROCEDURE — 85014 HEMATOCRIT: CPT | Performed by: STUDENT IN AN ORGANIZED HEALTH CARE EDUCATION/TRAINING PROGRAM

## 2022-10-16 PROCEDURE — 82248 BILIRUBIN DIRECT: CPT | Performed by: EMERGENCY MEDICINE

## 2022-10-16 PROCEDURE — 82310 ASSAY OF CALCIUM: CPT | Performed by: STUDENT IN AN ORGANIZED HEALTH CARE EDUCATION/TRAINING PROGRAM

## 2022-10-16 PROCEDURE — 250N000011 HC RX IP 250 OP 636

## 2022-10-16 PROCEDURE — 83690 ASSAY OF LIPASE: CPT | Performed by: EMERGENCY MEDICINE

## 2022-10-16 PROCEDURE — 36415 COLL VENOUS BLD VENIPUNCTURE: CPT | Performed by: STUDENT IN AN ORGANIZED HEALTH CARE EDUCATION/TRAINING PROGRAM

## 2022-10-16 PROCEDURE — 82248 BILIRUBIN DIRECT: CPT | Performed by: STUDENT IN AN ORGANIZED HEALTH CARE EDUCATION/TRAINING PROGRAM

## 2022-10-16 PROCEDURE — 96374 THER/PROPH/DIAG INJ IV PUSH: CPT

## 2022-10-16 PROCEDURE — 76705 ECHO EXAM OF ABDOMEN: CPT

## 2022-10-16 PROCEDURE — 83690 ASSAY OF LIPASE: CPT | Performed by: STUDENT IN AN ORGANIZED HEALTH CARE EDUCATION/TRAINING PROGRAM

## 2022-10-16 PROCEDURE — 96361 HYDRATE IV INFUSION ADD-ON: CPT

## 2022-10-16 PROCEDURE — 99285 EMERGENCY DEPT VISIT HI MDM: CPT | Mod: 25

## 2022-10-16 PROCEDURE — 250N000013 HC RX MED GY IP 250 OP 250 PS 637

## 2022-10-16 PROCEDURE — 96375 TX/PRO/DX INJ NEW DRUG ADDON: CPT

## 2022-10-16 PROCEDURE — 250N000011 HC RX IP 250 OP 636: Performed by: STUDENT IN AN ORGANIZED HEALTH CARE EDUCATION/TRAINING PROGRAM

## 2022-10-16 PROCEDURE — 258N000003 HC RX IP 258 OP 636

## 2022-10-16 PROCEDURE — 85025 COMPLETE CBC W/AUTO DIFF WBC: CPT | Performed by: EMERGENCY MEDICINE

## 2022-10-16 PROCEDURE — 80053 COMPREHEN METABOLIC PANEL: CPT | Performed by: EMERGENCY MEDICINE

## 2022-10-16 PROCEDURE — 250N000009 HC RX 250

## 2022-10-16 RX ORDER — ONDANSETRON 4 MG/1
4 TABLET, ORALLY DISINTEGRATING ORAL EVERY 8 HOURS PRN
Qty: 20 TABLET | Refills: 0 | Status: SHIPPED | OUTPATIENT
Start: 2022-10-16

## 2022-10-16 RX ORDER — ONDANSETRON 4 MG/1
4 TABLET, ORALLY DISINTEGRATING ORAL ONCE
Status: COMPLETED | OUTPATIENT
Start: 2022-10-16 | End: 2022-10-16

## 2022-10-16 RX ORDER — FAMOTIDINE 20 MG/1
20 TABLET, FILM COATED ORAL 2 TIMES DAILY
Qty: 30 TABLET | Refills: 0 | Status: SHIPPED | OUTPATIENT
Start: 2022-10-16

## 2022-10-16 RX ORDER — FAMOTIDINE 20 MG/1
20 TABLET, FILM COATED ORAL 2 TIMES DAILY
Qty: 30 TABLET | Refills: 0 | Status: SHIPPED | OUTPATIENT
Start: 2022-10-16 | End: 2022-10-16

## 2022-10-16 RX ORDER — METOCLOPRAMIDE HYDROCHLORIDE 5 MG/ML
10 INJECTION INTRAMUSCULAR; INTRAVENOUS ONCE
Status: COMPLETED | OUTPATIENT
Start: 2022-10-16 | End: 2022-10-16

## 2022-10-16 RX ORDER — ONDANSETRON 4 MG/1
4 TABLET, ORALLY DISINTEGRATING ORAL EVERY 8 HOURS PRN
Qty: 20 TABLET | Refills: 0 | Status: SHIPPED | OUTPATIENT
Start: 2022-10-16 | End: 2022-10-16

## 2022-10-16 RX ORDER — METHADONE HYDROCHLORIDE 10 MG/ML
85 CONCENTRATE ORAL ONCE
Status: COMPLETED | OUTPATIENT
Start: 2022-10-16 | End: 2022-10-16

## 2022-10-16 RX ADMIN — ONDANSETRON 4 MG: 4 TABLET, ORALLY DISINTEGRATING ORAL at 07:59

## 2022-10-16 RX ADMIN — LIDOCAINE HYDROCHLORIDE 30 ML: 20 SOLUTION ORAL; TOPICAL at 08:49

## 2022-10-16 RX ADMIN — PROCHLORPERAZINE EDISYLATE 10 MG: 5 INJECTION INTRAMUSCULAR; INTRAVENOUS at 10:39

## 2022-10-16 RX ADMIN — SODIUM CHLORIDE 1000 ML: 9 INJECTION, SOLUTION INTRAVENOUS at 08:48

## 2022-10-16 RX ADMIN — METHADONE HYDROCHLORIDE 85 MG: 10 CONCENTRATE ORAL at 12:21

## 2022-10-16 RX ADMIN — FAMOTIDINE 20 MG: 10 INJECTION, SOLUTION INTRAVENOUS at 08:45

## 2022-10-16 RX ADMIN — METOCLOPRAMIDE 10 MG: 5 INJECTION, SOLUTION INTRAMUSCULAR; INTRAVENOUS at 08:41

## 2022-10-16 ASSESSMENT — ENCOUNTER SYMPTOMS
CONSTIPATION: 0
BACK PAIN: 0
DIZZINESS: 0
NAUSEA: 1
DIAPHORESIS: 1
ABDOMINAL PAIN: 1
LIGHT-HEADEDNESS: 0
FLANK PAIN: 0
DYSURIA: 0
HEADACHES: 0
VOMITING: 1
APPETITE CHANGE: 1
SHORTNESS OF BREATH: 0
CHILLS: 1
DIARRHEA: 0
COUGH: 0
FREQUENCY: 0
FEVER: 0

## 2022-10-16 ASSESSMENT — ACTIVITIES OF DAILY LIVING (ADL)
ADLS_ACUITY_SCORE: 35

## 2022-10-16 NOTE — ED PROVIDER NOTES
Emergency Department Midlevel Supervisory Note     I personally saw the patient and performed a substantive portion of the visit including all aspects of the medical decision making.    Impression:  No diagnosis found.        MDM / ED Course:  36-year-old male with a history of duodenal stenosis and GERD who is on methadone presented to the ED for evaluation of upper abdominal pain as well as nausea and vomiting that been ongoing for the last few days.  Upon arrival to the ED the patient was noted to be hemodynamically stable.  My exam the patient was noted to have mild epigastric tenderness palpation without evidence of an acute abdomen.  After arriving to the ED the patient was given IV Zofran and famotidine as well as a GI cocktail.  He stated that the nausea initially improved but then worsened after receiving the Zofran.  He reports that the abdominal discomfort improved temporarily with the GI cocktail.    CBC, BMP, hepatic panel, and lipase were all reassuring.  The right upper quadrant ultrasound again shows gallbladder sludge and a dilated common bile duct.  However, it was unchanged when compared to the most recent ultrasound from 1 month earlier.      The patient reported additional improvement in his nausea with the dose of IV Compazine that he received.  The patient admitted that his abdominal pain may be related to the fact that he has not been able to take his methadone for the last few days secondary to the vomiting.  The patient was then given a half dose of his normal methadone to see if this would help with his abdominal pain.    After receiving his dose of oral methadone the patient reported resolution of his abdominal pain.  Patient then pulled out his IV and requested to leave.  The patient was asked to stay for paperwork and for the prescriptions to treat his nausea.  However, the patient left the ED prior to receiving the written prescriptions.  The prescriptions were then sent to his  pharmacy on file.      8:50 AM Angie Donald PA-C staffed patient with me. I agree with their assessment and plan of management, and I will see the patient.    10:53 AM I met with the patient to introduce myself, gather additional history, perform my initial exam, and discuss the plan.   12:47 PM Patient is ready for discharge    PPE: Provider wore gloves, N95 mask, eye protection.    Brief HPI:     Matt Ramirez is a 36 year old male with a pertinent history of methadone use,  who presents to this ED for evaluation of nausea and vomiting.      Patient reports, since hospitalization, he had been doing well at home on omeprazole.  Did not receive any Zofran from visit.  However, 3 days ago, started having nausea, vomiting and abdominal pain again.  reports symptoms feel exactly like previous hospitalization.  Currently, pain rated at 10 out of 10.  Describes pain as,crampy and sharp. Comes in waves of severity, however is always present. Been taking Tylenol without relief. Has been taking methadone as prescribed, but due to his vomiting, does not think he is actually getting the methadone in his system, which is making the pain and symptoms worse.  Has several episodes of vomiting daily and has not really had any full meals since symptoms started. Reports small amount of blood in vomit, but this is related to his vomiting.  Feels chilled and diaphoretic during vomiting episodes, but denies fevers or recent sick contacts. Pain is located in his right upper quadrant and mid abdomen.          I, Sergey Martini, am serving as a scribe to document services personally performed by Dr. Fabián Bonner, based on my observations and the provider's statements to me.   I, Dr. Fabián Bonner, attest that Sergey Martini was acting in a scribe capacity, has observed my performance of the services and has documented them in accordance with my direction.      Brief Physical Exam:  General presentation: Alert, Vital signs reviewed.  Uncomfortable-appearing  Abdominal: The abdomen is soft. Mild epigastric tenderness to palpation. No rigidity, guarding, or rebound. Bowel sounds normal.         Labs and Imaging:  Results for orders placed or performed during the hospital encounter of 10/16/22   Abdomen US, limited (RUQ only)    Impression    IMPRESSION:    1.  Gallbladder sludge, without evidence of acute cholecystitis.    2.  Chronic mild to moderate intrahepatic and extrahepatic biliary ductal dilation, similar to August 2022. No visualized choledocholiths.   Basic metabolic panel   Result Value Ref Range    Sodium 134 (L) 136 - 145 mmol/L    Potassium 3.8 3.4 - 5.3 mmol/L    Chloride 95 (L) 98 - 107 mmol/L    Carbon Dioxide (CO2) 28 22 - 29 mmol/L    Anion Gap 11 7 - 15 mmol/L    Urea Nitrogen 10.2 6.0 - 20.0 mg/dL    Creatinine 0.78 0.67 - 1.17 mg/dL    Calcium 8.9 8.6 - 10.0 mg/dL    Glucose 101 (H) 70 - 99 mg/dL    GFR Estimate >90 >60 mL/min/1.73m2   Hepatic function panel   Result Value Ref Range    Protein Total 8.0 6.4 - 8.3 g/dL    Albumin 4.8 3.5 - 5.2 g/dL    Bilirubin Total 0.2 <=1.2 mg/dL    Alkaline Phosphatase 101 40 - 129 U/L    AST 18 10 - 50 U/L    ALT 14 10 - 50 U/L    Bilirubin Direct <0.20 0.00 - 0.30 mg/dL   Result Value Ref Range    Lipase 16 13 - 60 U/L   CBC with platelets and differential   Result Value Ref Range    WBC Count 7.8 4.0 - 11.0 10e3/uL    RBC Count 5.46 4.40 - 5.90 10e6/uL    Hemoglobin 15.7 13.3 - 17.7 g/dL    Hematocrit 48.2 40.0 - 53.0 %    MCV 88 78 - 100 fL    MCH 28.8 26.5 - 33.0 pg    MCHC 32.6 31.5 - 36.5 g/dL    RDW 13.1 10.0 - 15.0 %    Platelet Count 333 150 - 450 10e3/uL    % Neutrophils 80 %    % Lymphocytes 14 %    % Monocytes 5 %    % Eosinophils 0 %    % Basophils 1 %    % Immature Granulocytes 0 %    NRBCs per 100 WBC 0 <1 /100    Absolute Neutrophils 6.3 1.6 - 8.3 10e3/uL    Absolute Lymphocytes 1.1 0.8 - 5.3 10e3/uL    Absolute Monocytes 0.4 0.0 - 1.3 10e3/uL    Absolute Eosinophils 0.0  0.0 - 0.7 10e3/uL    Absolute Basophils 0.0 0.0 - 0.2 10e3/uL    Absolute Immature Granulocytes 0.0 <=0.4 10e3/uL    Absolute NRBCs 0.0 10e3/uL     I have reviewed the relevant laboratory and radiology studies    Procedures:  I was present for the key portions of this procedure: none      Dr. Fabián Bonner  [facility] EMERGENCY DEPARTMENT  [address] [place] [id number] [phone]      Fabián Bonner DO  10/16/22 1506

## 2022-10-16 NOTE — ED TRIAGE NOTES
The patient presents with epigastric pain nausea and vomiting. The pt states he struggles with GERD; he has worsened over he past 3 days with nausea and vomiting multiple times.

## 2022-10-16 NOTE — ED NOTES
Pt states he has been nauseated for the past three days. Rates upper right abdominal pain 6/10. Pt actively vomiting. Wife at bedside.

## 2022-10-16 NOTE — ED NOTES
Patient returned from US with nausea. Pt states laying on his back and the US procedure caused the onset. Provider aware, new orders received, and meds given. Pt currently sitting in chair at bedside.

## 2022-10-16 NOTE — ED PROVIDER NOTES
EMERGENCY DEPARTMENT ENCOUNTER      NAME: Matt Ramirez  AGE: 36 year old male  YOB: 1985  MRN: [id number]  EVALUATION DATE & TIME: No admission date for patient encounter.    PCP: Aaron Hawley    ED PROVIDER: Angie Donald PA-C      Chief Complaint   Patient presents with     Abdominal Pain     Nausea, Vomiting, & Diarrhea       FINAL IMPRESSION:  1. Nausea and vomiting    2. Abdominal pain, epigastric        MEDICAL DECISION MAKING:    Pertinent Labs & Imaging studies reviewed. (See chart for details)  Matt Ramirez is a 36 year old male who presents for evaluation of nausea and vomiting x3 days.  History of duodenal stenosis and GERD follows with GI.  Recently admitted 8/3/2022 for similar symptoms.  Has endoscopy and colonoscopy scheduled with GI on 10/26/22.     On my initial evaluation, vital signs normal. On physical exam patient is awake, alert, uncomfortable appearing, but no acute distress.  Heart sounds normal, lungs are clear.  No chest wall tenderness on palpation.  He is tender over his right upper quadrant and epigastric area with even minimal palpation.  No distention, rebound, some voluntary guarding.  No masses.  Normal bowel sounds.  No CVA tenderness.    Differential diagnosis includes gastritis, esophageal stricture, cholecystitis, choledocholithiasis, cholestasis, pancreatitis, SBO, LBO, GERD, simran ewstbrook tear, diverticulitis, medication withdrawal . Emergency department workup included cbc, bmp, lipase, LFTs, US RUQ. Patient was given IVF, IV Pepcid, GI cocktail, IV Zofran, IV reglan with some improvement in symptoms.     US similar to previous with continued gallbladder sludge and 10 to 15 mm dilation of common bile duct.  No acute cholecystitis or choledocholithiasis. did speak with GI, who was physically in the ER and apparently the slight increase of common bile duct is not that impressive and likely not cause for patient's symptoms. Remainder of lab  workup looks good today.    Patient was initially feeling better after our initial interventions today, however on recheck of patient, reports symptoms had returned.  Patient thinks part of it is withdrawal from his methadone causing his symptoms to be worse.  We will give Compazine and try to give him his methadone to see if that helps his symptoms.    Update: Patient without continued vomiting after our interventions today, so did order Methadone as per wife's request. Due to concern for withdrawal, gave at half dose.  Patient received his methadone, felt instantly better and was trying to leave the ER.  Nurse was able to stop him initially, however she went to get his paperwork and prescriptions and came back to his room and he had eloped.  Patient's Zofran was sent to his pharmacy, patient did not receive his discharge paperwork.    ED COURSE:  8:13 AM  I reviewed the patient's chart. I met with the patient to gather history and to perform my initial exam.    I wore appropriate PPE during this encounter including: facemask & eye protection   8:50 AM  I staffed this patient case with Dr. Bonner who agrees with plan at this time and will see the patient for their history, exam, and complete evaluation.  9:54 AM  I rechecked the patient, he is feeling better, will give Methadone at half dose as per recent visit concern for withdrawal with full dose  10:35 AM rechecked patient, not feeling better thinks he is in withdrawal from his methadone.  We will try giving Compazine and methadone to see if this helps.  11:22 AM Dr. Bonner rechecked patient and reported that he is feeling better after compazine. Still awaiting Methadone  11:28 AM spoke with pharmacist who wanted to verify half dose.   12:52 PM per RN, patient apparently felt instantly better after methadone and tried to leave, she was able to stop him first, but then she went to get his paperwork and he had eloped. Did not get paperwork.     MEDICATIONS GIVEN IN THE  EMERGENCY:  Medications   ondansetron (ZOFRAN ODT) ODT tab 4 mg (4 mg Oral Given 10/16/22 5619)   0.9% sodium chloride BOLUS (0 mLs Intravenous Stopped 10/16/22 0950)   metoclopramide (REGLAN) injection 10 mg (10 mg Intravenous Given 10/16/22 0841)   famotidine (PEPCID) injection 20 mg (20 mg Intravenous Given 10/16/22 0845)   lidocaine (viscous) (XYLOCAINE) 2 % 15 mL, alum & mag hydroxide-simethicone (MAALOX) 15 mL GI Cocktail (30 mLs Oral Given 10/16/22 0849)   methadone (DOLOPHINE-INTENSOL) 10 MG/ML (HIGH CONC) solution 85 mg (85 mg Oral Given 10/16/22 1221)   prochlorperazine (COMPAZINE) injection 10 mg (10 mg Intravenous Given 10/16/22 1039)       NEW PRESCRIPTIONS STARTED AT TODAY'S ER VISIT  Discharge Medication List as of 10/16/2022  1:13 PM               =================================================================    HPI:    Patient information was obtained from: patient and wife    Use of Interpretor: N/A        Matt Ramirez is a 36 year old male with a pertinent history of methadone use,  who presents to this ED for evaluation of nausea and vomiting.     Per Chart Review, admitted 8/3-8/4/22 for nausea and vomiting and abdominal pain. Concern for methadone withdrawal. US done which showed gallbladder sludge and 10 mm dilation of CBD. Spoke with general surgery and GI and patient underwent MRCP, which was unremarkable. Discharged with close follow up.     Patient reports, since hospitalization, he had been doing well at home on omeprazole.  Did not receive any Zofran from visit.  However, 3 days ago, started having nausea, vomiting and abdominal pain again.  reports symptoms feel exactly like previous hospitalization.  Does have endoscopy and colonoscopy scheduled with GI on 10/26.     Currently, pain rated at 10 out of 10.  Describes pain as, crampy and sharp.  Comes in waves of severity, however is always present.  Been taking Tylenol without relief.  Has been taking methadone as prescribed, but  due to his vomiting, does not think he is actually getting the methadone in his system, which is making the pain and symptoms worse.  Has several episodes of vomiting daily and has not really had any full meals since symptoms started.  Reports small amount of blood in vomit, but this is related to his vomiting.  Feels chilled and diaphoretic during vomiting episodes, but denies fevers or recent sick contacts.  Denies known triggers.  Pain is located in his right upper quadrant and mid abdomen.  Denies urinary symptoms, diarrhea, constipation, dizziness, fatigue, headaches, chest pain, shortness of breath, back pain.    REVIEW OF SYSTEMS:  Review of Systems   Constitutional: Positive for appetite change (decreased), chills and diaphoresis. Negative for fever.   Respiratory: Negative for cough and shortness of breath.    Cardiovascular: Negative for chest pain.   Gastrointestinal: Positive for abdominal pain (Right upper quadrant, epigastric), nausea and vomiting. Negative for constipation and diarrhea.   Genitourinary: Negative for dysuria, flank pain and frequency.   Musculoskeletal: Negative for back pain.   Neurological: Negative for dizziness, light-headedness and headaches.   All other systems reviewed and are negative.       PAST MEDICAL HISTORY:  Past Medical History:   Diagnosis Date     Duodenal stenosis      Gastroesophageal reflux disease      Generalized anxiety disorder with panic attacks      Methadone use      Microcytic anemia      Peptic ulcer disease        PAST SURGICAL HISTORY:  Past Surgical History:   Procedure Laterality Date     PARTIAL GASTRECTOMY       TENDON REPAIR      left pinky finger       CURRENT MEDICATIONS:    No current facility-administered medications for this encounter.    Current Outpatient Medications:      famotidine (PEPCID) 20 MG tablet, Take 1 tablet (20 mg) by mouth 2 times daily, Disp: 30 tablet, Rfl: 0     methadone (DOLOPHINE-INTENSOL) 10 MG/ML (HIGH CONC) solution,  Take 170 mg by mouth daily , Disp: , Rfl:      ondansetron (ZOFRAN ODT) 4 MG ODT tab, Take 1 tablet (4 mg) by mouth every 8 hours as needed for nausea, Disp: 20 tablet, Rfl: 0     gabapentin (NEURONTIN) 300 MG capsule, Take 600 mg by mouth 2 times daily, Disp: , Rfl:      levETIRAcetam (KEPPRA) 500 MG tablet, Take 500 mg by mouth daily, Disp: , Rfl:      meclizine (ANTIVERT) 25 MG tablet, Take 1 tablet (25 mg) by mouth 3 times daily as needed for dizziness, Disp: 10 tablet, Rfl: 0     omeprazole (PRILOSEC) 40 MG DR capsule, Take 40 mg by mouth 2 times daily, Disp: , Rfl:      ondansetron (ZOFRAN-ODT) 4 MG ODT tab, Take 1 tablet (4 mg) by mouth every 6 hours as needed for nausea, Disp: 10 tablet, Rfl: 0     testosterone cypionate (DEPOTESTOSTERONE) 200 MG/ML injection, Inject 200 mg into the muscle every 14 days, Disp: , Rfl:     ALLERGIES:  Allergies   Allergen Reactions     Aspirin        FAMILY HISTORY:  Family History   Problem Relation Age of Onset     Heart Disease Mother      Hypertension Mother      Hyperlipidemia Mother      Anesthesia Reaction Mother         behavioral changes, no personal reaction     Diabetes Father      Hypertension Father      Hyperlipidemia Father      LUNG DISEASE Father      No Known Problems Brother      Diabetes Maternal Grandmother      Cerebrovascular Disease Maternal Grandmother      Deep Vein Thrombosis Maternal Grandmother      No Known Problems Brother      No Known Problems Brother      Colon Cancer No family hx of      Prostate Cancer No family hx of        SOCIAL HISTORY:   Social History     Socioeconomic History     Marital status:    Tobacco Use     Smoking status: Former     Packs/day: 1.00     Types: Cigarettes     Quit date: 2019     Years since quitting: 3.7     Smokeless tobacco: Never   Substance and Sexual Activity     Alcohol use: Not Currently     Drug use: Yes     Comment: Methadone       VITALS:  Patient Vitals for the past 24 hrs:   BP Temp Temp src  Pulse Resp SpO2 Height Weight   10/16/22 1230 125/82 -- -- 88 -- 97 % -- --   10/16/22 1130 126/74 -- -- 94 -- 97 % -- --   10/16/22 1100 120/82 -- -- 92 -- 96 % -- --   10/16/22 1030 (!) 145/75 -- -- -- -- -- -- --   10/16/22 0930 (!) 128/93 -- -- 90 -- 99 % -- --   10/16/22 0900 130/78 -- -- 90 -- 98 % -- --   10/16/22 0830 (!) 135/91 -- -- 90 -- 97 % -- --   10/16/22 0747 130/56 99.5  F (37.5  C) Temporal 99 16 97 % 1.829 m (6') 77.1 kg (170 lb)       PHYSICAL EXAM    Constitutional: Well developed, Well nourished, NAD, uncomfortable appearing  HENT: Normocephalic, Atraumatic, Bilateral external ears normal, Oropharynx normal, mucous membranes moist, Nose normal.   Neck: Normal range of motion, No tenderness, Supple, No stridor.  Eyes: PERRL, EOMI, Conjunctiva normal, No discharge.   Respiratory: Normal breath sounds, No respiratory distress, No wheezing, Speaks full sentences easily. No cough.  Cardiovascular: Normal heart rate, Regular rhythm, No murmurs, No rubs, No gallops. Chest wall nontender.  GI: Soft, tender over his right upper quadrant and epigastric area with even minimal palpation.  No distention, rebound, some voluntary guarding.  No masses.  Normal bowel sounds.  No CVA tenderness.   Musculoskeletal: 2+ DP pulses. No edema. No cyanosis, No clubbing. Good range of motion in all major joints. No tenderness to palpation or major deformities noted. No tenderness of the CTLS spine.   Integument: Warm, Dry, No erythema, No rash. No petechiae.  Neurologic: Alert & oriented x 3, Normal motor function, Normal sensory function, No focal deficits noted. Normal gait.  Psychiatric: Affect normal, Judgment normal, Mood normal. Cooperative.    LAB:  All pertinent labs reviewed and interpreted.  Labs Ordered and Resulted from Time of ED Arrival to Time of ED Departure   BASIC METABOLIC PANEL - Abnormal       Result Value    Sodium 134 (*)     Potassium 3.8      Chloride 95 (*)     Carbon Dioxide (CO2) 28       Anion Gap 11      Urea Nitrogen 10.2      Creatinine 0.78      Calcium 8.9      Glucose 101 (*)     GFR Estimate >90     HEPATIC FUNCTION PANEL - Normal    Protein Total 8.0      Albumin 4.8      Bilirubin Total 0.2      Alkaline Phosphatase 101      AST 18      ALT 14      Bilirubin Direct <0.20     LIPASE - Normal    Lipase 16     CBC WITH PLATELETS AND DIFFERENTIAL    WBC Count 7.8      RBC Count 5.46      Hemoglobin 15.7      Hematocrit 48.2      MCV 88      MCH 28.8      MCHC 32.6      RDW 13.1      Platelet Count 333      % Neutrophils 80      % Lymphocytes 14      % Monocytes 5      % Eosinophils 0      % Basophils 1      % Immature Granulocytes 0      NRBCs per 100 WBC 0      Absolute Neutrophils 6.3      Absolute Lymphocytes 1.1      Absolute Monocytes 0.4      Absolute Eosinophils 0.0      Absolute Basophils 0.0      Absolute Immature Granulocytes 0.0      Absolute NRBCs 0.0         RADIOLOGY:  Reviewed all pertinent imaging. Please see official radiology report.  Abdomen US, limited (RUQ only)   Final Result   IMPRESSION:      1.  Gallbladder sludge, without evidence of acute cholecystitis.      2.  Chronic mild to moderate intrahepatic and extrahepatic biliary ductal dilation, similar to August 2022. No visualized choledocholiths.          Diagnosis:  1. Nausea and vomiting    2. Abdominal pain, epigastric        Angie Donald PA-C  Emergency Medicine  [facility]  10/16/2022       Angie Donald PA-C  10/16/22 1721

## 2022-10-16 NOTE — DISCHARGE INSTRUCTIONS
Please bring this paperwork with you to your follow-up appointment.    You were seen in the urgent care/emergency department for nausea, vomiting and abdominal pain.       Your blood work looked good today.  You had an ultrasound done of your abdomen, which was not changed from your previous one.    Please follow-up with your GI specialist as previously scheduled for your endoscopy.    For your symptoms:  Zofran as needed for nausea.     Please take pepcid twice a day    Tylenol/ibuprofen as needed  You may take up to 650 mg of Tylenol (acetaminophen) up to 4 times daily and up to 600 mg of ibuprofen up to 4 times daily as needed for fever, pain.  Please do not take more than the daily maximum recommended dose (tylenol = 4 grams, ibuprofen = 2.4 grams) as it can cause harm to your liver, kidneys, stomach.  It is best to take ibuprofen with food. Please read labels of any over-the-counter medicine you may be taking as it may contain Tylenol (acetaminophen) or Advil (ibuprofen).     Follow up with your primary care provider for recheck in 3 days and with GI as previously scheduled for endoscopy.     Return to the emergency department if you develop worsening pain, nausea, vomiting, or any other new worsening or concerning symptoms. We'd be happy to see you again.    Thank you for allowing us to be part of your care today.    Take care!  -Angie Donald PA-C

## 2022-10-16 NOTE — ED NOTES
Patient was in the hallway dressed and heading for the exit. When stopped pt states he thought he was discharged. Pt went back to his room. Pt had also removed his own IV, no bleeding at site. When this nurse went to update provider the pt left the building unknown to me.

## 2022-10-18 ENCOUNTER — APPOINTMENT (OUTPATIENT)
Dept: CT IMAGING | Facility: HOSPITAL | Age: 37
End: 2022-10-18
Attending: EMERGENCY MEDICINE
Payer: COMMERCIAL

## 2022-10-18 ENCOUNTER — HOSPITAL ENCOUNTER (EMERGENCY)
Facility: HOSPITAL | Age: 37
Discharge: HOME | End: 2022-10-18
Attending: EMERGENCY MEDICINE | Admitting: EMERGENCY MEDICINE
Payer: COMMERCIAL

## 2022-10-18 ENCOUNTER — APPOINTMENT (OUTPATIENT)
Dept: RADIOLOGY | Facility: HOSPITAL | Age: 37
End: 2022-10-18
Attending: EMERGENCY MEDICINE
Payer: COMMERCIAL

## 2022-10-18 VITALS
DIASTOLIC BLOOD PRESSURE: 75 MMHG | TEMPERATURE: 99.6 F | WEIGHT: 170 LBS | OXYGEN SATURATION: 97 % | HEART RATE: 84 BPM | HEIGHT: 72 IN | BODY MASS INDEX: 23.03 KG/M2 | RESPIRATION RATE: 16 BRPM | SYSTOLIC BLOOD PRESSURE: 129 MMHG

## 2022-10-18 DIAGNOSIS — G47.00 INSOMNIA, UNSPECIFIED TYPE: ICD-10-CM

## 2022-10-18 DIAGNOSIS — R51.9 ACUTE NONINTRACTABLE HEADACHE, UNSPECIFIED HEADACHE TYPE: ICD-10-CM

## 2022-10-18 LAB
ALBUMIN SERPL BCG-MCNC: 4.7 G/DL (ref 3.5–5.2)
ALP SERPL-CCNC: 98 U/L (ref 40–129)
ALT SERPL W P-5'-P-CCNC: 14 U/L (ref 10–50)
ANION GAP SERPL CALCULATED.3IONS-SCNC: 11 MMOL/L (ref 7–15)
AST SERPL W P-5'-P-CCNC: 18 U/L (ref 10–50)
BASE EXCESS BLDV CALC-SCNC: 5.9 MMOL/L
BILIRUB DIRECT SERPL-MCNC: <0.2 MG/DL (ref 0–0.3)
BILIRUB SERPL-MCNC: 0.3 MG/DL
BUN SERPL-MCNC: 7.7 MG/DL (ref 6–20)
CALCIUM SERPL-MCNC: 8.9 MG/DL (ref 8.6–10)
CHLORIDE SERPL-SCNC: 97 MMOL/L (ref 98–107)
CREAT SERPL-MCNC: 0.88 MG/DL (ref 0.67–1.17)
DEPRECATED HCO3 PLAS-SCNC: 29 MMOL/L (ref 22–29)
ERYTHROCYTE [DISTWIDTH] IN BLOOD BY AUTOMATED COUNT: 13.1 % (ref 10–15)
GFR SERPL CREATININE-BSD FRML MDRD: >90 ML/MIN/1.73M2
GLUCOSE SERPL-MCNC: 105 MG/DL (ref 70–99)
HCO3 BLDV-SCNC: 27 MMOL/L (ref 24–30)
HCT VFR BLD AUTO: 47.9 % (ref 40–53)
HGB BLD-MCNC: 15.8 G/DL (ref 13.3–17.7)
KETONES BLD-SCNC: 0.1 MMOL/L (ref 0–0.6)
LACTATE SERPL-SCNC: 1.6 MMOL/L (ref 0.7–2)
LIPASE SERPL-CCNC: 16 U/L (ref 13–60)
MAGNESIUM SERPL-MCNC: 2.4 MG/DL (ref 1.7–2.3)
MCH RBC QN AUTO: 29 PG (ref 26.5–33)
MCHC RBC AUTO-ENTMCNC: 33 G/DL (ref 31.5–36.5)
MCV RBC AUTO: 88 FL (ref 78–100)
OXYHGB MFR BLDV: 57.6 % (ref 70–75)
PCO2 BLDV: 52 MM HG (ref 35–50)
PH BLDV: 7.38 [PH] (ref 7.35–7.45)
PLATELET # BLD AUTO: 312 10E3/UL (ref 150–450)
PO2 BLDV: 31 MM HG (ref 25–47)
POTASSIUM SERPL-SCNC: 3.1 MMOL/L (ref 3.4–5.3)
PROT SERPL-MCNC: 7.3 G/DL (ref 6.4–8.3)
RBC # BLD AUTO: 5.44 10E6/UL (ref 4.4–5.9)
SAO2 % BLDV: 62.7 % (ref 70–75)
SODIUM SERPL-SCNC: 137 MMOL/L (ref 136–145)
TSH SERPL DL<=0.005 MIU/L-ACNC: 1.12 UIU/ML (ref 0.3–4.2)
WBC # BLD AUTO: 11.1 10E3/UL (ref 4–11)

## 2022-10-18 PROCEDURE — 250N000011 HC RX IP 250 OP 636: Performed by: EMERGENCY MEDICINE

## 2022-10-18 PROCEDURE — 96375 TX/PRO/DX INJ NEW DRUG ADDON: CPT

## 2022-10-18 PROCEDURE — 85014 HEMATOCRIT: CPT | Performed by: EMERGENCY MEDICINE

## 2022-10-18 PROCEDURE — 80053 COMPREHEN METABOLIC PANEL: CPT | Performed by: EMERGENCY MEDICINE

## 2022-10-18 PROCEDURE — 82805 BLOOD GASES W/O2 SATURATION: CPT | Performed by: EMERGENCY MEDICINE

## 2022-10-18 PROCEDURE — 83690 ASSAY OF LIPASE: CPT | Performed by: EMERGENCY MEDICINE

## 2022-10-18 PROCEDURE — 82248 BILIRUBIN DIRECT: CPT | Performed by: EMERGENCY MEDICINE

## 2022-10-18 PROCEDURE — 82010 KETONE BODYS QUAN: CPT | Performed by: EMERGENCY MEDICINE

## 2022-10-18 PROCEDURE — 83735 ASSAY OF MAGNESIUM: CPT | Performed by: EMERGENCY MEDICINE

## 2022-10-18 PROCEDURE — 99285 EMERGENCY DEPT VISIT HI MDM: CPT | Mod: 25

## 2022-10-18 PROCEDURE — 96374 THER/PROPH/DIAG INJ IV PUSH: CPT | Mod: 59

## 2022-10-18 PROCEDURE — 36415 COLL VENOUS BLD VENIPUNCTURE: CPT | Performed by: EMERGENCY MEDICINE

## 2022-10-18 PROCEDURE — 74177 CT ABD & PELVIS W/CONTRAST: CPT

## 2022-10-18 PROCEDURE — 71046 X-RAY EXAM CHEST 2 VIEWS: CPT

## 2022-10-18 PROCEDURE — 96361 HYDRATE IV INFUSION ADD-ON: CPT

## 2022-10-18 PROCEDURE — 258N000003 HC RX IP 258 OP 636: Performed by: EMERGENCY MEDICINE

## 2022-10-18 PROCEDURE — 84443 ASSAY THYROID STIM HORMONE: CPT | Performed by: EMERGENCY MEDICINE

## 2022-10-18 PROCEDURE — 83605 ASSAY OF LACTIC ACID: CPT | Performed by: EMERGENCY MEDICINE

## 2022-10-18 RX ORDER — IOPAMIDOL 755 MG/ML
100 INJECTION, SOLUTION INTRAVASCULAR ONCE
Status: COMPLETED | OUTPATIENT
Start: 2022-10-18 | End: 2022-10-18

## 2022-10-18 RX ORDER — HALOPERIDOL 5 MG/ML
5 INJECTION INTRAMUSCULAR ONCE
Status: COMPLETED | OUTPATIENT
Start: 2022-10-18 | End: 2022-10-18

## 2022-10-18 RX ORDER — DIPHENHYDRAMINE HYDROCHLORIDE 50 MG/ML
25 INJECTION INTRAMUSCULAR; INTRAVENOUS ONCE
Status: COMPLETED | OUTPATIENT
Start: 2022-10-18 | End: 2022-10-18

## 2022-10-18 RX ORDER — METOCLOPRAMIDE HYDROCHLORIDE 5 MG/ML
10 INJECTION INTRAMUSCULAR; INTRAVENOUS ONCE
Status: COMPLETED | OUTPATIENT
Start: 2022-10-18 | End: 2022-10-18

## 2022-10-18 RX ADMIN — HALOPERIDOL LACTATE 5 MG: 5 INJECTION, SOLUTION INTRAMUSCULAR at 10:13

## 2022-10-18 RX ADMIN — METOCLOPRAMIDE 10 MG: 5 INJECTION, SOLUTION INTRAMUSCULAR; INTRAVENOUS at 08:01

## 2022-10-18 RX ADMIN — IOPAMIDOL 100 ML: 755 INJECTION, SOLUTION INTRAVENOUS at 08:55

## 2022-10-18 RX ADMIN — DIPHENHYDRAMINE HYDROCHLORIDE 25 MG: 50 INJECTION, SOLUTION INTRAMUSCULAR; INTRAVENOUS at 08:00

## 2022-10-18 RX ADMIN — SODIUM CHLORIDE 1000 ML: 9 INJECTION, SOLUTION INTRAVENOUS at 07:58

## 2022-10-18 ASSESSMENT — ACTIVITIES OF DAILY LIVING (ADL)
ADLS_ACUITY_SCORE: 35
ADLS_ACUITY_SCORE: 35

## 2022-10-18 NOTE — ED TRIAGE NOTES
Patient states that he has not been able to eat or sleep for nine days. States that he feels like something is off, unsure what is going on.     Triage Assessment     Row Name 10/18/22 0704       Triage Assessment (Adult)    Airway WDL WDL       Respiratory WDL    Respiratory WDL WDL       Skin Circulation/Temperature WDL    Skin Circulation/Temperature WDL WDL       Cardiac WDL    Cardiac WDL WDL       Peripheral/Neurovascular WDL    Peripheral Neurovascular WDL WDL       Cognitive/Neuro/Behavioral WDL    Cognitive/Neuro/Behavioral WDL WDL

## 2022-10-18 NOTE — ED PROVIDER NOTES
[facility] EMERGENCY DEPARTMENT  PHYSICIAN NOTE    MRN: [id number]    FINAL IMPRESSION     1. Insomnia, unspecified type    2. Acute nonintractable headache, unspecified headache type          ED COURSE & MDM       7:17 AM Initial history and physical performed. Plan of care discussed. PPE utilized includes N95 mask, goggles, and gloves.    9:54 AM Patient reevaluated.    11:21 AM Patient reevaluated and updated.    Patient presented for anorexia and insomnia. Initial vital signs notable for mild tachycardia.  CT abdomen obtained due to reported constipation and obstipation and is difficulty with eating, no sign of bowel obstruction.  CT does show possible infectious versus inflammatory changes in the lungs, chest x-ray does not show any clear infectious process and he has no fever, cough, or shortness of breath.  I discussed this with the patient and he understands that this needs to be followed up to ensure that these findings are resolved in the future.  Labs are reassuring, no sign of electrolyte abnormality, JACKIE, DKA, or other emergent cause of his symptoms.  Patient feels reassured with the work-up and feels comfortable following up with his PCP.  He had a headache that had some improvement after meds in the ED, he would rather continue to rest at home rather than wait here for more meds. Patient discharged in stable condition. Return precautions provided. All questions answered.      Supplemental history obtained from: Family Member    External records reviewed: N/A    Independent interpretation of EKG, labs, and imaging: See Testing section below    Discussion with radiologist regarding test interpretation: See ED Course above    Discussion of management with another provider: See ED Course above    Testing considered but ultimately not pursued: None    Medications considered but not prescribed: N/A    Patient care impacted by: None    Consideration of admission/observation:  "N/A    Care significantly affected by Social Determinants of Health including: N/A    ===================================================================    HPI     Matt Ramirez is a 36 year old male with relevant PMH significant for peptic ulcer disease (s/p partial gastrectomy), generalized anxiety disorder with panic attacks, and methadone use presenting with decreased appetite and inability to sleep. Patient states that ~9 days ago he developed nausea and vomiting which has since resolved. Since then he has been unable to sleep or eat. He notes that he has been able to consume water and Gatorade but when he attempts to eat his food becomes \" dry mush in his mouth\". Patient is not passing gas and denies a recent bowel movement. Patient endorses chronic mid-abdominal pain secondary to his ulcers. He notes that \"something is seriously wrong with him\" as he also endorses tremors, palpitations, bilateral leg weakness, and a headache. Denies chest pain, difficulties breathing, fever, cough, and blurry or double vision. Patient denies drug or alcohol use. Patient denies additional medical concerns or complaints at this time.     Per chart review, patient was seen on 10/16 (~2 days ago) at [facility] for nausea and vomiting. Mild epigastric tenderness was noted. Patient administered IV Zofran and famotidine as well as a GI cocktail. CBC, BMP, hepatic panel, and lipase were insignificant. US revealed gallbladder sludge and a dilated common bile duct unchanged from previous US. Abdominal pain resolved by taking a smaller dose a methadone which patient had been unable to take prior. Patient discharged in stable condition.    ROS  All other ROS negative.    Problem list, medications, allergies, PMH, PSH, family history, and social history reviewed and updated as able in Epic.      PHYSICAL EXAM     Vitals:    10/18/22 0703 10/18/22 0819 10/18/22 1015   BP: (!) 168/101 134/82 129/75   Pulse: 110 91 84   Resp: 18 " 16 16   Temp: 99.6  F (37.6  C)     TempSrc: Temporal     SpO2: 99% 96% 97%   Weight: 77.1 kg (170 lb)     Height: 1.829 m (6')        Constitutional: Alert, no acute distress.  HENT: Normocephalic, atraumatic.  Eyes: Sclera anicteric, EOMI.  CV: Tachycardic, regular rhythm. Peripheral pulses intact.  Pulm: Non-labored respirations, CTAB.  Abdomen: Epigastric tenderness, no rebound or guarding.  MSK: No major deformities. Neck supple.  Neuro: Oriented to person, place, and time Normal speech. Strength 5/5 and symmetric in UE/LE, intact sensation, normal finger-to-nose, CN 2-12 intact.  Skin: Warm and dry, no rash.  Psych: Cooperative, able to follow commands. Intact attention.      TESTING   All testing reviewed and independently interpreted.    EKG  none    LABS  Labs Ordered and Resulted from Time of ED Arrival to Time of ED Departure   CBC WITH PLATELETS - Abnormal       Result Value    WBC Count 11.1 (*)     RBC Count 5.44      Hemoglobin 15.8      Hematocrit 47.9      MCV 88      MCH 29.0      MCHC 33.0      RDW 13.1      Platelet Count 312     BASIC METABOLIC PANEL - Abnormal    Sodium 137      Potassium 3.1 (*)     Chloride 97 (*)     Carbon Dioxide (CO2) 29      Anion Gap 11      Urea Nitrogen 7.7      Creatinine 0.88      Calcium 8.9      Glucose 105 (*)     GFR Estimate >90     MAGNESIUM - Abnormal    Magnesium 2.4 (*)    BLOOD GAS VENOUS - Abnormal    pH Venous 7.38      pCO2 Venous 52 (*)     pO2 Venous 31      Bicarbonate Venous 27      Base Excess/Deficit (+/-) 5.9      Oxyhemoglobin Venous 57.6 (*)     O2 Sat, Venous 62.7 (*)    HEPATIC FUNCTION PANEL - Normal    Protein Total 7.3      Albumin 4.7      Bilirubin Total 0.3      Alkaline Phosphatase 98      AST 18      ALT 14      Bilirubin Direct <0.20     LIPASE - Normal    Lipase 16     KETONE BETA-HYDROXYBUTYRATE QUANTITATIVE, RAPID - Normal    Ketone (Beta-Hydroxybutyrate) Quantitative 0.1     LACTIC ACID WHOLE BLOOD - Normal    Lactic Acid 1.6      TSH WITH FREE T4 REFLEX - Normal    TSH 1.12         IMAGING  XR Chest 2 Views   Final Result   IMPRESSION: Heart size and vascularity are normal. Lungs are hyperinflated. Mild bronchial wall thickening suggests airway inflammation. Linear subsegmental atelectasis in the medial segment right middle lobe. No focal consolidation nor pleural effusion.      CT Abdomen Pelvis w Contrast   Final Result   IMPRESSION:    1.  Moderate amount of stool throughout the colon. No evidence of obstruction or acute inflammation.      2.  Patchy airspace opacities in the middle lobe, likely infectious or inflammatory.      3.  Unchanged chronic biliary dilation.            I attest that Margaret Espinoza is acting in a scribe capacity, has observed my performance of services, and has documented them in accordance with my direction.       Raman, MD Cyrus  10/18/22 5704

## 2023-04-16 ENCOUNTER — APPOINTMENT (OUTPATIENT)
Dept: ULTRASOUND IMAGING | Facility: HOSPITAL | Age: 38
End: 2023-04-16
Attending: EMERGENCY MEDICINE
Payer: COMMERCIAL

## 2023-04-16 ENCOUNTER — HOSPITAL ENCOUNTER (EMERGENCY)
Facility: HOSPITAL | Age: 38
Discharge: HOME | End: 2023-04-16
Attending: EMERGENCY MEDICINE | Admitting: EMERGENCY MEDICINE
Payer: COMMERCIAL

## 2023-04-16 ENCOUNTER — APPOINTMENT (OUTPATIENT)
Dept: CT IMAGING | Facility: HOSPITAL | Age: 38
End: 2023-04-16
Attending: EMERGENCY MEDICINE
Payer: COMMERCIAL

## 2023-04-16 VITALS
WEIGHT: 169.5 LBS | SYSTOLIC BLOOD PRESSURE: 125 MMHG | DIASTOLIC BLOOD PRESSURE: 66 MMHG | OXYGEN SATURATION: 100 % | TEMPERATURE: 98.2 F | RESPIRATION RATE: 16 BRPM | HEIGHT: 72 IN | BODY MASS INDEX: 22.96 KG/M2 | HEART RATE: 90 BPM

## 2023-04-16 DIAGNOSIS — R10.13 ABDOMINAL PAIN, EPIGASTRIC: ICD-10-CM

## 2023-04-16 LAB
ALBUMIN SERPL BCG-MCNC: 4.3 G/DL (ref 3.5–5.2)
ALP SERPL-CCNC: 90 U/L (ref 40–129)
ALT SERPL W P-5'-P-CCNC: 12 U/L (ref 10–50)
ANION GAP SERPL CALCULATED.3IONS-SCNC: 10 MMOL/L (ref 7–15)
AST SERPL W P-5'-P-CCNC: 17 U/L (ref 10–50)
BASOPHILS # BLD AUTO: 0 10E3/UL (ref 0–0.2)
BASOPHILS NFR BLD AUTO: 1 %
BILIRUB SERPL-MCNC: 0.2 MG/DL
BUN SERPL-MCNC: 9.8 MG/DL (ref 6–20)
CALCIUM SERPL-MCNC: 9.3 MG/DL (ref 8.6–10)
CHLORIDE SERPL-SCNC: 103 MMOL/L (ref 98–107)
CREAT SERPL-MCNC: 0.95 MG/DL (ref 0.67–1.17)
DEPRECATED HCO3 PLAS-SCNC: 29 MMOL/L (ref 22–29)
EOSINOPHIL # BLD AUTO: 0.3 10E3/UL (ref 0–0.7)
EOSINOPHIL NFR BLD AUTO: 7 %
ERYTHROCYTE [DISTWIDTH] IN BLOOD BY AUTOMATED COUNT: 17.1 % (ref 10–15)
GFR SERPL CREATININE-BSD FRML MDRD: >90 ML/MIN/1.73M2
GLUCOSE SERPL-MCNC: 121 MG/DL (ref 70–99)
HCT VFR BLD AUTO: 38.6 % (ref 40–53)
HGB BLD-MCNC: 11.9 G/DL (ref 13.3–17.7)
IMM GRANULOCYTES # BLD: 0 10E3/UL
IMM GRANULOCYTES NFR BLD: 0 %
LIPASE SERPL-CCNC: 19 U/L (ref 13–60)
LYMPHOCYTES # BLD AUTO: 1.3 10E3/UL (ref 0.8–5.3)
LYMPHOCYTES NFR BLD AUTO: 29 %
MCH RBC QN AUTO: 24.4 PG (ref 26.5–33)
MCHC RBC AUTO-ENTMCNC: 30.8 G/DL (ref 31.5–36.5)
MCV RBC AUTO: 79 FL (ref 78–100)
MONOCYTES # BLD AUTO: 0.4 10E3/UL (ref 0–1.3)
MONOCYTES NFR BLD AUTO: 9 %
NEUTROPHILS # BLD AUTO: 2.3 10E3/UL (ref 1.6–8.3)
NEUTROPHILS NFR BLD AUTO: 54 %
NRBC # BLD AUTO: 0 10E3/UL
NRBC BLD AUTO-RTO: 0 /100
PLATELET # BLD AUTO: 180 10E3/UL (ref 150–450)
POTASSIUM SERPL-SCNC: 3.4 MMOL/L (ref 3.4–5.3)
PROT SERPL-MCNC: 7.2 G/DL (ref 6.4–8.3)
RBC # BLD AUTO: 4.87 10E6/UL (ref 4.4–5.9)
SODIUM SERPL-SCNC: 142 MMOL/L (ref 136–145)
WBC # BLD AUTO: 4.3 10E3/UL (ref 4–11)

## 2023-04-16 PROCEDURE — 83690 ASSAY OF LIPASE: CPT | Performed by: EMERGENCY MEDICINE

## 2023-04-16 PROCEDURE — C9113 INJ PANTOPRAZOLE SODIUM, VIA: HCPCS | Performed by: EMERGENCY MEDICINE

## 2023-04-16 PROCEDURE — 76705 ECHO EXAM OF ABDOMEN: CPT

## 2023-04-16 PROCEDURE — 99285 EMERGENCY DEPT VISIT HI MDM: CPT | Mod: 25

## 2023-04-16 PROCEDURE — 96375 TX/PRO/DX INJ NEW DRUG ADDON: CPT

## 2023-04-16 PROCEDURE — 250N000009 HC RX 250: Performed by: EMERGENCY MEDICINE

## 2023-04-16 PROCEDURE — 250N000011 HC RX IP 250 OP 636: Performed by: EMERGENCY MEDICINE

## 2023-04-16 PROCEDURE — 80053 COMPREHEN METABOLIC PANEL: CPT | Performed by: EMERGENCY MEDICINE

## 2023-04-16 PROCEDURE — 85025 COMPLETE CBC W/AUTO DIFF WBC: CPT | Performed by: EMERGENCY MEDICINE

## 2023-04-16 PROCEDURE — 250N000013 HC RX MED GY IP 250 OP 250 PS 637: Performed by: EMERGENCY MEDICINE

## 2023-04-16 PROCEDURE — 96361 HYDRATE IV INFUSION ADD-ON: CPT

## 2023-04-16 PROCEDURE — 258N000003 HC RX IP 258 OP 636: Performed by: EMERGENCY MEDICINE

## 2023-04-16 PROCEDURE — 74177 CT ABD & PELVIS W/CONTRAST: CPT

## 2023-04-16 PROCEDURE — 36415 COLL VENOUS BLD VENIPUNCTURE: CPT | Performed by: EMERGENCY MEDICINE

## 2023-04-16 PROCEDURE — 96374 THER/PROPH/DIAG INJ IV PUSH: CPT | Mod: 59

## 2023-04-16 RX ORDER — KETOROLAC TROMETHAMINE 15 MG/ML
15 INJECTION, SOLUTION INTRAMUSCULAR; INTRAVENOUS ONCE
Status: COMPLETED | OUTPATIENT
Start: 2023-04-16 | End: 2023-04-16

## 2023-04-16 RX ORDER — IOPAMIDOL 755 MG/ML
90 INJECTION, SOLUTION INTRAVASCULAR ONCE
Status: COMPLETED | OUTPATIENT
Start: 2023-04-16 | End: 2023-04-16

## 2023-04-16 RX ORDER — ONDANSETRON 4 MG/1
4 TABLET, ORALLY DISINTEGRATING ORAL EVERY 8 HOURS PRN
Qty: 10 TABLET | Refills: 0 | Status: SHIPPED | OUTPATIENT
Start: 2023-04-16 | End: 2023-04-19

## 2023-04-16 RX ORDER — ONDANSETRON 2 MG/ML
4 INJECTION INTRAMUSCULAR; INTRAVENOUS ONCE
Status: COMPLETED | OUTPATIENT
Start: 2023-04-16 | End: 2023-04-16

## 2023-04-16 RX ORDER — METHADONE HYDROCHLORIDE 10 MG/ML
170 CONCENTRATE ORAL ONCE
Status: COMPLETED | OUTPATIENT
Start: 2023-04-16 | End: 2023-04-16

## 2023-04-16 RX ADMIN — IOPAMIDOL 90 ML: 755 INJECTION, SOLUTION INTRAVENOUS at 09:26

## 2023-04-16 RX ADMIN — PANTOPRAZOLE SODIUM 40 MG: 40 INJECTION, POWDER, FOR SOLUTION INTRAVENOUS at 08:10

## 2023-04-16 RX ADMIN — LIDOCAINE HYDROCHLORIDE 30 ML: 20 SOLUTION ORAL; TOPICAL at 08:11

## 2023-04-16 RX ADMIN — SODIUM CHLORIDE 1000 ML: 9 INJECTION, SOLUTION INTRAVENOUS at 08:07

## 2023-04-16 RX ADMIN — ONDANSETRON 4 MG: 2 INJECTION INTRAMUSCULAR; INTRAVENOUS at 08:10

## 2023-04-16 RX ADMIN — KETOROLAC TROMETHAMINE 15 MG: 15 INJECTION, SOLUTION INTRAMUSCULAR; INTRAVENOUS at 08:10

## 2023-04-16 RX ADMIN — METHADONE HYDROCHLORIDE 170 MG: 10 CONCENTRATE ORAL at 11:34

## 2023-04-16 ASSESSMENT — ACTIVITIES OF DAILY LIVING (ADL)
ADLS_ACUITY_SCORE: 35
ADLS_ACUITY_SCORE: 35

## 2023-04-16 NOTE — ED PROVIDER NOTES
"EMERGENCY DEPARTMENT ENCOUNTER      NAME: Matt Ramirez  AGE: 37 year old male  YOB: 1985  MRN: [id number]  EVALUATION DATE & TIME: 4/16/2023  7:44 AM    PCP: Aaron Hawley    ED PROVIDER: Clarice Thomas M.D.      Chief Complaint   Patient presents with     Abdominal Pain         FINAL IMPRESSION:  1. Abdominal pain, epigastric          ED COURSE & MEDICAL DECISION MAKING:    ED Course as of 04/16/23 1135   Sun Apr 16, 2023   0756 Pt with h/o PUD with prior episodes of nausea/vomiting, with epigastric pain and RUQ pain and NO history of gallstones (just \"sludge\" on prior workup), unable to keep down both his methadone for 3 days (pupils normal in the ED) and his omeprazole, amenable to GI cocktail, IVF, zofran, protonix IV, ketorolac IV, RUQ US and CT abdomen with h/o partial gastrectomy to ensure no internal hernia or obstruction without flatus today   0841 LFTs, lipase, electrolytes and chemistry WNL reassuringly. Patient to imaging shortly   1011 Chemistry, LFTs and lipase reasusringly wNL with normal CBC with hemoglobin 11.9 with known iron deficiency anemia   1011 RUQ US without change compared to prior without gallstones reassuringly   1012 CT abdomen with some slight bowel wall thickening posisbly protocolitis vs. Underdistention BUT no fever, normal WBC 4.3 and no diarrhea currently with most bothersome symtpom currently nausea and vomiting thus unlikely rectal pathology with no LLQ pain. Will reassess now with most likely etiology recurrent cyclic vomiting with some mild opioid withdrawal superimposed with normal VS now after IVF and zofran, protonix and with likely recurrent gastritis flare up.   1037 I went to reassess patient nad he notes he doesn't feel like he can be discharged without his methadone, he normally takes 170 mg orally methadone daily, vomited his up today and he feels like he is in mild withdrawal at this time and speculates he will have further nausea/vomiting if " he fails to get his oral methadone does here in the ED. He goes to [facility] methadone clinic and directs me to call them at [phone] to talk to emergency nurse to confirm that he is on such incredibly high dose of methadone, I called [facility] [phone] to confirm his dose. I spoke with Aminta who is RN at methadone clinic which he uses, per Aminta last seen 4/10 in their office and given 170 mg methadone dose and then take home doses through 4/16 and takes 170mg orally daily. This is consistent with patient's report and he found bottle also dispensed to him 4/10 that was to be taken 4/16, empty for 170 mg methadone on label, thus oral methadone ordered here with dose successfully confirmed.   1135 Pharmacist manually readjusting dose to meet home dose, as hard block here for administration of 170mg and required top max dose 120mg on my ordering       Pertinent Labs & Imaging studies reviewed. (See chart for details)    N95 worn  A face shield was worn also  COVID PPE    Medical Decision Making    History:    Supplemental history from: Documented in chart, if applicable    External Record(s) reviewed: Documented in chart, if applicable.    Work Up:    Chart documentation includes differential considered and any EKGs or imaging independently interpreted by provider, where specified.    In additional to work up documented, I considered the following work up: Documented in chart, if applicable.    External consultation:    Discussion of management with another provider: Documented in chart, if applicable    Complicating factors:    Care impacted by chronic illness: Other: Peptic ulcer disease    Care affected by social determinants of health: N/A    Disposition considerations: Discharge. I prescribed additional prescription strength medication(s) as charted. I considered admission, but discharged patient after significant clinical improvement.        At the conclusion of the encounter I discussed the  "results of all of the tests and the disposition. The questions were answered. The patient or family acknowledged understanding and was agreeable with the care plan.     MEDICATIONS GIVEN IN THE EMERGENCY:  Medications   ondansetron (ZOFRAN) injection 4 mg (4 mg Intravenous $Given 4/16/23 0810)   0.9% sodium chloride BOLUS (0 mLs Intravenous Stopped 4/16/23 0900)   pantoprazole (PROTONIX) IV push injection 40 mg (40 mg Intravenous $Given 4/16/23 0810)   lidocaine (viscous) (XYLOCAINE) 2 % 15 mL, alum & mag hydroxide-simethicone (MAALOX) 15 mL GI Cocktail (30 mLs Oral $Given 4/16/23 0811)   ketorolac (TORADOL) injection 15 mg (15 mg Intravenous $Given 4/16/23 0810)   iopamidol (ISOVUE-370) solution 90 mL (90 mLs Intravenous $Given 4/16/23 0926)   methadone (DOLOPHINE-INTENSOL) 10 MG/ML (HIGH CONC) solution 170 mg (170 mg Oral $Given 4/16/23 1134)       NEW PRESCRIPTIONS STARTED AT TODAY'S ER VISIT  New Prescriptions    ONDANSETRON (ZOFRAN ODT) 4 MG ODT TAB    Take 1 tablet (4 mg) by mouth every 8 hours as needed for nausea          =================================================================    HPI    Matt Ramirez is a 37 year old male with PMHx of Duodenal stenosis under partial gastrectomy with following duodenal stenosis and iron deficiency anemia who presents to the ED today via private car with  with abdominal pain.    For 2 weeks, patient reports not being able to hold down any food, and for the past 3 days he hasn't been able to keep his medications down. Now he feels as if he is in withdrawal. He also reports chills, diaphoresis, shakes, and nausea. He denies being able to pass gas. He mainly is complaining of upper epigastric pain that has a \"burning,\" sensation that moves from side to side. Patient rates pain at 6/10. Patient also notes a history of gallbladder sludge, not stones. Denies any fever, dysuria, hematuria, chest pain, or a cough.     REVIEW OF SYSTEMS   All other systems reviewed " and are negative except as noted above in HPI.    PAST MEDICAL HISTORY:  Past Medical History:   Diagnosis Date     Duodenal stenosis      Gastroesophageal reflux disease      Generalized anxiety disorder with panic attacks      Methadone use      Microcytic anemia      Peptic ulcer disease        PAST SURGICAL HISTORY:  Past Surgical History:   Procedure Laterality Date     PARTIAL GASTRECTOMY       TENDON REPAIR      left pinky finger       CURRENT MEDICATIONS:    ondansetron (ZOFRAN ODT) 4 MG ODT tab  famotidine (PEPCID) 20 MG tablet  gabapentin (NEURONTIN) 300 MG capsule  levETIRAcetam (KEPPRA) 500 MG tablet  meclizine (ANTIVERT) 25 MG tablet  methadone (DOLOPHINE-INTENSOL) 10 MG/ML (HIGH CONC) solution  omeprazole (PRILOSEC) 40 MG DR capsule  ondansetron (ZOFRAN ODT) 4 MG ODT tab  ondansetron (ZOFRAN-ODT) 4 MG ODT tab  testosterone cypionate (DEPOTESTOSTERONE) 200 MG/ML injection        ALLERGIES:  Allergies   Allergen Reactions     Aspirin        FAMILY HISTORY:  Family History   Problem Relation Age of Onset     Heart Disease Mother      Hypertension Mother      Hyperlipidemia Mother      Anesthesia Reaction Mother         behavioral changes, no personal reaction     Diabetes Father      Hypertension Father      Hyperlipidemia Father      LUNG DISEASE Father      No Known Problems Brother      Diabetes Maternal Grandmother      Cerebrovascular Disease Maternal Grandmother      Deep Vein Thrombosis Maternal Grandmother      No Known Problems Brother      No Known Problems Brother      Colon Cancer No family hx of      Prostate Cancer No family hx of        SOCIAL HISTORY:   Social History     Socioeconomic History     Marital status:      Spouse name: None     Number of children: None     Years of education: None     Highest education level: None   Tobacco Use     Smoking status: Former     Packs/day: 1.00     Types: Cigarettes     Quit date:      Years since quittin.2     Smokeless tobacco:  Never   Substance and Sexual Activity     Alcohol use: Not Currently     Drug use: Yes     Comment: Methadone       VITALS:  Patient Vitals for the past 24 hrs:   BP Temp Temp src Pulse Resp SpO2 Height Weight   04/16/23 1015 -- -- -- 90 -- 100 % -- --   04/16/23 1000 125/66 -- -- 77 -- 100 % -- --   04/16/23 0930 121/71 -- -- 70 -- 100 % -- --   04/16/23 0815 -- -- -- 84 -- 100 % -- --   04/16/23 0800 137/75 -- -- 89 -- 100 % -- --   04/16/23 0737 (!) 159/87 98.2  F (36.8  C) Oral 107 16 100 % 1.829 m (6') 76.9 kg (169 lb 8 oz)       PHYSICAL EXAM    GENERAL: Awake, alert.  In no acute distress.   HEENT: Normocephalic, atraumatic.  Pupils equal, round and reactive.  Conjunctiva normal.  EOMI.  NECK: No stridor or apparent deformity.  PULMONARY: Symmetrical breath sounds without distress.  Lungs clear to auscultation bilaterally without wheezes, rhonchi or rales.  CARDIO: Regular rate and rhythm.  No significant murmur, rub or gallop.  Radial pulses strong and symmetrical.  ABDOMINAL: Abdomen soft, non-distended.  No CVAT, no palpable hepatosplenomegaly. Upper epigastric tenderness.  EXTREMITIES: No lower extremity swelling or edema.    NEURO: Alert and oriented to person, place and time.  Cranial nerves grossly intact.  No focal motor deficit.  PSYCH: Normal mood and affect  SKIN: No rashes      LAB:  All pertinent labs reviewed and interpreted.  Results for orders placed or performed during the hospital encounter of 04/16/23   Abdomen US, limited (RUQ only)    Impression    IMPRESSION:  1.  Unchanged chronic mild-moderate intrahepatic and extrahepatic biliary dilation compared to 10/16/2022. No evidence of choledocholithiasis.    2.  Normal gallbladder.       CT Abdomen Pelvis w Contrast    Impression    IMPRESSION:   1.  Mild bowel wall thickening in the sigmoid colon and rectum could be related to lack of distention, although a mild proctocolitis could possibly have this appearance.  2.  Mild biliary dilatation  and distention of the gallbladder, not significantly changed.     Comprehensive metabolic panel   Result Value Ref Range    Sodium 142 136 - 145 mmol/L    Potassium 3.4 3.4 - 5.3 mmol/L    Chloride 103 98 - 107 mmol/L    Carbon Dioxide (CO2) 29 22 - 29 mmol/L    Anion Gap 10 7 - 15 mmol/L    Urea Nitrogen 9.8 6.0 - 20.0 mg/dL    Creatinine 0.95 0.67 - 1.17 mg/dL    Calcium 9.3 8.6 - 10.0 mg/dL    Glucose 121 (H) 70 - 99 mg/dL    Alkaline Phosphatase 90 40 - 129 U/L    AST 17 10 - 50 U/L    ALT 12 10 - 50 U/L    Protein Total 7.2 6.4 - 8.3 g/dL    Albumin 4.3 3.5 - 5.2 g/dL    Bilirubin Total 0.2 <=1.2 mg/dL    GFR Estimate >90 >60 mL/min/1.73m2   Result Value Ref Range    Lipase 19 13 - 60 U/L   CBC with platelets and differential   Result Value Ref Range    WBC Count 4.3 4.0 - 11.0 10e3/uL    RBC Count 4.87 4.40 - 5.90 10e6/uL    Hemoglobin 11.9 (L) 13.3 - 17.7 g/dL    Hematocrit 38.6 (L) 40.0 - 53.0 %    MCV 79 78 - 100 fL    MCH 24.4 (L) 26.5 - 33.0 pg    MCHC 30.8 (L) 31.5 - 36.5 g/dL    RDW 17.1 (H) 10.0 - 15.0 %    Platelet Count 180 150 - 450 10e3/uL    % Neutrophils 54 %    % Lymphocytes 29 %    % Monocytes 9 %    % Eosinophils 7 %    % Basophils 1 %    % Immature Granulocytes 0 %    NRBCs per 100 WBC 0 <1 /100    Absolute Neutrophils 2.3 1.6 - 8.3 10e3/uL    Absolute Lymphocytes 1.3 0.8 - 5.3 10e3/uL    Absolute Monocytes 0.4 0.0 - 1.3 10e3/uL    Absolute Eosinophils 0.3 0.0 - 0.7 10e3/uL    Absolute Basophils 0.0 0.0 - 0.2 10e3/uL    Absolute Immature Granulocytes 0.0 <=0.4 10e3/uL    Absolute NRBCs 0.0 10e3/uL       RADIOLOGY:  Reviewed all pertinent imaging. Please see official radiology report.  CT Abdomen Pelvis w Contrast   Final Result   IMPRESSION:    1.  Mild bowel wall thickening in the sigmoid colon and rectum could be related to lack of distention, although a mild proctocolitis could possibly have this appearance.   2.  Mild biliary dilatation and distention of the gallbladder, not significantly  changed.         Abdomen US, limited (RUQ only)   Final Result   IMPRESSION:   1.  Unchanged chronic mild-moderate intrahepatic and extrahepatic biliary dilation compared to 10/16/2022. No evidence of choledocholithiasis.      2.  Normal gallbladder.                        I, Shira Fontaine, am serving as a scribe to document services personally performed by Dr. Clarice Thomas based on my observation and the provider's statements to me. I, Clarice Thomas MD attest that Shira Fontaine is acting in a scribe capacity, has observed my performance of the services and has documented them in accordance with my direction.       Clarice Thomas MD  04/16/23 2313       Clarice Thomas MD  04/16/23 9250

## 2023-04-16 NOTE — ED NOTES
Pt resting on cot. Pt states his nausea has subsided. Rates abdominal pain 7/10. Wife remains at bedside.

## 2023-04-16 NOTE — ED TRIAGE NOTES
Patient presents here for evaluation of vomiting and upper abdominal pain that has occurred over the past two weeks. He also notes that he is unable to keep down fluids or his methadone. He notes a history of GERD, gastric ulcers and gall stones.

## 2024-06-16 ENCOUNTER — APPOINTMENT (OUTPATIENT)
Dept: CT IMAGING | Facility: HOSPITAL | Age: 39
End: 2024-06-16
Attending: EMERGENCY MEDICINE
Payer: COMMERCIAL

## 2024-06-16 ENCOUNTER — HOSPITAL ENCOUNTER (EMERGENCY)
Facility: HOSPITAL | Age: 39
Discharge: HOME | End: 2024-06-16
Attending: EMERGENCY MEDICINE | Admitting: EMERGENCY MEDICINE
Payer: COMMERCIAL

## 2024-06-16 VITALS
WEIGHT: 166.4 LBS | BODY MASS INDEX: 22.57 KG/M2 | DIASTOLIC BLOOD PRESSURE: 81 MMHG | OXYGEN SATURATION: 99 % | HEART RATE: 78 BPM | RESPIRATION RATE: 16 BRPM | SYSTOLIC BLOOD PRESSURE: 118 MMHG | TEMPERATURE: 98.7 F

## 2024-06-16 DIAGNOSIS — R11.2 NAUSEA AND VOMITING, UNSPECIFIED VOMITING TYPE: ICD-10-CM

## 2024-06-16 LAB
ALBUMIN SERPL BCG-MCNC: 4.7 G/DL (ref 3.5–5.2)
ALP SERPL-CCNC: 110 U/L (ref 40–150)
ALT SERPL W P-5'-P-CCNC: 13 U/L (ref 0–70)
ANION GAP SERPL CALCULATED.3IONS-SCNC: 12 MMOL/L (ref 7–15)
AST SERPL W P-5'-P-CCNC: 16 U/L (ref 0–45)
BASOPHILS # BLD AUTO: 0.1 10E3/UL (ref 0–0.2)
BASOPHILS NFR BLD AUTO: 1 %
BILIRUB DIRECT SERPL-MCNC: <0.2 MG/DL (ref 0–0.3)
BILIRUB SERPL-MCNC: 0.2 MG/DL
BUN SERPL-MCNC: 19.5 MG/DL (ref 6–20)
CALCIUM SERPL-MCNC: 9.6 MG/DL (ref 8.6–10)
CHLORIDE SERPL-SCNC: 99 MMOL/L (ref 98–107)
CREAT SERPL-MCNC: 0.95 MG/DL (ref 0.67–1.17)
DEPRECATED HCO3 PLAS-SCNC: 27 MMOL/L (ref 22–29)
EGFRCR SERPLBLD CKD-EPI 2021: >90 ML/MIN/1.73M2
EOSINOPHIL # BLD AUTO: 0.2 10E3/UL (ref 0–0.7)
EOSINOPHIL NFR BLD AUTO: 3 %
ERYTHROCYTE [DISTWIDTH] IN BLOOD BY AUTOMATED COUNT: 15.9 % (ref 10–15)
GLUCOSE SERPL-MCNC: 103 MG/DL (ref 70–99)
HCT VFR BLD AUTO: 45.8 % (ref 40–53)
HGB BLD-MCNC: 14.8 G/DL (ref 13.3–17.7)
HOLD SPECIMEN: NORMAL
IMM GRANULOCYTES # BLD: 0 10E3/UL
IMM GRANULOCYTES NFR BLD: 0 %
LIPASE SERPL-CCNC: 19 U/L (ref 13–60)
LYMPHOCYTES # BLD AUTO: 2.9 10E3/UL (ref 0.8–5.3)
LYMPHOCYTES NFR BLD AUTO: 43 %
MAGNESIUM SERPL-MCNC: 2.3 MG/DL (ref 1.7–2.3)
MCH RBC QN AUTO: 26.6 PG (ref 26.5–33)
MCHC RBC AUTO-ENTMCNC: 32.3 G/DL (ref 31.5–36.5)
MCV RBC AUTO: 82 FL (ref 78–100)
MONOCYTES # BLD AUTO: 0.5 10E3/UL (ref 0–1.3)
MONOCYTES NFR BLD AUTO: 8 %
NEUTROPHILS # BLD AUTO: 3.1 10E3/UL (ref 1.6–8.3)
NEUTROPHILS NFR BLD AUTO: 46 %
NRBC # BLD AUTO: 0 10E3/UL
NRBC BLD AUTO-RTO: 0 /100
PLATELET # BLD AUTO: 265 10E3/UL (ref 150–450)
POTASSIUM SERPL-SCNC: 4.2 MMOL/L (ref 3.4–5.3)
PROT SERPL-MCNC: 7.7 G/DL (ref 6.4–8.3)
RBC # BLD AUTO: 5.56 10E6/UL (ref 4.4–5.9)
SODIUM SERPL-SCNC: 138 MMOL/L (ref 135–145)
WBC # BLD AUTO: 6.8 10E3/UL (ref 4–11)

## 2024-06-16 PROCEDURE — 83735 ASSAY OF MAGNESIUM: CPT | Performed by: EMERGENCY MEDICINE

## 2024-06-16 PROCEDURE — 96374 THER/PROPH/DIAG INJ IV PUSH: CPT | Mod: 59

## 2024-06-16 PROCEDURE — 82248 BILIRUBIN DIRECT: CPT | Performed by: EMERGENCY MEDICINE

## 2024-06-16 PROCEDURE — 74177 CT ABD & PELVIS W/CONTRAST: CPT

## 2024-06-16 PROCEDURE — 258N000003 HC RX IP 258 OP 636: Mod: JZ | Performed by: EMERGENCY MEDICINE

## 2024-06-16 PROCEDURE — 96375 TX/PRO/DX INJ NEW DRUG ADDON: CPT

## 2024-06-16 PROCEDURE — 80053 COMPREHEN METABOLIC PANEL: CPT | Performed by: EMERGENCY MEDICINE

## 2024-06-16 PROCEDURE — 96361 HYDRATE IV INFUSION ADD-ON: CPT

## 2024-06-16 PROCEDURE — 83690 ASSAY OF LIPASE: CPT | Performed by: EMERGENCY MEDICINE

## 2024-06-16 PROCEDURE — 36415 COLL VENOUS BLD VENIPUNCTURE: CPT | Performed by: EMERGENCY MEDICINE

## 2024-06-16 PROCEDURE — 250N000011 HC RX IP 250 OP 636: Mod: JZ | Performed by: EMERGENCY MEDICINE

## 2024-06-16 PROCEDURE — 250N000013 HC RX MED GY IP 250 OP 250 PS 637: Performed by: EMERGENCY MEDICINE

## 2024-06-16 PROCEDURE — 99285 EMERGENCY DEPT VISIT HI MDM: CPT | Mod: 25

## 2024-06-16 PROCEDURE — 85025 COMPLETE CBC W/AUTO DIFF WBC: CPT | Performed by: EMERGENCY MEDICINE

## 2024-06-16 RX ORDER — IOPAMIDOL 755 MG/ML
81 INJECTION, SOLUTION INTRAVASCULAR ONCE
Status: COMPLETED | OUTPATIENT
Start: 2024-06-16 | End: 2024-06-16

## 2024-06-16 RX ORDER — ONDANSETRON 4 MG/1
4 TABLET, ORALLY DISINTEGRATING ORAL EVERY 8 HOURS PRN
Qty: 30 TABLET | Refills: 0 | Status: SHIPPED | OUTPATIENT
Start: 2024-06-16

## 2024-06-16 RX ORDER — METOCLOPRAMIDE HYDROCHLORIDE 5 MG/ML
10 INJECTION INTRAMUSCULAR; INTRAVENOUS ONCE
Status: COMPLETED | OUTPATIENT
Start: 2024-06-16 | End: 2024-06-16

## 2024-06-16 RX ORDER — PROMETHAZINE HYDROCHLORIDE 25 MG/1
25 SUPPOSITORY RECTAL EVERY 6 HOURS PRN
Qty: 21 SUPPOSITORY | Refills: 0 | Status: SHIPPED | OUTPATIENT
Start: 2024-06-16

## 2024-06-16 RX ORDER — DIPHENHYDRAMINE HYDROCHLORIDE 50 MG/ML
25 INJECTION INTRAMUSCULAR; INTRAVENOUS ONCE
Status: COMPLETED | OUTPATIENT
Start: 2024-06-16 | End: 2024-06-16

## 2024-06-16 RX ORDER — METHADONE HYDROCHLORIDE 10 MG/ML
170 CONCENTRATE ORAL ONCE
Qty: 17 ML | Refills: 0 | Status: COMPLETED | OUTPATIENT
Start: 2024-06-16 | End: 2024-06-16

## 2024-06-16 RX ORDER — ONDANSETRON 2 MG/ML
4 INJECTION INTRAMUSCULAR; INTRAVENOUS ONCE
Status: COMPLETED | OUTPATIENT
Start: 2024-06-16 | End: 2024-06-16

## 2024-06-16 RX ADMIN — IOPAMIDOL 81 ML: 755 INJECTION, SOLUTION INTRAVENOUS at 09:53

## 2024-06-16 RX ADMIN — METHADONE HYDROCHLORIDE 170 MG: 10 CONCENTRATE ORAL at 09:54

## 2024-06-16 RX ADMIN — SODIUM CHLORIDE 1000 ML: 9 INJECTION, SOLUTION INTRAVENOUS at 09:12

## 2024-06-16 RX ADMIN — DIPHENHYDRAMINE HYDROCHLORIDE 25 MG: 50 INJECTION, SOLUTION INTRAMUSCULAR; INTRAVENOUS at 10:36

## 2024-06-16 RX ADMIN — ONDANSETRON 4 MG: 2 INJECTION INTRAMUSCULAR; INTRAVENOUS at 09:12

## 2024-06-16 RX ADMIN — METOCLOPRAMIDE 10 MG: 5 INJECTION, SOLUTION INTRAMUSCULAR; INTRAVENOUS at 10:36

## 2024-06-16 ASSESSMENT — ACTIVITIES OF DAILY LIVING (ADL)
ADLS_ACUITY_SCORE: 35

## 2024-06-16 ASSESSMENT — COLUMBIA-SUICIDE SEVERITY RATING SCALE - C-SSRS
6. HAVE YOU EVER DONE ANYTHING, STARTED TO DO ANYTHING, OR PREPARED TO DO ANYTHING TO END YOUR LIFE?: NO
1. IN THE PAST MONTH, HAVE YOU WISHED YOU WERE DEAD OR WISHED YOU COULD GO TO SLEEP AND NOT WAKE UP?: NO
2. HAVE YOU ACTUALLY HAD ANY THOUGHTS OF KILLING YOURSELF IN THE PAST MONTH?: NO

## 2024-06-16 NOTE — ED TRIAGE NOTES
Pt to ED from home via private vehicle from home. Pt reports n/v, body aches, chills, headaches, fatigue since Monday. States woke with morning with night sweats and was vomiting. Denies blood in emesis. States history of ulcers and previous abd surgery. 6/10 abd pain which pt reports in chronic. Decreased urination and states very dark when he does urintate  No cough, diarrhea.    States also takes methadone but has been unable to keep the medication down due to n/v.      Triage Assessment (Adult)       Row Name 06/16/24 0855          Triage Assessment    Airway WDL WDL        Respiratory WDL    Respiratory WDL WDL        Skin Circulation/Temperature WDL    Skin Circulation/Temperature WDL WDL        Cardiac WDL    Cardiac WDL WDL        Peripheral/Neurovascular WDL    Peripheral Neurovascular WDL WDL        Cognitive/Neuro/Behavioral WDL    Cognitive/Neuro/Behavioral WDL WDL

## 2024-06-16 NOTE — PHARMACY-MEDICATION REGIMEN REVIEW
Opioid Treatment Program (Methadone Clinic) Information Note      Treatment program name: [facility]  Location (city): [place], [place])   Phone number: [phone]              Spoke with: Kristie     Patient's current methadone dose verified as: 170 mg PO daily   Last dose was administered on: 6/10/24 @6:42 AM   Take-home dose information (if applicable): 6 doses, through 6/16      Note(s): Treatment programs in [place] dispense methadone using the 10 mg/ml oral concentrate.

## 2024-06-16 NOTE — ED NOTES
IV lab, fluids zofran. Pharmacy verifying methadone dose prior to dispensing. Abdomin flat, tender epigastric area, hyperactive bowel sounds. Denies diarrhea

## 2024-06-16 NOTE — ED PROVIDER NOTES
Emergency Department Encounter     Evaluation Date & Time:   6/16/2024  8:40 AM    CHIEF COMPLAINT:  Nausea & Vomiting, Sweats, and Headache      Triage Note:Pt to ED from home via private vehicle from home. Pt reports n/v, body aches, chills, headaches, fatigue since Monday. States woke with morning with night sweats and was vomiting. Denies blood in emesis. States history of ulcers and previous abd surgery. 6/10 abd pain which pt reports in chronic. Decreased urination and states very dark when he does urintate  No cough, diarrhea.    States also takes methadone but has been unable to keep the medication down due to n/v.           ED COURSE & MEDICAL DECISION MAKING:     Pt here with uncontrolled n/v and mid abdominal pain since Friday. States he's woken up with sweats since Monday last week, feeling fatigued with strange taste in mouth.  Pt on methadone for 15 years, difficulty keeping down since Friday due to vomiting.  No treatment for symptoms. Reports chronic abd pain related to remote stomach surgery.  Pt with TTP along abdomen and tachy, likely dehydrated and/or withdrawing a little. Will get labs, CT abd/pelvis, hydrate, treat symptomatically and reassess.  Will give his home dose of methadone as unable to keep down today.    ED Course as of 06/16/24 1511   Sun Jun 16, 2024   1005 Labs all very much reassuring.   1020 CT abd neg for any sort of acute pathology.     1032 Pt re-evaluated, updated, feels improved, able to keep his methadone given here down. Will discharge with zofran, phenergan suppository Rx.         Medical Decision Making    History:  Supplemental history from: Family Member/Significant Other  External Record(s) reviewed: Documented in chart    Work Up:  Chart documentation includes differential considered and any EKGs or imaging independently interpreted by provider, where specified.  In additional to work up documented, I considered the following work up: Documented in chart, if  applicable.    External consultation:  Discussion of management with another provider: Documented in chart, if applicable    Complicating factors:  Care impacted by chronic illness: Other: chronic methadone use  Care affected by social determinants of health: Alcohol Abuse and/or Recreational Drug Use    Disposition considerations: Discharge. I prescribed additional prescription strength medication(s) as charted. See documentation for any additional details.      At the conclusion of the encounter I discussed the results of all the tests and the disposition. The questions were answered. The patient or family acknowledged understanding and was agreeable with the care plan.      MEDICATIONS GIVEN IN THE EMERGENCY DEPARTMENT:  Medications   sodium chloride 0.9% BOLUS 1,000 mL (0 mLs Intravenous Stopped 6/16/24 1022)   ondansetron (ZOFRAN) injection 4 mg (4 mg Intravenous $Given 6/16/24 0912)   methadone (DOLOPHINE-INTENSOL) 10 MG/ML (HIGH CONC) solution 170 mg (170 mg Oral $Given 6/16/24 0954)   iopamidol (ISOVUE-370) solution 81 mL (81 mLs Intravenous $Given 6/16/24 0953)   metoclopramide (REGLAN) injection 10 mg (10 mg Intravenous $Given 6/16/24 1036)   diphenhydrAMINE (BENADRYL) injection 25 mg (25 mg Intravenous $Given 6/16/24 1036)       NEW PRESCRIPTIONS STARTED AT TODAY'S ED VISIT:  Discharge Medication List as of 6/16/2024 11:03 AM        START taking these medications    Details   !! ondansetron (ZOFRAN ODT) 4 MG ODT tab Take 1 tablet (4 mg) by mouth every 8 hours as needed for vomiting or nausea, Disp-30 tablet, R-0, E-Prescribe      promethazine (PHENERGAN) 25 MG suppository Place 1 suppository (25 mg) rectally every 6 hours as needed for nausea, Disp-21 suppository, R-0, E-Prescribe       !! - Potential duplicate medications found. Please discuss with provider.          HPI   HPI     Matt Ramirez is a 38 year old male with a pertinent history of opiate use disorder on methadone for 15 years who presents  to this ED for evaluation of nausea/vomiting.  Pt reports inability to keep methadone down today, vomiting since Friday. States he felt fatigued/off since Monday. Denies bowel changes, known fevers, cough, sore throat, PERSAUD.  Pt has nothing for nausea at home.  States he's had this before in the past, but nothing for past year.  No recent sick contacts.      REVIEW OF SYSTEMS:  See HPI      Medical History     Past Medical History:   Diagnosis Date    Duodenal stenosis     Gastroesophageal reflux disease     Generalized anxiety disorder with panic attacks     Methadone use     Microcytic anemia     Peptic ulcer disease        Past Surgical History:   Procedure Laterality Date    PARTIAL GASTRECTOMY      TENDON REPAIR      left pinky finger       Family History   Problem Relation Age of Onset    Heart Disease Mother     Hypertension Mother     Hyperlipidemia Mother     Anesthesia Reaction Mother         behavioral changes, no personal reaction    Diabetes Father     Hypertension Father     Hyperlipidemia Father     LUNG DISEASE Father     No Known Problems Brother     Diabetes Maternal Grandmother     Cerebrovascular Disease Maternal Grandmother     Deep Vein Thrombosis Maternal Grandmother     No Known Problems Brother     No Known Problems Brother     Colon Cancer No family hx of     Prostate Cancer No family hx of        Social History     Tobacco Use    Smoking status: Former     Current packs/day: 0.00     Types: Cigarettes     Quit date:      Years since quittin.4    Smokeless tobacco: Never   Substance Use Topics    Alcohol use: Not Currently    Drug use: Yes     Comment: Methadone       ondansetron (ZOFRAN ODT) 4 MG ODT tab  promethazine (PHENERGAN) 25 MG suppository  famotidine (PEPCID) 20 MG tablet  gabapentin (NEURONTIN) 300 MG capsule  levETIRAcetam (KEPPRA) 500 MG tablet  meclizine (ANTIVERT) 25 MG tablet  methadone (DOLOPHINE-INTENSOL) 10 MG/ML (HIGH CONC) solution  omeprazole (PRILOSEC) 40 MG   capsule  ondansetron (ZOFRAN ODT) 4 MG ODT tab  ondansetron (ZOFRAN-ODT) 4 MG ODT tab  testosterone cypionate (DEPOTESTOSTERONE) 200 MG/ML injection        Physical Exam     Vitals:  /81   Pulse 78   Temp 98.7  F (37.1  C) (Oral)   Resp 16   Wt 75.5 kg (166 lb 6.4 oz)   SpO2 99%   BMI 22.57 kg/m      PHYSICAL EXAM:   Physical Exam  Vitals and nursing note reviewed.   Constitutional:       General: He is not in acute distress.     Appearance: Normal appearance.   HENT:      Head: Normocephalic and atraumatic.      Nose: Nose normal.      Mouth/Throat:      Mouth: Mucous membranes are moist.   Eyes:      Pupils: Pupils are equal, round, and reactive to light.   Cardiovascular:      Rate and Rhythm: Regular rhythm. Tachycardia present.      Pulses: Normal pulses.           Radial pulses are 2+ on the right side and 2+ on the left side.        Dorsalis pedis pulses are 2+ on the right side and 2+ on the left side.   Pulmonary:      Effort: Pulmonary effort is normal. No respiratory distress.      Breath sounds: Normal breath sounds.   Abdominal:      Palpations: Abdomen is soft.      Tenderness: There is no abdominal tenderness.   Musculoskeletal:      Cervical back: Full passive range of motion without pain and neck supple.      Comments: No calf tenderness or swelling b/l   Skin:     General: Skin is warm.      Findings: No rash.   Neurological:      General: No focal deficit present.      Mental Status: He is alert. Mental status is at baseline.      Comments: Fluent speech, no acute lateralizing deficits   Psychiatric:         Mood and Affect: Mood normal.         Behavior: Behavior normal.           Results     LAB:  All pertinent labs reviewed and interpreted  Labs Ordered and Resulted from Time of ED Arrival to Time of ED Departure   BASIC METABOLIC PANEL - Abnormal       Result Value    Sodium 138      Potassium 4.2      Chloride 99      Carbon Dioxide (CO2) 27      Anion Gap 12      Urea Nitrogen  19.5      Creatinine 0.95      GFR Estimate >90      Calcium 9.6      Glucose 103 (*)    CBC WITH PLATELETS AND DIFFERENTIAL - Abnormal    WBC Count 6.8      RBC Count 5.56      Hemoglobin 14.8      Hematocrit 45.8      MCV 82      MCH 26.6      MCHC 32.3      RDW 15.9 (*)     Platelet Count 265      % Neutrophils 46      % Lymphocytes 43      % Monocytes 8      % Eosinophils 3      % Basophils 1      % Immature Granulocytes 0      NRBCs per 100 WBC 0      Absolute Neutrophils 3.1      Absolute Lymphocytes 2.9      Absolute Monocytes 0.5      Absolute Eosinophils 0.2      Absolute Basophils 0.1      Absolute Immature Granulocytes 0.0      Absolute NRBCs 0.0     HEPATIC FUNCTION PANEL - Normal    Protein Total 7.7      Albumin 4.7      Bilirubin Total 0.2      Alkaline Phosphatase 110      AST 16      ALT 13      Bilirubin Direct <0.20     LIPASE - Normal    Lipase 19     MAGNESIUM - Normal    Magnesium 2.3         RADIOLOGY:  CT Abdomen Pelvis w Contrast   Final Result   IMPRESSION:    1.  Status post gastrojejunostomy. No obstruction, colitis, diverticulitis, or appendicitis.   2.  Mild constipation.   3.  Minimal atherosclerotic vascular disease.                   ECG:  none    PROCEDURES:  Procedures:  none      FINAL IMPRESSION:    ICD-10-CM    1. Nausea and vomiting, unspecified vomiting type  R11.2           0 minutes of critical care time        Joe Hernandez DO  Emergency Medicine  [facility] EMERGENCY DEPARTMENT  6/16/2024  9:30 AM          Joe Hernandez MD  06/16/24 1518

## 2024-06-16 NOTE — DISCHARGE INSTRUCTIONS
Take zofran for nausea.  Phenergan suppositories for inability to keep zofran down orally.  Hydrate well, continue home meds and follow up with primary clinic.

## 2024-07-07 ENCOUNTER — HOSPITAL ENCOUNTER (EMERGENCY)
Facility: CLINIC | Age: 39
Discharge: STILL ADMITTED OR ADMINISTRATIVE | End: 2024-07-07
Attending: EMERGENCY MEDICINE | Admitting: EMERGENCY MEDICINE
Payer: COMMERCIAL

## 2024-07-07 ENCOUNTER — APPOINTMENT (OUTPATIENT)
Dept: CT IMAGING | Facility: CLINIC | Age: 39
End: 2024-07-07
Payer: COMMERCIAL

## 2024-07-07 ENCOUNTER — HOSPITAL ENCOUNTER (EMERGENCY)
Facility: HOSPITAL | Age: 39
Discharge: HOME | End: 2024-07-07
Attending: EMERGENCY MEDICINE | Admitting: EMERGENCY MEDICINE
Payer: COMMERCIAL

## 2024-07-07 ENCOUNTER — APPOINTMENT (OUTPATIENT)
Dept: RADIOLOGY | Facility: CLINIC | Age: 39
End: 2024-07-07
Payer: COMMERCIAL

## 2024-07-07 ENCOUNTER — HOSPITAL ENCOUNTER (EMERGENCY)
Facility: HOSPITAL | Age: 39
Discharge: LEFT BEFORE DISCHARGE | End: 2024-07-07
Attending: EMERGENCY MEDICINE | Admitting: EMERGENCY MEDICINE
Payer: COMMERCIAL

## 2024-07-07 VITALS
RESPIRATION RATE: 18 BRPM | WEIGHT: 170.3 LBS | HEIGHT: 72 IN | HEART RATE: 98 BPM | OXYGEN SATURATION: 97 % | BODY MASS INDEX: 23.07 KG/M2 | TEMPERATURE: 98.8 F | DIASTOLIC BLOOD PRESSURE: 96 MMHG | SYSTOLIC BLOOD PRESSURE: 142 MMHG

## 2024-07-07 VITALS
HEART RATE: 80 BPM | SYSTOLIC BLOOD PRESSURE: 138 MMHG | BODY MASS INDEX: 23.06 KG/M2 | RESPIRATION RATE: 20 BRPM | TEMPERATURE: 98.3 F | DIASTOLIC BLOOD PRESSURE: 88 MMHG | OXYGEN SATURATION: 98 % | WEIGHT: 170 LBS

## 2024-07-07 VITALS
SYSTOLIC BLOOD PRESSURE: 143 MMHG | RESPIRATION RATE: 18 BRPM | TEMPERATURE: 98.8 F | HEART RATE: 102 BPM | WEIGHT: 171 LBS | OXYGEN SATURATION: 97 % | DIASTOLIC BLOOD PRESSURE: 91 MMHG | BODY MASS INDEX: 23.19 KG/M2

## 2024-07-07 DIAGNOSIS — Z76.5 DRUG-SEEKING BEHAVIOR: ICD-10-CM

## 2024-07-07 DIAGNOSIS — R11.2 NAUSEA AND VOMITING, UNSPECIFIED VOMITING TYPE: ICD-10-CM

## 2024-07-07 DIAGNOSIS — Z91.138 MEDICATION DOSE MISSED: ICD-10-CM

## 2024-07-07 DIAGNOSIS — R10.13 EPIGASTRIC PAIN: ICD-10-CM

## 2024-07-07 LAB
ALBUMIN SERPL BCG-MCNC: 4.7 G/DL (ref 3.5–5.2)
ALP SERPL-CCNC: 116 U/L (ref 40–150)
ALT SERPL W P-5'-P-CCNC: 16 U/L (ref 0–70)
ANION GAP SERPL CALCULATED.3IONS-SCNC: 12 MMOL/L (ref 7–15)
AST SERPL W P-5'-P-CCNC: 19 U/L (ref 0–45)
BASOPHILS # BLD AUTO: 0 10E3/UL (ref 0–0.2)
BASOPHILS NFR BLD AUTO: 0 %
BILIRUB SERPL-MCNC: <0.2 MG/DL
BUN SERPL-MCNC: 9.8 MG/DL (ref 6–20)
CALCIUM SERPL-MCNC: 9.8 MG/DL (ref 8.6–10)
CHLORIDE SERPL-SCNC: 103 MMOL/L (ref 98–107)
CREAT SERPL-MCNC: 0.75 MG/DL (ref 0.67–1.17)
DEPRECATED HCO3 PLAS-SCNC: 24 MMOL/L (ref 22–29)
EGFRCR SERPLBLD CKD-EPI 2021: >90 ML/MIN/1.73M2
EOSINOPHIL # BLD AUTO: 0.1 10E3/UL (ref 0–0.7)
EOSINOPHIL NFR BLD AUTO: 1 %
ERYTHROCYTE [DISTWIDTH] IN BLOOD BY AUTOMATED COUNT: 17.1 % (ref 10–15)
GLUCOSE SERPL-MCNC: 110 MG/DL (ref 70–99)
HCT VFR BLD AUTO: 41.5 % (ref 40–53)
HGB BLD-MCNC: 13.8 G/DL (ref 13.3–17.7)
IMM GRANULOCYTES # BLD: 0.1 10E3/UL
IMM GRANULOCYTES NFR BLD: 1 %
LIPASE SERPL-CCNC: 15 U/L (ref 13–60)
LYMPHOCYTES # BLD AUTO: 1.1 10E3/UL (ref 0.8–5.3)
LYMPHOCYTES NFR BLD AUTO: 10 %
MCH RBC QN AUTO: 26.5 PG (ref 26.5–33)
MCHC RBC AUTO-ENTMCNC: 33.3 G/DL (ref 31.5–36.5)
MCV RBC AUTO: 80 FL (ref 78–100)
MONOCYTES # BLD AUTO: 0.6 10E3/UL (ref 0–1.3)
MONOCYTES NFR BLD AUTO: 5 %
NEUTROPHILS # BLD AUTO: 9.1 10E3/UL (ref 1.6–8.3)
NEUTROPHILS NFR BLD AUTO: 84 %
NRBC # BLD AUTO: 0 10E3/UL
NRBC BLD AUTO-RTO: 0 /100
PLATELET # BLD AUTO: 226 10E3/UL (ref 150–450)
POTASSIUM SERPL-SCNC: 4.1 MMOL/L (ref 3.4–5.3)
PROT SERPL-MCNC: 7.6 G/DL (ref 6.4–8.3)
RBC # BLD AUTO: 5.2 10E6/UL (ref 4.4–5.9)
SODIUM SERPL-SCNC: 139 MMOL/L (ref 135–145)
WBC # BLD AUTO: 10.9 10E3/UL (ref 4–11)

## 2024-07-07 PROCEDURE — 36415 COLL VENOUS BLD VENIPUNCTURE: CPT

## 2024-07-07 PROCEDURE — 74177 CT ABD & PELVIS W/CONTRAST: CPT

## 2024-07-07 PROCEDURE — 250N000011 HC RX IP 250 OP 636

## 2024-07-07 PROCEDURE — 96361 HYDRATE IV INFUSION ADD-ON: CPT

## 2024-07-07 PROCEDURE — 83690 ASSAY OF LIPASE: CPT

## 2024-07-07 PROCEDURE — 99285 EMERGENCY DEPT VISIT HI MDM: CPT | Mod: 25

## 2024-07-07 PROCEDURE — 258N000003 HC RX IP 258 OP 636

## 2024-07-07 PROCEDURE — 80053 COMPREHEN METABOLIC PANEL: CPT

## 2024-07-07 PROCEDURE — 250N000011 HC RX IP 250 OP 636: Performed by: EMERGENCY MEDICINE

## 2024-07-07 PROCEDURE — 85025 COMPLETE CBC W/AUTO DIFF WBC: CPT

## 2024-07-07 PROCEDURE — 71046 X-RAY EXAM CHEST 2 VIEWS: CPT

## 2024-07-07 PROCEDURE — 96374 THER/PROPH/DIAG INJ IV PUSH: CPT | Mod: 59

## 2024-07-07 PROCEDURE — 99282 EMERGENCY DEPT VISIT SF MDM: CPT

## 2024-07-07 PROCEDURE — 99282 EMERGENCY DEPT VISIT SF MDM: CPT | Mod: 27

## 2024-07-07 PROCEDURE — 250N000013 HC RX MED GY IP 250 OP 250 PS 637: Performed by: EMERGENCY MEDICINE

## 2024-07-07 RX ORDER — DROPERIDOL 2.5 MG/ML
1.25 INJECTION, SOLUTION INTRAMUSCULAR; INTRAVENOUS ONCE
Status: COMPLETED | OUTPATIENT
Start: 2024-07-07 | End: 2024-07-07

## 2024-07-07 RX ORDER — DROPERIDOL 2.5 MG/ML
1.25 INJECTION, SOLUTION INTRAMUSCULAR; INTRAVENOUS ONCE
Status: DISCONTINUED | OUTPATIENT
Start: 2024-07-07 | End: 2024-07-07 | Stop reason: HOSPADM

## 2024-07-07 RX ORDER — IOPAMIDOL 755 MG/ML
90 INJECTION, SOLUTION INTRAVASCULAR ONCE
Status: COMPLETED | OUTPATIENT
Start: 2024-07-07 | End: 2024-07-07

## 2024-07-07 RX ORDER — GABAPENTIN 300 MG/1
600 CAPSULE ORAL ONCE
Status: COMPLETED | OUTPATIENT
Start: 2024-07-07 | End: 2024-07-07

## 2024-07-07 RX ADMIN — SODIUM CHLORIDE 1000 ML: 9 INJECTION, SOLUTION INTRAVENOUS at 16:00

## 2024-07-07 RX ADMIN — GABAPENTIN 600 MG: 300 CAPSULE ORAL at 16:52

## 2024-07-07 RX ADMIN — IOPAMIDOL 90 ML: 755 INJECTION, SOLUTION INTRAVENOUS at 16:30

## 2024-07-07 RX ADMIN — DROPERIDOL 1.25 MG: 2.5 INJECTION, SOLUTION INTRAMUSCULAR; INTRAVENOUS at 15:55

## 2024-07-07 ASSESSMENT — COLUMBIA-SUICIDE SEVERITY RATING SCALE - C-SSRS
6. HAVE YOU EVER DONE ANYTHING, STARTED TO DO ANYTHING, OR PREPARED TO DO ANYTHING TO END YOUR LIFE?: NO
6. HAVE YOU EVER DONE ANYTHING, STARTED TO DO ANYTHING, OR PREPARED TO DO ANYTHING TO END YOUR LIFE?: NO
2. HAVE YOU ACTUALLY HAD ANY THOUGHTS OF KILLING YOURSELF IN THE PAST MONTH?: NO
1. IN THE PAST MONTH, HAVE YOU WISHED YOU WERE DEAD OR WISHED YOU COULD GO TO SLEEP AND NOT WAKE UP?: NO
2. HAVE YOU ACTUALLY HAD ANY THOUGHTS OF KILLING YOURSELF IN THE PAST MONTH?: NO
1. IN THE PAST MONTH, HAVE YOU WISHED YOU WERE DEAD OR WISHED YOU COULD GO TO SLEEP AND NOT WAKE UP?: NO
2. HAVE YOU ACTUALLY HAD ANY THOUGHTS OF KILLING YOURSELF IN THE PAST MONTH?: NO
1. IN THE PAST MONTH, HAVE YOU WISHED YOU WERE DEAD OR WISHED YOU COULD GO TO SLEEP AND NOT WAKE UP?: NO
6. HAVE YOU EVER DONE ANYTHING, STARTED TO DO ANYTHING, OR PREPARED TO DO ANYTHING TO END YOUR LIFE?: NO

## 2024-07-07 ASSESSMENT — ACTIVITIES OF DAILY LIVING (ADL)
ADLS_ACUITY_SCORE: 35

## 2024-07-07 NOTE — DISCHARGE INSTRUCTIONS
Substance Use Treatment (Rule 25) Information -     To access chemical dependency treatment you must have an assessment complete or have an updated assessment within 30 days of starting any program.    Information for this to be completed and to secure funding if you have medical assistance or no insurance can be found through your [facility]'s chemical health intake line.      If you have private insurance contact the customer service number on the back of your insurance card to determine the required steps for accessing services through your insurance provider.     [facility] RULE 25 INFORMATION -   [place] - [phone]  [place]  [phone]  [facility] [phone]  [facility] [phone]  [facility] [phone]  [place] - [phone]  [place] - [phone]  [facility] [phone]      Once approved for funding you can connect with a facility that does Rule 25 assessment.     The following facilities offer Rule 25 assessments -     [facility]  [phone]  [facility] - Outpatient Mental Health and Addiction   [address] [facility] [address]    [facility] ([facility])  [phone]  [address] [place] [address]  *Walk in assessments also available M-F starting at 8 am.     [facility]  [phone]  [address] [facility] [address]       [facility]   [phone]  [facility]  [address] [facility] [place] [facility] [address]    [facility]   [phone]  [address] [facility] [address]    [facility]  [phone] - press 1  Multiple clinic locations    [facility]   [phone]  [address] [place] [address] E  [facility] [address]    [facility]  [phone]  [address] [facility] [address]    [facility]  [phone]  [address] [facility] [address]          If you are intoxicated  You may be required to detox at a detox facility  before starting treatment.    [facility]- [address] [facility].  [place], [place].    [phone]    [facility]: [phone]  [facility]: [phone]  [facility]: [phone]      *All information subject to change by facility- connect by phone prior to arrival to verify available services. Information listed is not an endorsement of particular program and is populated using a few of the available resources in the area.     Additional support while to provide support while you await assessment and any recommendations-  [facility] is providing telehealth services during the COVID-19 outbreak to ensure ongoing access to high-quality treatment for substance use and co-occurring mental health conditions.  COVID-19 poses unprecedented challenges to clients and providers. [facility], one of [place] s largest non-profit extended-care behavioral healthcare providers, successfully transitioned more than nine hundred outpatient clients to telehealth. Its licensed clinicians are delivering individual and group therapy via computers, mobile devices, and phones.  [facility] is now extending telehealth services to any adult [place] resident to ensure uninterrupted access to crucial care during this challenging time.  [facility] is accredited by The Joint Commission and utilizes evidence-based care   In-network with most major insurance companies, accept Medical Assistance (i.e. MA/PMAP/CCDTF), and private pay   Conducting virtual assessments and intakes   Individual and group telehealth sessions available   Specialized groups and trauma treatment available   Employing secure telehealth platforms   Partnering with recovery residences in the [facility], [facility], and other [facility] to help clients access high-quality recovery housing when clinically indicated.  Refer a client by contacting a [facility] admissions representative using the following information:  [facility] ([address] [place] [address])   Contact: Margi Greenwood, Admissions Supervisor ([email or web address])   Office: [phone] Fax: [phone]  [facility] ([address] [place] [address])   Contact: Margi Greenwood, Admissions Supervisor ([email or web address])   Office: [phone] Fax: [phone]  [address] [place] [address] ([address] [place] [address])   Contact: Jane Rivera, Admissions Supervisor ([email or web address])   Office: [phone] Fax: [phone]  [address] [place] [address] ([address] [place] [address])   Contact: Maria Esther Rodney, Admissions Supervisor ([email or web address])   Office: [phone] Fax: [phone]

## 2024-07-07 NOTE — ED PROVIDER NOTES
Emergency Department Midlevel Supervisory Note     I had a face to face encounter with this patient seen by the Advanced Practice Provider (DILEEP). I personally made/approved the management plan and take responsibility for the patient management. I personally saw patient and performed a substantive portion of the visit including all aspects of the medical decision making.     ED Course:  4:21 PM  MD Cheri resident  staffed patient with me. I agree with their assessment and plan of management, and I will see the patient.  4:21 PM I met with the patient to introduce myself, gather additional history, perform my initial exam, and discuss the plan.   7:18 PM Patient apparently left prior to second dose of droperidol. He did not inform anyone he was leaving. JOANIE Fontaine calling the patient to ask him to come back, he left with his IV and at minimum, we would like him to return for removal of IV.     Procedures:  I was present for the key portions of procedures documented in DILEEP/midlevel note, see midlevel note for further details.    MDM:  Patient presents with nausea, vomiting.  Patient has missed his last 2 days of methadone given his symptoms.  He has also missed his gabapentin.  Patient with ongoing abdominal pain.  CT scan of the abdomen pelvis is unremarkable. Concern for possible withdrawal symptoms, will continue with supportive management and re-assess for improvement.     ED Course as of 07/07/24 1917   Sun Jul 07, 2024   1650 CMP, CBC, lipase without significant abnormalities   1724 CT Abdomen Pelvis w Contrast  IMPRESSION:   1.  No acute findings or inflammatory changes in the abdomen or pelvis.     2.  Small to moderate amount of stool throughout the colon. No bowel obstruction.     3.  Scattered bibasilar groundglass opacities, likely infectious or inflammatory.     1758 Chest XR,  PA & LAT  IMPRESSION: Groundglass opacities visualized on CT are not well visualized on the chest radiograph. Findings likely  reflect infectious or inflammatory etiology. No pneumothorax or pleural effusion. Cardiac silhouette within normal limits. No acute   osseous abnormality.     1816 Second dose of droperidol to be given, patient endorses some improvement in his symptoms before but not totally.  States that he has had known groundglass opacifications in his lungs previously from diffuse pneumonias.  Does not have any respiratory symptoms right now.  Declined any antibiotics currently.  Will give another dose of droperidol and reevaluate.   1913 Patient unable to be located.  Unsure if he left with his IV and or not.  Did not have it removed per nursing.   1916 Nursing to contact the patient to return for IV removal.       1. Epigastric pain    2. Nausea and vomiting, unspecified vomiting type    3. Medication dose missed        Brief HPI:     Matt Ramirez is a 38 year old male who presents for evaluation of nausea, vomiting, epigastric abdominal pain.  He normally takes Zofran but has ran out of his medication.    I, Leslie Nick, am serving as a scribe to document services personally performed by Randee Evans MD., based on my observations and the provider's statements to me.   I, Randee Evans MD attest that Leslie Nick was acting in a scribe capacity, has observed my performance of the services and has documented them in accordance with my direction.    Brief Physical Exam: /88   Pulse 80   Temp 98.3  F (36.8  C) (Oral)   Resp 20   Wt 77.1 kg (170 lb)   SpO2 98%   BMI 23.06 kg/m    Physical Exam      Labs and Imaging:  Results for orders placed or performed during the hospital encounter of 07/07/24   CT Abdomen Pelvis w Contrast    Impression    IMPRESSION:   1.  No acute findings or inflammatory changes in the abdomen or pelvis.    2.  Small to moderate amount of stool throughout the colon. No bowel obstruction.    3.  Scattered bibasilar groundglass opacities, likely infectious or inflammatory.    Comprehensive metabolic panel   Result Value Ref Range    Sodium 139 135 - 145 mmol/L    Potassium 4.1 3.4 - 5.3 mmol/L    Carbon Dioxide (CO2) 24 22 - 29 mmol/L    Anion Gap 12 7 - 15 mmol/L    Urea Nitrogen 9.8 6.0 - 20.0 mg/dL    Creatinine 0.75 0.67 - 1.17 mg/dL    GFR Estimate >90 >60 mL/min/1.73m2    Calcium 9.8 8.6 - 10.0 mg/dL    Chloride 103 98 - 107 mmol/L    Glucose 110 (H) 70 - 99 mg/dL    Alkaline Phosphatase 116 40 - 150 U/L    AST 19 0 - 45 U/L    ALT 16 0 - 70 U/L    Protein Total 7.6 6.4 - 8.3 g/dL    Albumin 4.7 3.5 - 5.2 g/dL    Bilirubin Total <0.2 <=1.2 mg/dL   Result Value Ref Range    Lipase 15 13 - 60 U/L   CBC with platelets and differential   Result Value Ref Range    WBC Count 10.9 4.0 - 11.0 10e3/uL    RBC Count 5.20 4.40 - 5.90 10e6/uL    Hemoglobin 13.8 13.3 - 17.7 g/dL    Hematocrit 41.5 40.0 - 53.0 %    MCV 80 78 - 100 fL    MCH 26.5 26.5 - 33.0 pg    MCHC 33.3 31.5 - 36.5 g/dL    RDW 17.1 (H) 10.0 - 15.0 %    Platelet Count 226 150 - 450 10e3/uL    % Neutrophils 84 %    % Lymphocytes 10 %    % Monocytes 5 %    % Eosinophils 1 %    % Basophils 0 %    % Immature Granulocytes 1 %    NRBCs per 100 WBC 0 <1 /100    Absolute Neutrophils 9.1 (H) 1.6 - 8.3 10e3/uL    Absolute Lymphocytes 1.1 0.8 - 5.3 10e3/uL    Absolute Monocytes 0.6 0.0 - 1.3 10e3/uL    Absolute Eosinophils 0.1 0.0 - 0.7 10e3/uL    Absolute Basophils 0.0 0.0 - 0.2 10e3/uL    Absolute Immature Granulocytes 0.1 <=0.4 10e3/uL    Absolute NRBCs 0.0 10e3/uL         Randee Evans MD  [facility] EMERGENCY ROOM  [address] [facility] [id number] [phone]       Randee Evans MD  07/07/24 1918

## 2024-07-07 NOTE — ED PROVIDER NOTES
"EMERGENCY DEPARTMENT ENCOUNTER      NAME: Matt Ramirez  AGE: 38 year old male  YOB: 1985  MRN: [id number]  EVALUATION DATE & TIME: 7/7/2024 11:08 AM    PCP: Aaron Hawley    ED PROVIDER: Clarice Thomas M.D.      Chief Complaint   Patient presents with    Abdominal Pain    Nausea, Vomiting, & Diarrhea         FINAL IMPRESSION:  1. Drug-seeking behavior          ED COURSE & MEDICAL DECISION MAKING:    ED Course as of 07/07/24 1129   Sun Jul 07, 2024   1124 Patient presents stating he gets his methadone at a methadone clinic but then after leaving says he vomits it up with chronic gastritis and epigastric discomfort. He was offered suboxone therapy as he reports he vomited up \"all\" his methadone for 3 days and in the past \"they always give me methadone\" when he goes to ERs for this, he refuses change onto suboxone. I offer suboxone and note to him (with normal pupils and no diaphoresis) that he will not withdraw if he hasn't had methadone for 3 days and would be safe, he then expresses he doesn't know if he has kept down his methadone or not, doesn't want ot withdraw. I offered monitored start to suboxone, prescription for suboxone, change from having to go to methadone clinic and being able to use pills or films of suboxone going forward in the future   In lieu of methadone and able to travel with it and carry that prescription with him, no further issues with possible missed dosing due to vomiting as it is film that dissolves in cheek. I also offered blood testing and imaging to ensure abdomen does not feature acute pathology, he angrily leaves the ER when methadone administration alternative was offered including symptomatic therapy with fluid, zofran, bentyl, gabapentin, suboxone if he accepts it and notes that if I won't give him methadone right now \"then I will walk the streets!\", left the ER angrily and slammed the door, threatened \"I'll just come back in an hour\".     Pertinent Labs & " "Imaging studies reviewed. (See chart for details)    N95 worn  A face shield was worn also  COVID PPE    Medical Decision Making  Obtained supplemental history:Supplemental history obtained?: No  Reviewed external records: External records reviewed?: No  Care impacted by chronic illness:Other: Methamphetamine abuse  Care significantly affected by social determinants of health:Alcohol Abuse and/or Recreational Drug Use  Did you consider but not order tests?: Work up considered but not performed and documented in chart, if applicable  Did you interpret images independently?: Independent interpretation of ECG and images noted in documentation, when applicable.  Consultation discussion with other provider:Did you involve another provider (consultant, , pharmacy, etc.)?: No  Left AMA    At the conclusion of the encounter I discussed the results of all of the tests and the disposition. The questions were answered. The patient or family acknowledged understanding and was agreeable with the care plan.     MEDICATIONS GIVEN IN THE EMERGENCY:  Medications - No data to display    NEW PRESCRIPTIONS STARTED AT TODAY'S ER VISIT  Discharge Medication List as of 7/7/2024 11:25 AM             =================================================================    HPI      Matt Ramirez is a 38 year old male with PMHx of methadone use, methamphetamine abuse, gastrointestinal obstruction, who presents to the ED today via walk in with abdominal pain.     Patient reports on and off vomiting for two weeks. He said he has not been able to keep his methadone down and can't get his next dose. He complains of mid to upper abdomen pain, described as dull, rates the pain as 7/10. Patient stormed out of the room and said forget it, \"I will just get dope off the streets.\"       REVIEW OF SYSTEMS   All other systems reviewed and are negative except as noted above in HPI.    PAST MEDICAL HISTORY:  Past Medical History:   Diagnosis Date    " Duodenal stenosis     Gastroesophageal reflux disease     Generalized anxiety disorder with panic attacks     Methadone use     Microcytic anemia     Peptic ulcer disease        PAST SURGICAL HISTORY:  Past Surgical History:   Procedure Laterality Date    PARTIAL GASTRECTOMY      TENDON REPAIR      left pinky finger       CURRENT MEDICATIONS:    famotidine (PEPCID) 20 MG tablet  gabapentin (NEURONTIN) 300 MG capsule  levETIRAcetam (KEPPRA) 500 MG tablet  meclizine (ANTIVERT) 25 MG tablet  methadone (DOLOPHINE-INTENSOL) 10 MG/ML (HIGH CONC) solution  omeprazole (PRILOSEC) 40 MG DR capsule  ondansetron (ZOFRAN ODT) 4 MG ODT tab  ondansetron (ZOFRAN ODT) 4 MG ODT tab  ondansetron (ZOFRAN-ODT) 4 MG ODT tab  promethazine (PHENERGAN) 25 MG suppository  testosterone cypionate (DEPOTESTOSTERONE) 200 MG/ML injection        ALLERGIES:  Allergies   Allergen Reactions    Aspirin        FAMILY HISTORY:  Family History   Problem Relation Age of Onset    Heart Disease Mother     Hypertension Mother     Hyperlipidemia Mother     Anesthesia Reaction Mother         behavioral changes, no personal reaction    Diabetes Father     Hypertension Father     Hyperlipidemia Father     LUNG DISEASE Father     No Known Problems Brother     Diabetes Maternal Grandmother     Cerebrovascular Disease Maternal Grandmother     Deep Vein Thrombosis Maternal Grandmother     No Known Problems Brother     No Known Problems Brother     Colon Cancer No family hx of     Prostate Cancer No family hx of        SOCIAL HISTORY:   Social History     Socioeconomic History    Marital status:    Tobacco Use    Smoking status: Former     Current packs/day: 0.00     Types: Cigarettes     Quit date:      Years since quittin.5    Smokeless tobacco: Never   Substance and Sexual Activity    Alcohol use: Not Currently    Drug use: Yes     Comment: Methadone       VITALS:  Patient Vitals for the past 24 hrs:   BP Temp Temp src Pulse Resp SpO2 Weight    07/07/24 1104 (!) 143/91 98.8  F (37.1  C) Oral 102 18 97 % 77.6 kg (171 lb)       PHYSICAL EXAM    GENERAL: Awake, alert.  In no acute distress.   HEENT: Normocephalic, atraumatic.  Pupils equal, round and reactive.  Conjunctiva normal.  EOMI.  NECK: No stridor or apparent deformity.  EXTREMITIES: No lower extremity swelling or edema.    NEURO: Alert and oriented to person, place and time.  Cranial nerves grossly intact.  No focal motor deficit.  PSYCH: Aggressive affect  SKIN: No rashes          I, Jasmine Diaz, am serving as a scribe to document services personally performed by Dr. Clarice Thomas based on my observation and the provider's statements to me. IClarice MD attest that Jasmine Diaz is acting in a scribe capacity, has observed my performance of the services and has documented them in accordance with my direction.       Clarice Thomas MD  07/07/24 6504     No

## 2024-07-07 NOTE — ED TRIAGE NOTES
Pt to ED with complaint of abd pain, n/v/d and being unable to keep his methadone dose down.    Pt reports chronic abd pain from previous abd surgery. States has been vomiting and unable to keep methadone dose down which pt reports is complicating his abd pain and n/v  Current pain 7/10. Reports small amount of blood in vomit. Denies blood in stool  Denies CP, diff breathing,   Hx ulcers      Triage Assessment (Adult)       Row Name 07/07/24 1109          Triage Assessment    Airway WDL WDL        Respiratory WDL    Respiratory WDL WDL        Skin Circulation/Temperature WDL    Skin Circulation/Temperature WDL WDL        Cardiac WDL    Cardiac WDL WDL        Peripheral/Neurovascular WDL    Peripheral Neurovascular WDL WDL        Cognitive/Neuro/Behavioral WDL    Cognitive/Neuro/Behavioral WDL WDL

## 2024-07-07 NOTE — ED PROVIDER NOTES
"EMERGENCY DEPARTMENT ENCOUNTER      NAME: Matt Ramirez  AGE: 38 year old male  YOB: 1985  MRN: [id number]  EVALUATION DATE & TIME: No admission date for patient encounter.    PCP: Aaron Hawley    ED PROVIDER: Clarice Thomas M.D.      Chief Complaint   Patient presents with    Nausea & Vomiting         FINAL IMPRESSION:  1. Drug-seeking behavior          ED COURSE & MEDICAL DECISION MAKING:    ED Course as of 07/07/24 1636   Sun Jul 07, 2024   1223 Patient returned to the ED with his wife, seen by me in waiting room region privately in alcove given outburst earlier, for safety. He reports he is certian he vomited up his methadone for the last three days and has none in his system therefore he wants methadone. I again offered suboxone, noting that if he has been off methadone for three days he is unlikely to have withdrawal. His wife then asserts that if he has symtpomso f withdrawal on suboxone \"he will die from it\", I gently explained that opioid withdrawal will not kill him and we can appropriately dose suboxone if he wants, happy to offer it but I cannot and will not give unless he wants to switch onto that therapy going forward. He epxresses he only wants methadone, no suboxone because he says he was told by the methadone clinic he would need to go to detox first to safely change from one onto the other. I then offer again labs, CT abdomen and medical stabilization, symtpomatic management and then when we can get his symtpoms under control and ensure he is medically well and stable, I can prescribe antiemitic and symtpomatic therapy for him also and additionally am happy to call detox for him to get him into detox if he wants that, he refuses and says that if I am not going to give him methadone, he refuses any other care and wants to again leave, then him and his wife walked out of the ER quickly.        Pertinent Labs & Imaging studies reviewed. (See chart for details)    N95 worn  A " face shield was worn also  COVID PPE    Medical Decision Making  Obtained supplemental history:Supplemental history obtained?: No  Reviewed external records: External records reviewed?: Documented in chart  Care impacted by chronic illness:Smoking / Nicotine Use and Other: Methamphetamine abuse  Care significantly affected by social determinants of health:Alcohol Abuse and/or Recreational Drug Use  Did you consider but not order tests?: Work up considered but not performed and documented in chart, if applicable  Did you interpret images independently?: Independent interpretation of ECG and images noted in documentation, when applicable.  Consultation discussion with other provider:Did you involve another provider (consultant, , pharmacy, etc.)?: No  Left AMA without esting    At the conclusion of the encounter I discussed the results of all of the tests and the disposition. The questions were answered. The patient or family acknowledged understanding and was agreeable with the care plan.     MEDICATIONS GIVEN IN THE EMERGENCY:  Medications - No data to display    NEW PRESCRIPTIONS STARTED AT TODAY'S ER VISIT  Discharge Medication List as of 7/7/2024 12:51 PM             =================================================================    HPI      Matt Ramirez is a 38 year old male with PMHx of methadone use, methamphetamine abuse who presents to the ED today via walk in with nausea and vomiting.     Patient seen in ED 1 hour prior and left AMA. He is here again for similar concerns and is seeking help with missed methadone dose and nausea. He said he has been on methadone for 15 years and is open to switching to suboxone, but is scared about the affects and going through withdrawal. He said he would take suboxone if it was guaranteed that it would work. Wife was with him and they left after being offered to be medically cleared.     Per review 7/7/2024, [place]'s ED visit for abdominal pain. Patient with  abdominal pain and nausea. Here seeking help with missed methadone dose. Stormed out of room and left AMA after disagreeing with medical treatment/help.       REVIEW OF SYSTEMS   All other systems reviewed and are negative except as noted above in HPI.    PAST MEDICAL HISTORY:  Past Medical History:   Diagnosis Date    Duodenal stenosis     Gastroesophageal reflux disease     Generalized anxiety disorder with panic attacks     Methadone use     Microcytic anemia     Peptic ulcer disease        PAST SURGICAL HISTORY:  Past Surgical History:   Procedure Laterality Date    PARTIAL GASTRECTOMY      TENDON REPAIR      left pinky finger       CURRENT MEDICATIONS:    famotidine (PEPCID) 20 MG tablet  gabapentin (NEURONTIN) 300 MG capsule  levETIRAcetam (KEPPRA) 500 MG tablet  meclizine (ANTIVERT) 25 MG tablet  methadone (DOLOPHINE-INTENSOL) 10 MG/ML (HIGH CONC) solution  omeprazole (PRILOSEC) 40 MG DR capsule  ondansetron (ZOFRAN ODT) 4 MG ODT tab  ondansetron (ZOFRAN ODT) 4 MG ODT tab  ondansetron (ZOFRAN-ODT) 4 MG ODT tab  promethazine (PHENERGAN) 25 MG suppository  testosterone cypionate (DEPOTESTOSTERONE) 200 MG/ML injection        ALLERGIES:  Allergies   Allergen Reactions    Aspirin GI Disturbance     ulcers       FAMILY HISTORY:  Family History   Problem Relation Age of Onset    Heart Disease Mother     Hypertension Mother     Hyperlipidemia Mother     Anesthesia Reaction Mother         behavioral changes, no personal reaction    Diabetes Father     Hypertension Father     Hyperlipidemia Father     LUNG DISEASE Father     No Known Problems Brother     Diabetes Maternal Grandmother     Cerebrovascular Disease Maternal Grandmother     Deep Vein Thrombosis Maternal Grandmother     No Known Problems Brother     No Known Problems Brother     Colon Cancer No family hx of     Prostate Cancer No family hx of        SOCIAL HISTORY:   Social History     Socioeconomic History    Marital status:    Tobacco Use    Smoking  status: Former     Current packs/day: 0.00     Types: Cigarettes     Quit date: 2019     Years since quittin.5    Smokeless tobacco: Never   Substance and Sexual Activity    Alcohol use: Not Currently    Drug use: Yes     Comment: Methadone       VITALS:  Patient Vitals for the past 24 hrs:   BP Temp Temp src Pulse Resp SpO2 Height Weight   24 1152 (!) 142/96 98.8  F (37.1  C) Oral 98 18 97 % 1.829 m (6') 77.2 kg (170 lb 4.8 oz)       PHYSICAL EXAM    GENERAL: Awake, alert.  In no acute distress.   HEENT: Normocephalic, atraumatic.  Pupils equal, round and reactive.  Conjunctiva normal.  EOMI.  NECK: No stridor or apparent deformity.  PULMONARY: Symmetrical breath sounds without distress.  Lungs clear to auscultation bilaterally without wheezes, rhonchi or rales.  CARDIO: Regular rate and rhythm.  No significant murmur, rub or gallop.  Radial pulses strong and symmetrical.  ABDOMINAL: Abdomen soft, non-distended and non-tender to palpation.  No CVAT, no palpable hepatosplenomegaly.  EXTREMITIES: No lower extremity swelling or edema.    NEURO: Alert and oriented to person, place and time.  Cranial nerves grossly intact.  No focal motor deficit.  PSYCH: Normal mood and affect  SKIN: No rashes           I, Jasmine Diaz, am serving as a scribe to document services personally performed by Dr. Clarice Thomas based on my observation and the provider's statements to me. IClarice MD attest that Jasmine Diaz is acting in a scribe capacity, has observed my performance of the services and has documented them in accordance with my direction.       Clarice Thomas MD  24 [id number]

## 2024-07-07 NOTE — ED PROVIDER NOTES
EMERGENCY DEPARTMENT ENCOUNTER      NAME: Matt Ramirez  AGE: 38 year old male  YOB: 1985  MRN: [id number]  EVALUATION DATE & TIME: No admission date for patient encounter.    PCP: Aaron Hawley    ED PROVIDER: Hipolito Garcias MD PGY3    Chief Complaint   Patient presents with    Vomiting     FINAL IMPRESSION:  1. Epigastric pain    2. Nausea and vomiting, unspecified vomiting type    3. Medication dose missed        ED COURSE & MEDICAL DECISION MAKING:    Pertinent Labs & Imaging studies reviewed. (See chart for details)  38 year old male with history of peptic ulcer disease, medical maintenance therapy, who presents to the Emergency Department for evaluation of abdominal pain and nausea vomiting.  Vitals stable arrival.  Physical exam, patient distressed appearing secondary to ongoing nausea vomiting.  Hunched over.  Does have diffuse epigastric abdominal tenderness, no guarding or rebound.  No flank tenderness. Consider perforation, AAA but less likely given age and vitals. Mesenteric ischemia considered however no history of arrhythmias, pain does not appear out of proportion to examination. Surgical abdomen including cholecystic, hepatic, pancreatic pathology considered. SBO high on the differential given history of bowel movement concerns.  Low concern for acute SBP, IBS. Unlikely to be UTI, pyelonephritis, psoas abscess, diverticulitis. Ureteral colic considered but no flank pain.  No chest symptoms to suggest possible MI/ACS/PE.  On chart review, patient was seen at [facility] earlier today by Dr. Ferris.  He declined any lab work or imaging at that time.  Requested symptomatic management and discharge.  On repeat discussion with him today, he is okay with workup for acute abdominal causes for his ongoing symptoms.  We did discuss unlikely to be able to get him in methadone, he does follow-up with his clinic tomorrow and requested symptomatic management here.  Zofran had worked well for  him, given his pain though will trial some droperidol here and IV fluid.  Plan for labs, CT imaging of the abdomen pelvis.    ED Course as of 07/07/24 1935   Sun Jul 07, 2024   1650 CMP, CBC, lipase without significant abnormalities   1724 CT Abdomen Pelvis w Contrast  IMPRESSION:   1.  No acute findings or inflammatory changes in the abdomen or pelvis.     2.  Small to moderate amount of stool throughout the colon. No bowel obstruction.     3.  Scattered bibasilar groundglass opacities, likely infectious or inflammatory.     1758 Chest XR,  PA & LAT  IMPRESSION: Groundglass opacities visualized on CT are not well visualized on the chest radiograph. Findings likely reflect infectious or inflammatory etiology. No pneumothorax or pleural effusion. Cardiac silhouette within normal limits. No acute   osseous abnormality.     1816 Second dose of droperidol to be given, patient endorses some improvement in his symptoms before but not totally.  States that he has had known groundglass opacifications in his lungs previously from diffuse pneumonias.  Does not have any respiratory symptoms right now.  Declined any antibiotics currently.  Will give another dose of droperidol and reevaluate.   1913 Patient unable to be located.  Unsure if he left with his IV and or not.  Did not have it removed per nursing.   1916 Nursing to contact the patient to return for IV removal.       MEDICATIONS GIVEN IN THE EMERGENCY:  Medications   droPERidol (INAPSINE) injection 1.25 mg (has no administration in time range)   droPERidol (INAPSINE) injection 1.25 mg (1.25 mg Intravenous $Given 7/7/24 1555)   sodium chloride 0.9% BOLUS 1,000 mL (0 mLs Intravenous Stopped 7/7/24 1652)   iopamidol (ISOVUE-370) solution 90 mL (90 mLs Intravenous $Given 7/7/24 1630)   gabapentin (NEURONTIN) capsule 600 mg (600 mg Oral $Given 7/7/24 1652)       NEW PRESCRIPTIONS STARTED AT TODAY'S ER VISIT  Discharge Medication List as of 7/7/2024  7:32 PM            =================================================================    HPI    Patient information was obtained from: Patient and Wife    Use of Intrepreter: N/A       Matt Ramirez is a 38 year old male with pertinent medical history of peptic ulcer disease, medical maintenance therapy, who presents to the Emergency Department for evaluation of abdominal pain and nausea vomiting.  Patient states has been on methadone for 15 years, the last 2 days has not had it.  Has had some ongoing nausea vomiting secondary to history of peptic ulcer disease, typically takes Zofran as needed to help him with his nausea vomiting however has ran out.  He states his symptoms feel similar to his usual nausea vomiting and pain from his ulcers.  States that he has been able to keep his methadone down and missed his dose yesterday and today.  States he has not had a bowel movement in 2 weeks but has been passing gas, has gone close this amount of time in the past as well without a bowel movement.  States that his nausea vomiting symptoms are intermittent and comes and goes as a result of his history of pyloplasty.  Takes gabapentin for restless legs.  Otherwise on Pepcid for acid reflux.  Not on any other medications.  Denies any alcohol usage or any other substances.    PAST MEDICAL HISTORY:  Past Medical History:   Diagnosis Date    Duodenal stenosis     Gastroesophageal reflux disease     Generalized anxiety disorder with panic attacks     Methadone use     Microcytic anemia     Peptic ulcer disease        PAST SURGICAL HISTORY:  Past Surgical History:   Procedure Laterality Date    PARTIAL GASTRECTOMY      TENDON REPAIR      left pinky finger       CURRENT MEDICATIONS:    Prior to Admission Medications   Prescriptions Last Dose Informant Patient Reported? Taking?   famotidine (PEPCID) 20 MG tablet   No No   Sig: Take 1 tablet (20 mg) by mouth 2 times daily   gabapentin (NEURONTIN) 300 MG capsule   Yes No   Sig: Take 600 mg by  mouth 2 times daily   levETIRAcetam (KEPPRA) 500 MG tablet   Yes No   Sig: Take 500 mg by mouth daily   meclizine (ANTIVERT) 25 MG tablet   No No   Sig: Take 1 tablet (25 mg) by mouth 3 times daily as needed for dizziness   methadone (DOLOPHINE-INTENSOL) 10 MG/ML (HIGH CONC) solution   Yes No   Sig: Take 170 mg by mouth daily    omeprazole (PRILOSEC) 40 MG DR capsule   Yes No   Sig: Take 40 mg by mouth 2 times daily   ondansetron (ZOFRAN ODT) 4 MG ODT tab   No No   Sig: Take 1 tablet (4 mg) by mouth every 8 hours as needed for nausea   ondansetron (ZOFRAN ODT) 4 MG ODT tab   No No   Sig: Take 1 tablet (4 mg) by mouth every 8 hours as needed for vomiting or nausea   ondansetron (ZOFRAN-ODT) 4 MG ODT tab   No No   Sig: Take 1 tablet (4 mg) by mouth every 6 hours as needed for nausea   promethazine (PHENERGAN) 25 MG suppository   No No   Sig: Place 1 suppository (25 mg) rectally every 6 hours as needed for nausea   testosterone cypionate (DEPOTESTOSTERONE) 200 MG/ML injection   Yes No   Sig: Inject 200 mg into the muscle every 14 days      Facility-Administered Medications: None       ALLERGIES:  Allergies   Allergen Reactions    Aspirin GI Disturbance     ulcers       FAMILY HISTORY:  Family History   Problem Relation Age of Onset    Heart Disease Mother     Hypertension Mother     Hyperlipidemia Mother     Anesthesia Reaction Mother         behavioral changes, no personal reaction    Diabetes Father     Hypertension Father     Hyperlipidemia Father     LUNG DISEASE Father     No Known Problems Brother     Diabetes Maternal Grandmother     Cerebrovascular Disease Maternal Grandmother     Deep Vein Thrombosis Maternal Grandmother     No Known Problems Brother     No Known Problems Brother     Colon Cancer No family hx of     Prostate Cancer No family hx of        SOCIAL HISTORY:  Social History     Tobacco Use    Smoking status: Former     Current packs/day: 0.00     Types: Cigarettes     Quit date: 2019     Years  since quittin.5    Smokeless tobacco: Never   Substance Use Topics    Alcohol use: Not Currently    Drug use: Yes     Comment: Methadone        VITALS:  Patient Vitals for the past 24 hrs:   BP Temp Temp src Pulse Resp SpO2 Weight   24 1715 -- -- -- 80 -- 98 % --   24 1613 -- -- -- 77 -- 100 % --   24 1522 -- -- -- -- -- 96 % --   24 1449 138/88 98.3  F (36.8  C) Oral 98 20 -- 77.1 kg (170 lb)       PHYSICAL EXAM    General Appearance: Distress secondary to ongoing nausea vomiting and pain  Head:  Normocephalic, atraumatic  Eyes:  PERRL, conjunctiva/corneas clear, EOM's intact  ENT:  Lips, mucosa, and tongue normal; membranes are moist without pallor  Neck:  Supple, symmetrical, trachea midline, no adenopathy  Chest:  No tenderness or deformity, no crepitus  Cardio:  Regular rate and rhythm, no murmur/gallop/rub, 2+ pulses symmetric in all extremities  Pulm:  No respiratory distress, clear to auscultation bilaterally  Back:  No midline tenderness to palpation, no paraspinal tenderness, No CVA tenderness  Abdomen:  Soft, epigastric tenderness, negative Perez's, negative McBurney's.  No guarding or rebound.  Extremities:  Extremities normal, atraumatic, no cyanosis or edema, full ROM and motor tone intact, bilateral pulses intact upper and lower  Skin:  Skin warm, dry, no rashes  Neuro:  Alert and oriented ×3, moving all extremities, no gross sensory defects     RADIOLOGY/LABS:  Reviewed all pertinent imaging. Please see official radiology report. All pertinent labs reviewed and interpreted.    Results for orders placed or performed during the hospital encounter of 24   CT Abdomen Pelvis w Contrast    Impression    IMPRESSION:   1.  No acute findings or inflammatory changes in the abdomen or pelvis.    2.  Small to moderate amount of stool throughout the colon. No bowel obstruction.    3.  Scattered bibasilar groundglass opacities, likely infectious or inflammatory.   Chest XR,  PA &  LAT    Impression    IMPRESSION: Groundglass opacities visualized on CT are not well visualized on the chest radiograph. Findings likely reflect infectious or inflammatory etiology. No pneumothorax or pleural effusion. Cardiac silhouette within normal limits. No acute   osseous abnormality.   Comprehensive metabolic panel   Result Value Ref Range    Sodium 139 135 - 145 mmol/L    Potassium 4.1 3.4 - 5.3 mmol/L    Carbon Dioxide (CO2) 24 22 - 29 mmol/L    Anion Gap 12 7 - 15 mmol/L    Urea Nitrogen 9.8 6.0 - 20.0 mg/dL    Creatinine 0.75 0.67 - 1.17 mg/dL    GFR Estimate >90 >60 mL/min/1.73m2    Calcium 9.8 8.6 - 10.0 mg/dL    Chloride 103 98 - 107 mmol/L    Glucose 110 (H) 70 - 99 mg/dL    Alkaline Phosphatase 116 40 - 150 U/L    AST 19 0 - 45 U/L    ALT 16 0 - 70 U/L    Protein Total 7.6 6.4 - 8.3 g/dL    Albumin 4.7 3.5 - 5.2 g/dL    Bilirubin Total <0.2 <=1.2 mg/dL   Result Value Ref Range    Lipase 15 13 - 60 U/L   CBC with platelets and differential   Result Value Ref Range    WBC Count 10.9 4.0 - 11.0 10e3/uL    RBC Count 5.20 4.40 - 5.90 10e6/uL    Hemoglobin 13.8 13.3 - 17.7 g/dL    Hematocrit 41.5 40.0 - 53.0 %    MCV 80 78 - 100 fL    MCH 26.5 26.5 - 33.0 pg    MCHC 33.3 31.5 - 36.5 g/dL    RDW 17.1 (H) 10.0 - 15.0 %    Platelet Count 226 150 - 450 10e3/uL    % Neutrophils 84 %    % Lymphocytes 10 %    % Monocytes 5 %    % Eosinophils 1 %    % Basophils 0 %    % Immature Granulocytes 1 %    NRBCs per 100 WBC 0 <1 /100    Absolute Neutrophils 9.1 (H) 1.6 - 8.3 10e3/uL    Absolute Lymphocytes 1.1 0.8 - 5.3 10e3/uL    Absolute Monocytes 0.6 0.0 - 1.3 10e3/uL    Absolute Eosinophils 0.1 0.0 - 0.7 10e3/uL    Absolute Basophils 0.0 0.0 - 0.2 10e3/uL    Absolute Immature Granulocytes 0.1 <=0.4 10e3/uL    Absolute NRBCs 0.0 10e3/uL       EKG:  NA    PROCEDURES:  NA    Hipolito Garcias MD PGY3  Emergency Medicine  [facility] EMERGENCY ROOM  [address]  [place] [address]  [phone]  Dept: [phone]     Hipolito Garcias MD  Resident  07/07/24 7838

## 2024-07-07 NOTE — ED TRIAGE NOTES
Ran out of zofran, unable to keep methadone down. States will go to his clinic tomorrow, just needs to get through tonight.Occasional dry heaves in triage.

## 2024-07-07 NOTE — ED TRIAGE NOTES
"Pt ambulatory to triage stating he wants to be seen again to \"settle my stomach\", was discharged from this ED about 30 minutes ago. Pt states he has not had his methadone and has been having n/v for \"days\". Pt was taking zofran and omeprazole but ran out of zofran.    Pt was just seen here today seeking methadone as he thinks he may have thrown up his doses the last few days. Pt states he was offered suboxone but he refuses. Pt states he has been on methadone for \"years\" and the change to suboxone would make him worse. Pt now states he is comfortable returning to his clinic for methadone tomorrow, but for now wants medications \"to settle my stomach\".     Triage Assessment (Adult)       Row Name 07/07/24 1812          Triage Assessment    Airway WDL WDL        Respiratory WDL    Respiratory WDL WDL        Skin Circulation/Temperature WDL    Skin Circulation/Temperature WDL X  sores on his face        Cognitive/Neuro/Behavioral WDL    Cognitive/Neuro/Behavioral WDL WDL                     "

## 2025-01-28 ENCOUNTER — HOSPITAL ENCOUNTER (EMERGENCY)
Facility: HOSPITAL | Age: 40
Discharge: LEFT BEFORE DISCHARGE | End: 2025-01-28
Attending: EMERGENCY MEDICINE | Admitting: EMERGENCY MEDICINE

## 2025-01-28 VITALS
OXYGEN SATURATION: 99 % | TEMPERATURE: 98.2 F | WEIGHT: 162.9 LBS | HEART RATE: 91 BPM | RESPIRATION RATE: 16 BRPM | BODY MASS INDEX: 22.09 KG/M2 | DIASTOLIC BLOOD PRESSURE: 92 MMHG | SYSTOLIC BLOOD PRESSURE: 139 MMHG

## 2025-01-28 PROCEDURE — 99284 EMERGENCY DEPT VISIT MOD MDM: CPT

## 2025-01-28 PROCEDURE — 93005 ELECTROCARDIOGRAM TRACING: CPT | Performed by: EMERGENCY MEDICINE

## 2025-01-28 PROCEDURE — 93005 ELECTROCARDIOGRAM TRACING: CPT | Performed by: STUDENT IN AN ORGANIZED HEALTH CARE EDUCATION/TRAINING PROGRAM

## 2025-01-28 ASSESSMENT — COLUMBIA-SUICIDE SEVERITY RATING SCALE - C-SSRS
1. IN THE PAST MONTH, HAVE YOU WISHED YOU WERE DEAD OR WISHED YOU COULD GO TO SLEEP AND NOT WAKE UP?: NO
2. HAVE YOU ACTUALLY HAD ANY THOUGHTS OF KILLING YOURSELF IN THE PAST MONTH?: NO
6. HAVE YOU EVER DONE ANYTHING, STARTED TO DO ANYTHING, OR PREPARED TO DO ANYTHING TO END YOUR LIFE?: NO

## 2025-01-28 NOTE — ED TRIAGE NOTES
Left chest pain today, on and off. Feeling generalized weakness and dizziness x 1 week      Triage Assessment (Adult)       Row Name 01/28/25 1329          Triage Assessment    Airway WDL WDL        Respiratory WDL    Respiratory WDL WDL        Cardiac WDL    Cardiac WDL chest pain        Chest Pain Assessment    Chest Pain Location anterior chest, left     Character aching

## 2025-01-28 NOTE — ED PROVIDER NOTES
EMERGENCY DEPARTMENT ENCOUNTER     NAME: Matt Ramirez   AGE: 39 year old male   YOB: 1985   MRN: [id number]   EVALUATION DATE & TIME: No admission date for patient encounter.   PCP: Aaron Hawley     Chief Complaint   Patient presents with    Chest Pain    Dizziness    Generalized Weakness   :    FINAL IMPRESSION       No diagnosis found.   Chest pain      ED COURSE & MEDICAL DECISION MAKING      Pertinent Labs & Imaging studies reviewed. (See chart for details)   39 year old male  presents to the Emergency Department for evaluation of approximately 1 week of feeling generalized lightheadedness, some intermittent vertigo as well, now having some chest discomfort. Initial Vitals Reviewed. Initial exam notable for well-appearing male with normal vitals, no nystagmus, normal gait, no abnormalities.  Clinically we did discuss that we would do a workup to look for things like viral illnesses, thyroid dysfunction, anemia, electrolyte abnormalities, dehydration, but also look for ACS.  EKG was done and shows sinus rhythm without any ischemic changes.  I did also order a D-dimer as he is not PERC negative due to smoking.  there was a significant delay in the lobby due to capacity issues, and when staff went to get the patient to start IV and workup he had left AMA.           2:10 PM I met with the patient for the initial interview and physical examination. Discussed plan for treatment and workup in the ED.        .      minutes critical care time, see procedure note below for details if relevant    Medical Decision Making    History:  Supplemental history from: N/A  External Record(s) reviewed: Outpatient Record: 1/23/2025, [facility]    Work Up:  Chart documentation includes differential considered and any EKGs or imaging independently interpreted by provider, where specified.  In additional to work up documented, I considered the following work up: Documented in chart, if  "applicable.    External consultation:  Discussion of management with another provider: Documented in chart, if applicable    Complicating factors:  Care impacted by chronic illness: Smoking / Nicotine Use  Care affected by social determinants of health: N/A    Disposition considerations:  Pt left AMA before workup complete    Not Applicable             MEDICATIONS GIVEN IN THE EMERGENCY:   Medications - No data to display   NEW PRESCRIPTIONS STARTED AT TODAY'S ER VISIT   New Prescriptions    No medications on file     ================================================================   HISTORY OF PRESENT ILLNESS       Patient information was obtained from: Patient     Use of Intrepreter: N/A     Matt Ramirez is a 39 year old male with history of anemia, smoking, anxiety, depression, peptic ulcer disease who presents to this emergency department by walk-in for evaluation of chest pain, dizziness, and weakness.     Patient reports feeling generally weak and unwell for ~1 week now. Today, he had newly onset room-spinning dizziness, lightheadedness with \"tunnel vision\", and chest pain. Patient further notes difficulty with deep breathing; endorses smoking.     Patient denies fever, cough, rhinorrhea, pharyngitis, nausea, vomiting, diarrhea, or any other symptoms at this time.       Per chart review: Patient was seen 1/23/2025 (5 days ago) at [facility] for evaluation of nausea and vomiting. He was discharged with prescription for Zofran and plan to continue to push fluids.      ================================================================        PAST HISTORY     PAST MEDICAL HISTORY:   Past Medical History:   Diagnosis Date    Duodenal stenosis     Gastroesophageal reflux disease     Generalized anxiety disorder with panic attacks     Methadone use     Microcytic anemia     Peptic ulcer disease       PAST SURGICAL HISTORY:   Past Surgical History:   Procedure Laterality Date    PARTIAL " GASTRECTOMY      TENDON REPAIR      left pinky finger      CURRENT MEDICATIONS:   famotidine (PEPCID) 20 MG tablet  gabapentin (NEURONTIN) 300 MG capsule  levETIRAcetam (KEPPRA) 500 MG tablet  meclizine (ANTIVERT) 25 MG tablet  methadone (DOLOPHINE-INTENSOL) 10 MG/ML (HIGH CONC) solution  omeprazole (PRILOSEC) 40 MG DR capsule  ondansetron (ZOFRAN ODT) 4 MG ODT tab  ondansetron (ZOFRAN ODT) 4 MG ODT tab  ondansetron (ZOFRAN-ODT) 4 MG ODT tab  promethazine (PHENERGAN) 25 MG suppository  testosterone cypionate (DEPOTESTOSTERONE) 200 MG/ML injection      ALLERGIES:   Allergies   Allergen Reactions    Aspirin GI Disturbance     ulcers      FAMILY HISTORY:   Family History   Problem Relation Age of Onset    Heart Disease Mother     Hypertension Mother     Hyperlipidemia Mother     Anesthesia Reaction Mother         behavioral changes, no personal reaction    Diabetes Father     Hypertension Father     Hyperlipidemia Father     LUNG DISEASE Father     No Known Problems Brother     Diabetes Maternal Grandmother     Cerebrovascular Disease Maternal Grandmother     Deep Vein Thrombosis Maternal Grandmother     No Known Problems Brother     No Known Problems Brother     Colon Cancer No family hx of     Prostate Cancer No family hx of       SOCIAL HISTORY:   Social History     Socioeconomic History    Marital status:    Tobacco Use    Smoking status: Former     Current packs/day: 0.00     Types: Cigarettes     Quit date: 2019     Years since quittin.0    Smokeless tobacco: Never   Substance and Sexual Activity    Alcohol use: Not Currently    Drug use: Yes     Comment: Methadone        VITALS  Patient Vitals for the past 24 hrs:   BP Temp Temp src Pulse Resp SpO2 Weight   25 1326 (!) 139/92 98.2  F (36.8  C) Oral 91 16 99 % 73.9 kg (162 lb 14.4 oz)        ================================================================    PHYSICAL EXAM     VITAL SIGNS: BP (!) 139/92   Pulse 91   Temp 98.2  F (36.8  C) (Oral)    Resp 16   Wt 73.9 kg (162 lb 14.4 oz)   SpO2 99%   BMI 22.09 kg/m     Constitutional:  Awake, no acute distress   HENT:  Atraumatic, oropharynx without exudate or erythema, membranes moist  Lymph:  No adenopathy  Eyes: EOM intact, PERRL, no injection  Neck: Supple  Respiratory:  Clear to auscultation bilaterally, no wheezes or crackles   Cardiovascular:  Regular rate and rhythm, single S1 and S2   GI:  Soft, nontender, nondistended, no rebound or guarding   Musculoskeletal:  Moves all extremities, no lower extremity edema, no deformities    Skin:  Warm, dry  Neurologic:  Alert and oriented x3, no focal deficits noted       ================================================================  LAB       All pertinent labs reviewed and interpreted.   Labs Ordered and Resulted from Time of ED Arrival to Time of ED Departure - No data to display     ===============================================================  RADIOLOGY       Reviewed all pertinent imaging. Please see official radiology report.   No orders to display         ================================================================  EKG     EKG reviewed interpreted by me shows sinus rhythm with rate of 78, normal axis, QTc 453 with no acute ST or T wave changes    I have independently reviewed and interpreted the EKG(s) documented above.     ================================================================  PROCEDURES         I, Tyler Chinchilla, deborah serving as a scribe to document services personally performed by Dr. Teresa based on my observation and the provider's statements to me. I, Jane Teresa MD attest that Tyler Chinchilla is acting in a scribe capacity, has observed my performance of the services and has documented them in accordance with my direction.       Jane Teresa M.D.   Emergency Medicine   [facility] EMERGENCY DEPARTMENT  [facility] [facility] [id number] [phone]  Dept: [phone]        Jane Teresa MD  01/28/25 8117

## 2025-01-29 ENCOUNTER — HOSPITAL ENCOUNTER (EMERGENCY)
Facility: HOSPITAL | Age: 40
Discharge: HOME | End: 2025-01-29
Attending: EMERGENCY MEDICINE | Admitting: EMERGENCY MEDICINE

## 2025-01-29 VITALS
HEIGHT: 71 IN | WEIGHT: 152.56 LBS | SYSTOLIC BLOOD PRESSURE: 124 MMHG | TEMPERATURE: 98 F | OXYGEN SATURATION: 99 % | RESPIRATION RATE: 18 BRPM | DIASTOLIC BLOOD PRESSURE: 72 MMHG | BODY MASS INDEX: 21.36 KG/M2 | HEART RATE: 63 BPM

## 2025-01-29 DIAGNOSIS — R05.1 ACUTE COUGH: ICD-10-CM

## 2025-01-29 DIAGNOSIS — K59.00 CONSTIPATION, UNSPECIFIED CONSTIPATION TYPE: ICD-10-CM

## 2025-01-29 DIAGNOSIS — R07.9 ACUTE CHEST PAIN: ICD-10-CM

## 2025-01-29 DIAGNOSIS — R42 DIZZINESS: ICD-10-CM

## 2025-01-29 DIAGNOSIS — F17.200 TOBACCO USE DISORDER: ICD-10-CM

## 2025-01-29 DIAGNOSIS — R91.8 PULMONARY NODULES: ICD-10-CM

## 2025-01-29 LAB
ALBUMIN SERPL BCG-MCNC: 4.4 G/DL (ref 3.5–5.2)
ALP SERPL-CCNC: 108 U/L (ref 40–150)
ALT SERPL W P-5'-P-CCNC: 15 U/L (ref 0–70)
ANION GAP SERPL CALCULATED.3IONS-SCNC: 7 MMOL/L (ref 7–15)
AST SERPL W P-5'-P-CCNC: 18 U/L (ref 0–45)
BASOPHILS # BLD AUTO: 0.1 10E3/UL (ref 0–0.2)
BASOPHILS NFR BLD AUTO: 1 %
BILIRUB DIRECT SERPL-MCNC: <0.2 MG/DL (ref 0–0.3)
BILIRUB SERPL-MCNC: 0.2 MG/DL
BUN SERPL-MCNC: 13.9 MG/DL (ref 6–20)
CALCIUM SERPL-MCNC: 9.8 MG/DL (ref 8.8–10.4)
CHLORIDE SERPL-SCNC: 104 MMOL/L (ref 98–107)
CREAT SERPL-MCNC: 0.86 MG/DL (ref 0.67–1.17)
EGFRCR SERPLBLD CKD-EPI 2021: >90 ML/MIN/1.73M2
EOSINOPHIL # BLD AUTO: 0.2 10E3/UL (ref 0–0.7)
EOSINOPHIL NFR BLD AUTO: 4 %
ERYTHROCYTE [DISTWIDTH] IN BLOOD BY AUTOMATED COUNT: 12.6 % (ref 10–15)
GLUCOSE SERPL-MCNC: 81 MG/DL (ref 70–99)
HCO3 SERPL-SCNC: 28 MMOL/L (ref 22–29)
HCT VFR BLD AUTO: 42.1 % (ref 40–53)
HGB BLD-MCNC: 14.3 G/DL (ref 13.3–17.7)
HOLD SPECIMEN: NORMAL
HOLD SPECIMEN: NORMAL
IMM GRANULOCYTES # BLD: 0 10E3/UL
IMM GRANULOCYTES NFR BLD: 0 %
LIPASE SERPL-CCNC: 18 U/L (ref 13–60)
LYMPHOCYTES # BLD AUTO: 2.2 10E3/UL (ref 0.8–5.3)
LYMPHOCYTES NFR BLD AUTO: 38 %
MAGNESIUM SERPL-MCNC: 2.2 MG/DL (ref 1.7–2.3)
MCH RBC QN AUTO: 29.1 PG (ref 26.5–33)
MCHC RBC AUTO-ENTMCNC: 34 G/DL (ref 31.5–36.5)
MCV RBC AUTO: 86 FL (ref 78–100)
MONOCYTES # BLD AUTO: 0.4 10E3/UL (ref 0–1.3)
MONOCYTES NFR BLD AUTO: 7 %
NEUTROPHILS # BLD AUTO: 2.9 10E3/UL (ref 1.6–8.3)
NEUTROPHILS NFR BLD AUTO: 49 %
NRBC # BLD AUTO: 0 10E3/UL
NRBC BLD AUTO-RTO: 0 /100
PLATELET # BLD AUTO: 182 10E3/UL (ref 150–450)
POTASSIUM SERPL-SCNC: 4 MMOL/L (ref 3.4–5.3)
PROT SERPL-MCNC: 7.2 G/DL (ref 6.4–8.3)
RBC # BLD AUTO: 4.91 10E6/UL (ref 4.4–5.9)
SODIUM SERPL-SCNC: 139 MMOL/L (ref 135–145)
TROPONIN T SERPL HS-MCNC: <6 NG/L
TSH SERPL DL<=0.005 MIU/L-ACNC: 1.07 UIU/ML (ref 0.3–4.2)
WBC # BLD AUTO: 5.8 10E3/UL (ref 4–11)

## 2025-01-29 PROCEDURE — 83735 ASSAY OF MAGNESIUM: CPT | Performed by: EMERGENCY MEDICINE

## 2025-01-29 PROCEDURE — 85004 AUTOMATED DIFF WBC COUNT: CPT | Performed by: EMERGENCY MEDICINE

## 2025-01-29 PROCEDURE — 83690 ASSAY OF LIPASE: CPT | Performed by: EMERGENCY MEDICINE

## 2025-01-29 PROCEDURE — 96360 HYDRATION IV INFUSION INIT: CPT

## 2025-01-29 PROCEDURE — 80048 BASIC METABOLIC PNL TOTAL CA: CPT | Performed by: EMERGENCY MEDICINE

## 2025-01-29 PROCEDURE — 250N000011 HC RX IP 250 OP 636: Performed by: EMERGENCY MEDICINE

## 2025-01-29 PROCEDURE — 96361 HYDRATE IV INFUSION ADD-ON: CPT

## 2025-01-29 PROCEDURE — 84484 ASSAY OF TROPONIN QUANT: CPT | Performed by: EMERGENCY MEDICINE

## 2025-01-29 PROCEDURE — 99285 EMERGENCY DEPT VISIT HI MDM: CPT | Mod: 25

## 2025-01-29 PROCEDURE — 93005 ELECTROCARDIOGRAM TRACING: CPT | Performed by: EMERGENCY MEDICINE

## 2025-01-29 PROCEDURE — 36415 COLL VENOUS BLD VENIPUNCTURE: CPT | Performed by: EMERGENCY MEDICINE

## 2025-01-29 PROCEDURE — 258N000003 HC RX IP 258 OP 636: Performed by: EMERGENCY MEDICINE

## 2025-01-29 PROCEDURE — 80053 COMPREHEN METABOLIC PANEL: CPT | Performed by: EMERGENCY MEDICINE

## 2025-01-29 PROCEDURE — 85018 HEMOGLOBIN: CPT | Performed by: EMERGENCY MEDICINE

## 2025-01-29 PROCEDURE — 84155 ASSAY OF PROTEIN SERUM: CPT | Performed by: EMERGENCY MEDICINE

## 2025-01-29 PROCEDURE — 85048 AUTOMATED LEUKOCYTE COUNT: CPT | Performed by: EMERGENCY MEDICINE

## 2025-01-29 PROCEDURE — 84443 ASSAY THYROID STIM HORMONE: CPT | Performed by: EMERGENCY MEDICINE

## 2025-01-29 PROCEDURE — 93005 ELECTROCARDIOGRAM TRACING: CPT | Performed by: STUDENT IN AN ORGANIZED HEALTH CARE EDUCATION/TRAINING PROGRAM

## 2025-01-29 RX ORDER — IOPAMIDOL 755 MG/ML
90 INJECTION, SOLUTION INTRAVASCULAR ONCE
Status: COMPLETED | OUTPATIENT
Start: 2025-01-29 | End: 2025-01-29

## 2025-01-29 RX ORDER — AZITHROMYCIN 250 MG/1
TABLET, FILM COATED ORAL
Qty: 6 TABLET | Refills: 0 | Status: SHIPPED | OUTPATIENT
Start: 2025-01-29 | End: 2025-02-03

## 2025-01-29 RX ORDER — MAGNESIUM HYDROXIDE/ALUMINUM HYDROXICE/SIMETHICONE 120; 1200; 1200 MG/30ML; MG/30ML; MG/30ML
15 SUSPENSION ORAL ONCE
Status: DISCONTINUED | OUTPATIENT
Start: 2025-01-29 | End: 2025-01-29 | Stop reason: HOSPADM

## 2025-01-29 RX ADMIN — IOPAMIDOL 90 ML: 755 INJECTION, SOLUTION INTRAVENOUS at 09:54

## 2025-01-29 RX ADMIN — SODIUM CHLORIDE 1000 ML: 9 INJECTION, SOLUTION INTRAVENOUS at 09:06

## 2025-01-29 ASSESSMENT — ENCOUNTER SYMPTOMS
NAUSEA: 0
CONSTIPATION: 1
SHORTNESS OF BREATH: 0
WEAKNESS: 0
COUGH: 1
VOMITING: 0
NUMBNESS: 0
FEVER: 0
APPETITE CHANGE: 1
LIGHT-HEADEDNESS: 1
DYSURIA: 0
HEADACHES: 0
DIARRHEA: 0
ABDOMINAL PAIN: 0

## 2025-01-29 ASSESSMENT — COLUMBIA-SUICIDE SEVERITY RATING SCALE - C-SSRS
1. IN THE PAST MONTH, HAVE YOU WISHED YOU WERE DEAD OR WISHED YOU COULD GO TO SLEEP AND NOT WAKE UP?: NO
6. HAVE YOU EVER DONE ANYTHING, STARTED TO DO ANYTHING, OR PREPARED TO DO ANYTHING TO END YOUR LIFE?: NO
2. HAVE YOU ACTUALLY HAD ANY THOUGHTS OF KILLING YOURSELF IN THE PAST MONTH?: NO

## 2025-01-29 NOTE — Clinical Note
Matt Ramirez was seen and treated in our emergency department on 1/29/2025.  He may return to work on 01/31/2025.       If you have any questions or concerns, please don't hesitate to call.      Sumaya Saeed MD

## 2025-01-29 NOTE — ED TRIAGE NOTES
Pt with 1 week lightheadedness also today with chest pain , relived by coughing. Reports hx of lung scarring on prev. Imaging, Pt is smoker, has been taking daily meds as prescribed.     Triage Assessment (Adult)       Row Name 01/29/25 0812          Triage Assessment    Airway WDL WDL        Respiratory WDL    Respiratory WDL WDL        Skin Circulation/Temperature WDL    Skin Circulation/Temperature WDL WDL        Cardiac WDL    Cardiac WDL chest pain        Chest Pain Assessment    Chest Pain Intervention 12-lead ECG obtained        Peripheral/Neurovascular WDL    Peripheral Neurovascular WDL WDL        Cognitive/Neuro/Behavioral WDL    Cognitive/Neuro/Behavioral WDL X  lightheaded

## 2025-01-29 NOTE — DISCHARGE INSTRUCTIONS
Overall everything looks really good.  CT scan of the brain for dizziness looks good.      CT scan looking through your lungs and abdomen shows that you do have some pulmonary nodules, spots on your lungs.  Sometimes this can be signs of prior infection or scar, sometimes this can be concerning in someone who has a smoking history.  It is recommended that you have a repeat CT scan to look at these nodules usually in about 6-12 months.  Speak with primary care and they will help arrange this as an outpatient.    Other changes to your lungs which look like it could be a current infection.  Will treat with oral antibiotics.    Take the antibiotics as directed and until gone to see if this helps with your chest discomfort, possibly even your dizziness.    Be sure that you are drinking at least 8 glasses of water a day to stay hydrated and to help soften stools.    Constipation I recommend increasing your water intake, increasing your fiber intake, and trying things like magnesium citrate and even stool softeners to help get your bowels moving.    Make appointment to follow-up with family doctor for next week for reevaluation for your chest pain and cough, constipation, and your overall dizziness.    Return to emergency department if you develop worsening chest pain, worsening difficulty breathing, worsening abdominal pain, or any other concerns    Thank you for choosing [facility] Emergency Department.  It has been my pleasure caring for you today.     ~Dr. Darlin MD

## 2025-01-29 NOTE — ED PROVIDER NOTES
EMERGENCY DEPARTMENT ENCOUNTER      NAME: Matt Ramirez  AGE: 39 year old male  YOB: 1985  MRN: [id number]  EVALUATION DATE & TIME: No admission date for patient encounter.    PCP: Aaron Hawley    ED PROVIDER: Sumaya Saeed M.D.        Chief Complaint   Patient presents with    Dizziness         FINAL IMPRESSION:    1. Acute chest pain    2. Dizziness    3. Acute cough    4. Constipation, unspecified constipation type    5. Pulmonary nodules    6. Tobacco use disorder            MEDICAL DECISION MAKING:    Matt Ramirez is a 39 year old male with history of stomach ulcers, tobacco abuse who presents to the ER with complaints of lightheadedness and chest pain.    He states that he has had lightheadedness, described as a feeling of being underwater, for at least a week.  Worsening in the past couple of days.  Also complains of chest discomfort.  He also reports that he has had decreased appetite for a while now, but states that he does reports himself to get at least 3 meals a day.  He states he has not had a bowel movement in at least 1-2 weeks.    Imaging does show findings for constipation and patient encouraged to make some diet changes and consider over-the-counter medication such as mag citrate and stool softeners.    Chest imaging does show pulmonary nodules as well as some possible infectious versus inflammatory changes.  Will do a course of antibiotics, covering atypicals as well.  CT head unremarkable.  At this time I feel he is appropriate for outpatient follow-up with primary care with regards to pulmonary nodules, his possible infectious versus inflammatory changes on CT chest, constipation, and overall dizziness.  Patient aware of this plan and agrees and all of his questions have been answered.      ED COURSE:  8:25 AM  I met with the patient to gather history and perform my exam. ED course and treatment discussed. This patient was taken to a private exam room  setting while in the waiting room during ED overcrowding. Patient gave verbal permission to be seen.     11:02 AM  The patient is aware of all of their results and agrees with the plan for discharge.  They understand what to watch for, when to return to the ER, and all of their questions have been answered.  He feels reassured with results.  He is already aware of pulmonary nodules and understands the importance to follow-up with primary care for ongoing monitoring of these.  He states he has had imaging in the past and primary care can compare today's results to those to see if these are changing in any way.  I do not think he requires admission to the hospital.  Patient feels reassured with doing antibiotics and follow-up as an outpatient.    I do not think that this represents rib fractures, myocarditis, pericarditis, endocarditis, ACS, PE, ruptured AAA, pneumothorax, aortic dissection, bowel obstruction, bowel ischemia, cholecystitis, kidney stone, pyelonephritis, CVA, TIA, SAH, glaucoma, temporal arteritis, sinus thrombosis, vascular dissection, pseudotumor cerebri, meningitis, enchephalitis, hypertensive urgency or emergency, or other such etiologies.      At the conclusion of the encounter I discussed the results of all of the tests and the disposition. Their questions were answered. The patient (and any family present) acknowledged understanding and were agreeable with the care plan.      CONSULTANTS:  none    MEDICATIONS GIVEN IN THE EMERGENCY:  Medications   sodium chloride 0.9% BOLUS 1,000 mL (0 mLs Intravenous Stopped 1/29/25 1140)   iopamidol (ISOVUE-370) solution 90 mL (90 mLs Intravenous $Given 1/29/25 0920)           NEW PRESCRIPTIONS STARTED AT TODAY'S ER VISIT     Medication List        Started      amoxicillin-clavulanate 875-125 MG tablet  Commonly known as: AUGMENTIN  1 tablet, Oral, 2 TIMES DAILY     azithromycin 250 MG tablet  Commonly known as: ZITHROMAX  Take 2 tablets (500 mg) by mouth  "daily for 1 day, THEN 1 tablet (250 mg) daily for 4 days.  Start taking on: January 29, 2025                  CONDITION:  stable        DISPOSITION:  D.c home         =================================================================  =================================================================  TRIAGE ASSESSMENT:  Pt with 1 week lightheadedness also today with chest pain , relived by coughing. Reports hx of lung scarring on prev. Imaging, Pt is smoker, has been taking daily meds as prescribed.     Triage Assessment (Adult)       Row Name 01/29/25 0812          Triage Assessment    Airway WDL WDL        Respiratory WDL    Respiratory WDL WDL        Skin Circulation/Temperature WDL    Skin Circulation/Temperature WDL WDL        Cardiac WDL    Cardiac WDL chest pain        Chest Pain Assessment    Chest Pain Intervention 12-lead ECG obtained        Peripheral/Neurovascular WDL    Peripheral Neurovascular WDL WDL        Cognitive/Neuro/Behavioral WDL    Cognitive/Neuro/Behavioral WDL X  lightheaded                          ED Triage Vitals [01/29/25 0811]   Encounter Vitals Group      /86      Systolic BP Percentile       Diastolic BP Percentile       Pulse 80      Resp 18      Temp 98  F (36.7  C)      Temp src Oral      SpO2 97 %      Weight 69.2 kg (152 lb 8.9 oz)      Height 1.803 m (5' 11\")          ================================================================  ================================================================    HPI    Patient information was obtained from: patient    Use of Intrepreter: N/A      Matt Ramirez is a 39 year old male with history of stomach ulcers, tobacco abuse who presents to the ER with complaints of lightheadedness and chest pain.    He states that he has had lightheadedness, described as a feeling of being underwater, for at least a week.  Worsening in the past couple of days.  Also complains of chest discomfort.  He also reports that he has had decreased " appetite for a while now, but states that he does reports himself to get at least 3 meals a day.  He states he has not had a bowel movement in at least 1-2 weeks.    Otherwise denies headache, fevers, abdominal pain, diarrhea, vomiting.    CHART REVIEW:  Chart review shows that patient was here yesterday for same complaints but left prior to workup.      REVIEW OF SYSTEMS  Review of Systems   Constitutional:  Positive for appetite change. Negative for fever.   Respiratory:  Positive for cough. Negative for shortness of breath.    Cardiovascular:  Positive for chest pain.   Gastrointestinal:  Positive for constipation. Negative for abdominal pain, diarrhea, nausea and vomiting.   Genitourinary:  Negative for dysuria.   Neurological:  Positive for light-headedness. Negative for weakness, numbness and headaches.   All other systems reviewed and are negative.      PAST MEDICAL HISTORY:  Past Medical History:   Diagnosis Date    Duodenal stenosis     Gastroesophageal reflux disease     Generalized anxiety disorder with panic attacks     Methadone use     Microcytic anemia     Peptic ulcer disease          PAST SURGICAL HISTORY:  Past Surgical History:   Procedure Laterality Date    PARTIAL GASTRECTOMY      TENDON REPAIR      left pinky finger         CURRENT MEDICATIONS:    Prior to Admission medications    Medication Sig Start Date End Date Taking? Authorizing Provider   famotidine (PEPCID) 20 MG tablet Take 1 tablet (20 mg) by mouth 2 times daily 10/16/22   Angie Donald PA-C   gabapentin (NEURONTIN) 300 MG capsule Take 600 mg by mouth 2 times daily    Unknown, Entered By History   levETIRAcetam (KEPPRA) 500 MG tablet Take 500 mg by mouth daily    Unknown, Entered By History   meclizine (ANTIVERT) 25 MG tablet Take 1 tablet (25 mg) by mouth 3 times daily as needed for dizziness 5/9/22   Gertrudis Marques MD   methadone (DOLOPHINE-INTENSOL) 10 MG/ML (HIGH CONC) solution Take 170 mg by mouth daily     Reported, Patient    omeprazole (PRILOSEC) 40 MG DR capsule Take 40 mg by mouth 2 times daily    Unknown, Entered By History   ondansetron (ZOFRAN ODT) 4 MG ODT tab Take 1 tablet (4 mg) by mouth every 8 hours as needed for vomiting or nausea 24   Joe Hernandez MD   ondansetron (ZOFRAN ODT) 4 MG ODT tab Take 1 tablet (4 mg) by mouth every 8 hours as needed for nausea 10/16/22   Angie Donald PA-C   ondansetron (ZOFRAN-ODT) 4 MG ODT tab Take 1 tablet (4 mg) by mouth every 6 hours as needed for nausea 22   Johnny Powell MD   promethazine (PHENERGAN) 25 MG suppository Place 1 suppository (25 mg) rectally every 6 hours as needed for nausea 24   Joe Hernandez MD   testosterone cypionate (DEPOTESTOSTERONE) 200 MG/ML injection Inject 200 mg into the muscle every 14 days    Unknown, Entered By History         ALLERGIES:  Allergies   Allergen Reactions    Aspirin GI Disturbance     ulcers    Ibuprofen GI Disturbance         FAMILY HISTORY:  Family History   Problem Relation Age of Onset    Heart Disease Mother     Hypertension Mother     Hyperlipidemia Mother     Anesthesia Reaction Mother         behavioral changes, no personal reaction    Diabetes Father     Hypertension Father     Hyperlipidemia Father     LUNG DISEASE Father     No Known Problems Brother     Diabetes Maternal Grandmother     Cerebrovascular Disease Maternal Grandmother     Deep Vein Thrombosis Maternal Grandmother     No Known Problems Brother     No Known Problems Brother     Colon Cancer No family hx of     Prostate Cancer No family hx of          SOCIAL HISTORY:  Social History     Socioeconomic History    Marital status:    Tobacco Use    Smoking status: Former     Current packs/day: 0.00     Types: Cigarettes     Quit date:      Years since quittin.0    Smokeless tobacco: Never   Substance and Sexual Activity    Alcohol use: Not Currently    Drug use: Yes     Comment: Methadone         VITALS:  Patient Vitals for the  "past 24 hrs:   BP Temp Temp src Pulse Resp SpO2 Height Weight   01/29/25 1144 124/72 -- -- 63 18 99 % -- --   01/29/25 0811 126/86 98  F (36.7  C) Oral 80 18 97 % 1.803 m (5' 11\") 69.2 kg (152 lb 8.9 oz)       Wt Readings from Last 3 Encounters:   01/28/25 73.9 kg (162 lb 14.4 oz)   07/07/24 77.1 kg (170 lb)   07/07/24 77.2 kg (170 lb 4.8 oz)       Estimated Creatinine Clearance: 111.8 mL/min (based on SCr of 0.86 mg/dL).    PHYSICAL EXAM    Constitutional:  Well developed, Well nourished, NAD  HENT:  Normocephalic, Atraumatic, Bilateral external ears normal, Nose normal. Neck- Supple, No stridor.   Eyes:  PERRL, EOMI, Conjunctiva normal, No discharge.  Respiratory:  Normal breath sounds, No respiratory distress, No wheezing, Speaks full sentences easily. No cough.   Cardiovascular:  Normal heart rate, Regular rhythm, No murmurs, No rubs, No gallops.   GI:  No excessive obesity.  Bowel sounds normal, Soft, No tenderness, No masses, No flank tenderness. No rebound or guarding.   : deferred  Musculoskeletal: No cyanosis, No clubbing. Good range of motion in all major joints. No major deformities noted.   Integument:  Warm, Dry, No erythema, No rash.  No petechiae.   Neurologic:  Alert & oriented x 3, Normal motor function, Normal sensory function, No focal deficits noted. Normal gait.   Psychiatric:  Affect normal, Cooperative         LAB:  All pertinent labs reviewed and interpreted.  Recent Results (from the past 24 hours)   Basic metabolic panel    Collection Time: 01/29/25  8:57 AM   Result Value Ref Range    Sodium 139 135 - 145 mmol/L    Potassium 4.0 3.4 - 5.3 mmol/L    Chloride 104 98 - 107 mmol/L    Carbon Dioxide (CO2) 28 22 - 29 mmol/L    Anion Gap 7 7 - 15 mmol/L    Urea Nitrogen 13.9 6.0 - 20.0 mg/dL    Creatinine 0.86 0.67 - 1.17 mg/dL    GFR Estimate >90 >60 mL/min/1.73m2    Calcium 9.8 8.8 - 10.4 mg/dL    Glucose 81 70 - 99 mg/dL   Troponin T, High Sensitivity    Collection Time: 01/29/25  8:57 AM " "  Result Value Ref Range    Troponin T, High Sensitivity <6 <=22 ng/L   Magnesium    Collection Time: 01/29/25  8:57 AM   Result Value Ref Range    Magnesium 2.2 1.7 - 2.3 mg/dL   TSH with free T4 reflex    Collection Time: 01/29/25  8:57 AM   Result Value Ref Range    TSH 1.07 0.30 - 4.20 uIU/mL   Hepatic function panel    Collection Time: 01/29/25  8:57 AM   Result Value Ref Range    Protein Total 7.2 6.4 - 8.3 g/dL    Albumin 4.4 3.5 - 5.2 g/dL    Bilirubin Total 0.2 <=1.2 mg/dL    Alkaline Phosphatase 108 40 - 150 U/L    AST 18 0 - 45 U/L    ALT 15 0 - 70 U/L    Bilirubin Direct <0.20 0.00 - 0.30 mg/dL   Lipase    Collection Time: 01/29/25  8:57 AM   Result Value Ref Range    Lipase 18 13 - 60 U/L   CBC with platelets and differential    Collection Time: 01/29/25  8:57 AM   Result Value Ref Range    WBC Count 5.8 4.0 - 11.0 10e3/uL    RBC Count 4.91 4.40 - 5.90 10e6/uL    Hemoglobin 14.3 13.3 - 17.7 g/dL    Hematocrit 42.1 40.0 - 53.0 %    MCV 86 78 - 100 fL    MCH 29.1 26.5 - 33.0 pg    MCHC 34.0 31.5 - 36.5 g/dL    RDW 12.6 10.0 - 15.0 %    Platelet Count 182 150 - 450 10e3/uL    % Neutrophils 49 %    % Lymphocytes 38 %    % Monocytes 7 %    % Eosinophils 4 %    % Basophils 1 %    % Immature Granulocytes 0 %    NRBCs per 100 WBC 0 <1 /100    Absolute Neutrophils 2.9 1.6 - 8.3 10e3/uL    Absolute Lymphocytes 2.2 0.8 - 5.3 10e3/uL    Absolute Monocytes 0.4 0.0 - 1.3 10e3/uL    Absolute Eosinophils 0.2 0.0 - 0.7 10e3/uL    Absolute Basophils 0.1 0.0 - 0.2 10e3/uL    Absolute Immature Granulocytes 0.0 <=0.4 10e3/uL    Absolute NRBCs 0.0 10e3/uL   Extra Red Top Tube    Collection Time: 01/29/25  8:57 AM   Result Value Ref Range    Hold Specimen JIC    Extra Green Top (Lithium Heparin) Tube    Collection Time: 01/29/25  8:57 AM   Result Value Ref Range    Hold Specimen JIC        No results found for: \"ABORH\"        RADIOLOGY:  Reviewed all pertinent imaging. Please see official radiology report.    CT Abdomen " Pelvis w Contrast   Final Result   IMPRESSION:    1.  No pulmonary embolus.   2.  Numerous subtle scattered upper lobe predominant groundglass centrilobular nodular opacities suggestive of respiratory bronchiolitis if an interstitial lung disease (RB-ILD). Infectious or inflammatory bronchiolitis is also in the differential the    appropriate context.   3.  Mild colonic stool burden.   4.  Similar extra greater than intrahepatic biliary ductal dilation which is not significantly changed since at least 2020.   5.  Scattered pulmonary nodules measuring up to 3 mm. Follow-up guidelines as below.      REFERENCE:   Guidelines for Management of Incidental Pulmonary Nodules Detected on CT Images: From the Fleischner Society 2017.    Guidelines apply to incidental nodules in patients who are 35 years or older.   Guidelines do not apply to lung cancer screening, patients with immunosuppression, or patients with known primary cancer.      MULTIPLE NODULES   Nodule size <6 mm   Low-risk patients: No follow-up needed.   High-risk patients: Optional follow-up at 12 months.         CT Chest Pulmonary Embolism w Contrast   Final Result   IMPRESSION:    1.  No pulmonary embolus.   2.  Numerous subtle scattered upper lobe predominant groundglass centrilobular nodular opacities suggestive of respiratory bronchiolitis if an interstitial lung disease (RB-ILD). Infectious or inflammatory bronchiolitis is also in the differential the    appropriate context.   3.  Mild colonic stool burden.   4.  Similar extra greater than intrahepatic biliary ductal dilation which is not significantly changed since at least 2020.   5.  Scattered pulmonary nodules measuring up to 3 mm. Follow-up guidelines as below.      REFERENCE:   Guidelines for Management of Incidental Pulmonary Nodules Detected on CT Images: From the Fleischner Society 2017.    Guidelines apply to incidental nodules in patients who are 35 years or older.   Guidelines do not apply to  lung cancer screening, patients with immunosuppression, or patients with known primary cancer.      MULTIPLE NODULES   Nodule size <6 mm   Low-risk patients: No follow-up needed.   High-risk patients: Optional follow-up at 12 months.         Head CT w/o contrast   Final Result   IMPRESSION:   1.  No acute intracranial process.            EKG:    Indication: Chest pain     Performed at: 7:57am  Impression: Sinus rhythm at 70 bpm.  Flipped T waves noted lead aVR, aVL and V1.  GA interval 148 ms,  ms, QTc 438 ms.  Overall unremarkable EKG.  No signs for delta waves or signs for Brugada.  Nonspecific ST changes compared to January 28, 2025 (yesterday)      I have independently reviewed and interpreted the EKG(s) documented above.        PROCEDURES:  none    Medical Decision Making  Obtained supplemental history:Supplemental history obtained?: No  Reviewed external records: External records reviewed?: Documented in chart and Outpatient Record: see HPI  Care impacted by chronic illness:Documented in Chart and Other: Tobacco abuse  Did you consider but not order tests?: Work up considered but not performed and documented in chart, if applicable  Did you interpret images independently?: Independent interpretation of ECG and images noted in documentation, when applicable.  Consultation discussion with other provider:Did you involve another provider (consultant, , pharmacy, etc.)?: No  Discharge. I prescribed additional prescription strength medication(s) as charted. I considered admission, but ultimately discharged patient as laboratories and imaging reassuring.    MIPS: CT Pulmonary Angiogram:Patient is moderate to high risk for PE.      Sumaya Saeed M.D. [facility]  Emergency Medicine and Medical Toxicology  [facility] EMERGENCY DEPARTMENT  [facility] [facility] [id number] [phone]  Dept: [phone]           Sumaya Saeed,  MD  01/29/25 4215

## 2025-02-01 LAB
ATRIAL RATE - MUSE: 70 BPM
ATRIAL RATE - MUSE: 78 BPM
DIASTOLIC BLOOD PRESSURE - MUSE: NORMAL MMHG
DIASTOLIC BLOOD PRESSURE - MUSE: NORMAL MMHG
INTERPRETATION ECG - MUSE: NORMAL
INTERPRETATION ECG - MUSE: NORMAL
P AXIS - MUSE: 77 DEGREES
P AXIS - MUSE: 80 DEGREES
PR INTERVAL - MUSE: 132 MS
PR INTERVAL - MUSE: 148 MS
QRS DURATION - MUSE: 102 MS
QRS DURATION - MUSE: 92 MS
QT - MUSE: 398 MS
QT - MUSE: 406 MS
QTC - MUSE: 438 MS
QTC - MUSE: 453 MS
R AXIS - MUSE: 61 DEGREES
R AXIS - MUSE: 62 DEGREES
SYSTOLIC BLOOD PRESSURE - MUSE: NORMAL MMHG
SYSTOLIC BLOOD PRESSURE - MUSE: NORMAL MMHG
T AXIS - MUSE: 68 DEGREES
T AXIS - MUSE: 72 DEGREES
VENTRICULAR RATE- MUSE: 70 BPM
VENTRICULAR RATE- MUSE: 78 BPM